# Patient Record
Sex: FEMALE | Race: WHITE | Employment: FULL TIME | URBAN - METROPOLITAN AREA
[De-identification: names, ages, dates, MRNs, and addresses within clinical notes are randomized per-mention and may not be internally consistent; named-entity substitution may affect disease eponyms.]

---

## 2017-02-03 NOTE — PERIOP NOTES
Patient has just been added to the surgery schedule for Monday, 2/6/2017 and does not have a PAT appointment. Per orders received from Dr. Myron Daniel office, labs are being done at [facility] and H&P is being done by Morris BUCIO. Left a VM for Priscila Beau at Dr. Myron Daniel office informing her that the patient still needs to come through PAT and that we have some openings this afternoon. Requested she return my call regarding this. DOS: 2/6/2017.

## 2017-02-03 NOTE — H&P
ORTHOPAEDIC SPINE SURGERY HISTORY & PHYSICAL    NAME:     Zonia Lamar   :       1985   MRN:       [id number]   DATE:      2/3/2017    HPI:   Zonia Lamar is a 33year old female who presented to our office on 16 with the complaint of debilitating right leg pain with associated weakness for the past 2 weeks. She is a very active person who does CrossFit. She noticed some pain the day after a workout. Her pain continually progressed. She is now unable to stand or sit. She has debilitating pain. She gets some relief with lying down. Hydrocodone is not holding her pain. She denies any LE numbness/tingling, urinary retention, bowel/bladder dysfunction or saddle paresthesias        PAST MEDICAL HISTORY: Patient denies    PSH: Lasik eye surgery    Family History: Cancer (mother)    Social History: Non-smoker, Drinks 2 alcoholic beverages per week     Allergies: NKDA     PHYSICAL EXAMINATION:     She is very pleasant. She is in distress. She has weakness for plantarflexion of the ankle that is 4-/5. Straight leg raise testing is positive on the right. She has diminished sensation to light touch for S1 on the right side. Motor, sensory, and reflex exams are otherwise nonfocal.  There is no clubbing, cyanosis, or edema. IMAGING STUDIES:   I reviewed Lumbar spine MRI which shows a large disc herniation on the right side at L5-S1 with inferior migration. There is severe lateral recess stenosis on the right side at L5-S1. She has disc degeneration at L3-4 and L4-5. ASSESSMENT:  Large lumbar disc herniation. PLAN:  Dr. Jana Liao discussed the pro's and con's of all the treatment options with the patient and her . They have elected to proceed with operative intervention. They understand the risks, benefits, and alternatives. The patient has provided informed consent. We will proceed with surgery after clearance.      [place]  Orthopaedic Surgery  Physician Assistant to Dr. Yady Grimes

## 2017-02-05 ENCOUNTER — ANESTHESIA EVENT (OUTPATIENT)
Dept: SURGERY | Age: 32
End: 2017-02-05
Payer: COMMERCIAL

## 2017-02-06 ENCOUNTER — SURGERY (OUTPATIENT)
Age: 32
End: 2017-02-06

## 2017-02-06 ENCOUNTER — ANESTHESIA (OUTPATIENT)
Dept: SURGERY | Age: 32
End: 2017-02-06
Payer: COMMERCIAL

## 2017-02-06 ENCOUNTER — APPOINTMENT (OUTPATIENT)
Dept: GENERAL RADIOLOGY | Age: 32
End: 2017-02-06
Attending: ORTHOPAEDIC SURGERY
Payer: COMMERCIAL

## 2017-02-06 ENCOUNTER — HOSPITAL ENCOUNTER (OUTPATIENT)
Age: 32
Setting detail: OUTPATIENT SURGERY
Discharge: HOME | End: 2017-02-06
Attending: ORTHOPAEDIC SURGERY | Admitting: ORTHOPAEDIC SURGERY
Payer: COMMERCIAL

## 2017-02-06 VITALS
SYSTOLIC BLOOD PRESSURE: 121 MMHG | BODY MASS INDEX: 19.34 KG/M2 | DIASTOLIC BLOOD PRESSURE: 85 MMHG | OXYGEN SATURATION: 99 % | TEMPERATURE: 98.1 F | WEIGHT: 109.13 LBS | HEIGHT: 63 IN | HEART RATE: 83 BPM | RESPIRATION RATE: 17 BRPM

## 2017-02-06 LAB
ABO + RH BLD: NORMAL
BLOOD GROUP ANTIBODIES SERPL: NORMAL
HCG UR QL: NEGATIVE
SPECIMEN EXP DATE BLD: NORMAL

## 2017-02-06 PROCEDURE — 81025 URINE PREGNANCY TEST: CPT

## 2017-02-06 PROCEDURE — 77030003161 HC GRFT DURA MTRX INLC -E: Performed by: ORTHOPAEDIC SURGERY

## 2017-02-06 PROCEDURE — 77030031139 HC SUT VCRL2 J&J -A: Performed by: ORTHOPAEDIC SURGERY

## 2017-02-06 PROCEDURE — 74011250636 HC RX REV CODE- 250/636: Performed by: ORTHOPAEDIC SURGERY

## 2017-02-06 PROCEDURE — 76210000016 HC OR PH I REC 1 TO 1.5 HR: Performed by: ORTHOPAEDIC SURGERY

## 2017-02-06 PROCEDURE — 77030032490 HC SLV COMPR SCD KNE COVD -B: Performed by: ORTHOPAEDIC SURGERY

## 2017-02-06 PROCEDURE — 77030026438 HC STYL ET INTUB CARD -A: Performed by: NURSE ANESTHETIST, CERTIFIED REGISTERED

## 2017-02-06 PROCEDURE — 74011250636 HC RX REV CODE- 250/636: Performed by: ANESTHESIOLOGY

## 2017-02-06 PROCEDURE — 77030003666 HC NDL SPINAL BD -A: Performed by: ORTHOPAEDIC SURGERY

## 2017-02-06 PROCEDURE — 77030018723 HC ELCTRD BLD COVD -A: Performed by: ORTHOPAEDIC SURGERY

## 2017-02-06 PROCEDURE — 86900 BLOOD TYPING SEROLOGIC ABO: CPT | Performed by: ORTHOPAEDIC SURGERY

## 2017-02-06 PROCEDURE — 76210000021 HC REC RM PH II 0.5 TO 1 HR: Performed by: ORTHOPAEDIC SURGERY

## 2017-02-06 PROCEDURE — 74011250636 HC RX REV CODE- 250/636

## 2017-02-06 PROCEDURE — 76000 FLUOROSCOPY <1 HR PHYS/QHP: CPT

## 2017-02-06 PROCEDURE — 74011000250 HC RX REV CODE- 250: Performed by: ORTHOPAEDIC SURGERY

## 2017-02-06 PROCEDURE — 77030008684 HC TU ET CUF COVD -B: Performed by: NURSE ANESTHETIST, CERTIFIED REGISTERED

## 2017-02-06 PROCEDURE — 77030019908 HC STETH ESOPH SIMS -A: Performed by: NURSE ANESTHETIST, CERTIFIED REGISTERED

## 2017-02-06 PROCEDURE — 74011000250 HC RX REV CODE- 250

## 2017-02-06 PROCEDURE — 76060000034 HC ANESTHESIA 1.5 TO 2 HR: Performed by: ORTHOPAEDIC SURGERY

## 2017-02-06 PROCEDURE — 36415 COLL VENOUS BLD VENIPUNCTURE: CPT | Performed by: ORTHOPAEDIC SURGERY

## 2017-02-06 PROCEDURE — 76010000162 HC OR TIME 1.5 TO 2 HR INTENSV-TIER 1: Performed by: ORTHOPAEDIC SURGERY

## 2017-02-06 PROCEDURE — 74011250637 HC RX REV CODE- 250/637: Performed by: ORTHOPAEDIC SURGERY

## 2017-02-06 PROCEDURE — 77030002933 HC SUT MCRYL J&J -A: Performed by: ORTHOPAEDIC SURGERY

## 2017-02-06 PROCEDURE — 77030037134 HC WRAP COMPR BACK THER SOLM -B

## 2017-02-06 PROCEDURE — 77030004391 HC BUR FLUT MEDT -C: Performed by: ORTHOPAEDIC SURGERY

## 2017-02-06 PROCEDURE — 77030020782 HC GWN BAIR PAWS FLX 3M -B

## 2017-02-06 PROCEDURE — 74011000272 HC RX REV CODE- 272: Performed by: ORTHOPAEDIC SURGERY

## 2017-02-06 DEVICE — DURAGEN® SUTURABLE DURAL REGENERATION MATRIX, 2 IN X 2 IN (5 CM X 5 CM)
Type: IMPLANTABLE DEVICE | Site: SPINE LUMBAR | Status: FUNCTIONAL
Brand: DURAGEN® SUTURABLE

## 2017-02-06 RX ORDER — SODIUM CHLORIDE 0.9 % (FLUSH) 0.9 %
5-10 SYRINGE (ML) INJECTION EVERY 8 HOURS
Status: DISCONTINUED | OUTPATIENT
Start: 2017-02-06 | End: 2017-02-06 | Stop reason: HOSPADM

## 2017-02-06 RX ORDER — SODIUM CHLORIDE 0.9 % (FLUSH) 0.9 %
5-10 SYRINGE (ML) INJECTION AS NEEDED
Status: DISCONTINUED | OUTPATIENT
Start: 2017-02-06 | End: 2017-02-06 | Stop reason: HOSPADM

## 2017-02-06 RX ORDER — OXYCODONE AND ACETAMINOPHEN 5; 325 MG/1; MG/1
2 TABLET ORAL
Status: COMPLETED | OUTPATIENT
Start: 2017-02-06 | End: 2017-02-06

## 2017-02-06 RX ORDER — OXYCODONE AND ACETAMINOPHEN 5; 325 MG/1; MG/1
1 TABLET ORAL
COMMUNITY
End: 2017-03-10

## 2017-02-06 RX ORDER — HYDROMORPHONE HYDROCHLORIDE 1 MG/ML
.25-.5 INJECTION, SOLUTION INTRAMUSCULAR; INTRAVENOUS; SUBCUTANEOUS
Status: DISCONTINUED | OUTPATIENT
Start: 2017-02-06 | End: 2017-02-06 | Stop reason: HOSPADM

## 2017-02-06 RX ORDER — PROPOFOL 10 MG/ML
INJECTION, EMULSION INTRAVENOUS AS NEEDED
Status: DISCONTINUED | OUTPATIENT
Start: 2017-02-06 | End: 2017-02-06 | Stop reason: HOSPADM

## 2017-02-06 RX ORDER — BUPIVACAINE HYDROCHLORIDE AND EPINEPHRINE 5; 5 MG/ML; UG/ML
INJECTION, SOLUTION EPIDURAL; INTRACAUDAL; PERINEURAL AS NEEDED
Status: DISCONTINUED | OUTPATIENT
Start: 2017-02-06 | End: 2017-02-06 | Stop reason: HOSPADM

## 2017-02-06 RX ORDER — CEFAZOLIN SODIUM IN 0.9 % NACL 2 G/50 ML
2 INTRAVENOUS SOLUTION, PIGGYBACK (ML) INTRAVENOUS ONCE
Status: COMPLETED | OUTPATIENT
Start: 2017-02-06 | End: 2017-02-06

## 2017-02-06 RX ORDER — MIDAZOLAM HYDROCHLORIDE 1 MG/ML
INJECTION, SOLUTION INTRAMUSCULAR; INTRAVENOUS AS NEEDED
Status: DISCONTINUED | OUTPATIENT
Start: 2017-02-06 | End: 2017-02-06 | Stop reason: HOSPADM

## 2017-02-06 RX ORDER — TRIAMCINOLONE ACETONIDE 40 MG/ML
INJECTION, SUSPENSION INTRA-ARTICULAR; INTRAMUSCULAR AS NEEDED
Status: DISCONTINUED | OUTPATIENT
Start: 2017-02-06 | End: 2017-02-06 | Stop reason: HOSPADM

## 2017-02-06 RX ORDER — DIAZEPAM 5 MG/1
5 TABLET ORAL
COMMUNITY
End: 2017-03-05

## 2017-02-06 RX ORDER — SODIUM CHLORIDE, SODIUM LACTATE, POTASSIUM CHLORIDE, CALCIUM CHLORIDE 600; 310; 30; 20 MG/100ML; MG/100ML; MG/100ML; MG/100ML
25 INJECTION, SOLUTION INTRAVENOUS CONTINUOUS
Status: DISCONTINUED | OUTPATIENT
Start: 2017-02-06 | End: 2017-02-06 | Stop reason: HOSPADM

## 2017-02-06 RX ORDER — BISMUTH SUBSALICYLATE 262 MG
1 TABLET,CHEWABLE ORAL DAILY
COMMUNITY

## 2017-02-06 RX ORDER — POVIDONE-IODINE 10 %
SOLUTION, NON-ORAL TOPICAL AS NEEDED
Status: DISCONTINUED | OUTPATIENT
Start: 2017-02-06 | End: 2017-02-06 | Stop reason: HOSPADM

## 2017-02-06 RX ORDER — ROCURONIUM BROMIDE 10 MG/ML
INJECTION, SOLUTION INTRAVENOUS AS NEEDED
Status: DISCONTINUED | OUTPATIENT
Start: 2017-02-06 | End: 2017-02-06 | Stop reason: HOSPADM

## 2017-02-06 RX ORDER — GLYCOPYRROLATE 0.2 MG/ML
INJECTION INTRAMUSCULAR; INTRAVENOUS AS NEEDED
Status: DISCONTINUED | OUTPATIENT
Start: 2017-02-06 | End: 2017-02-06 | Stop reason: HOSPADM

## 2017-02-06 RX ORDER — FENTANYL CITRATE 50 UG/ML
INJECTION, SOLUTION INTRAMUSCULAR; INTRAVENOUS AS NEEDED
Status: DISCONTINUED | OUTPATIENT
Start: 2017-02-06 | End: 2017-02-06 | Stop reason: HOSPADM

## 2017-02-06 RX ORDER — LIDOCAINE HYDROCHLORIDE 10 MG/ML
0.1 INJECTION, SOLUTION EPIDURAL; INFILTRATION; INTRACAUDAL; PERINEURAL AS NEEDED
Status: DISCONTINUED | OUTPATIENT
Start: 2017-02-06 | End: 2017-02-06 | Stop reason: HOSPADM

## 2017-02-06 RX ORDER — VANCOMYCIN HYDROCHLORIDE 1 G/20ML
INJECTION, POWDER, LYOPHILIZED, FOR SOLUTION INTRAVENOUS AS NEEDED
Status: DISCONTINUED | OUTPATIENT
Start: 2017-02-06 | End: 2017-02-06 | Stop reason: HOSPADM

## 2017-02-06 RX ORDER — DEXAMETHASONE SODIUM PHOSPHATE 4 MG/ML
INJECTION, SOLUTION INTRA-ARTICULAR; INTRALESIONAL; INTRAMUSCULAR; INTRAVENOUS; SOFT TISSUE AS NEEDED
Status: DISCONTINUED | OUTPATIENT
Start: 2017-02-06 | End: 2017-02-06 | Stop reason: HOSPADM

## 2017-02-06 RX ORDER — DIAZEPAM 10 MG/1
5 TABLET ORAL
Status: ON HOLD | COMMUNITY
End: 2017-02-06 | Stop reason: CLARIF

## 2017-02-06 RX ORDER — ONDANSETRON 2 MG/ML
INJECTION INTRAMUSCULAR; INTRAVENOUS AS NEEDED
Status: DISCONTINUED | OUTPATIENT
Start: 2017-02-06 | End: 2017-02-06 | Stop reason: HOSPADM

## 2017-02-06 RX ORDER — SODIUM CHLORIDE, SODIUM LACTATE, POTASSIUM CHLORIDE, CALCIUM CHLORIDE 600; 310; 30; 20 MG/100ML; MG/100ML; MG/100ML; MG/100ML
1000 INJECTION, SOLUTION INTRAVENOUS CONTINUOUS
Status: DISCONTINUED | OUTPATIENT
Start: 2017-02-06 | End: 2017-02-06 | Stop reason: HOSPADM

## 2017-02-06 RX ORDER — NEOSTIGMINE METHYLSULFATE 1 MG/ML
INJECTION INTRAVENOUS AS NEEDED
Status: DISCONTINUED | OUTPATIENT
Start: 2017-02-06 | End: 2017-02-06 | Stop reason: HOSPADM

## 2017-02-06 RX ORDER — LIDOCAINE HYDROCHLORIDE 20 MG/ML
INJECTION, SOLUTION EPIDURAL; INFILTRATION; INTRACAUDAL; PERINEURAL AS NEEDED
Status: DISCONTINUED | OUTPATIENT
Start: 2017-02-06 | End: 2017-02-06 | Stop reason: HOSPADM

## 2017-02-06 RX ADMIN — HYDROMORPHONE HYDROCHLORIDE 0.5 MG: 1 INJECTION, SOLUTION INTRAMUSCULAR; INTRAVENOUS; SUBCUTANEOUS at 09:55

## 2017-02-06 RX ADMIN — DEXAMETHASONE SODIUM PHOSPHATE 4 MG: 4 INJECTION, SOLUTION INTRA-ARTICULAR; INTRALESIONAL; INTRAMUSCULAR; INTRAVENOUS; SOFT TISSUE at 07:51

## 2017-02-06 RX ADMIN — HYDROMORPHONE HYDROCHLORIDE 0.5 MG: 1 INJECTION, SOLUTION INTRAMUSCULAR; INTRAVENOUS; SUBCUTANEOUS at 09:42

## 2017-02-06 RX ADMIN — Medication 18 ML: at 08:22

## 2017-02-06 RX ADMIN — HYDROMORPHONE HYDROCHLORIDE 0.5 MG: 1 INJECTION, SOLUTION INTRAMUSCULAR; INTRAVENOUS; SUBCUTANEOUS at 09:29

## 2017-02-06 RX ADMIN — ONDANSETRON 4 MG: 2 INJECTION INTRAMUSCULAR; INTRAVENOUS at 08:43

## 2017-02-06 RX ADMIN — MIDAZOLAM HYDROCHLORIDE 2 MG: 1 INJECTION, SOLUTION INTRAMUSCULAR; INTRAVENOUS at 07:34

## 2017-02-06 RX ADMIN — VANCOMYCIN HYDROCHLORIDE 1 G: 1 INJECTION, POWDER, LYOPHILIZED, FOR SOLUTION INTRAVENOUS at 08:40

## 2017-02-06 RX ADMIN — LIDOCAINE HYDROCHLORIDE 60 MG: 20 INJECTION, SOLUTION EPIDURAL; INFILTRATION; INTRACAUDAL; PERINEURAL at 07:38

## 2017-02-06 RX ADMIN — TRIAMCINOLONE ACETONIDE 40 MG: 40 INJECTION, SUSPENSION INTRA-ARTICULAR; INTRAMUSCULAR at 08:35

## 2017-02-06 RX ADMIN — BUPIVACAINE HYDROCHLORIDE AND EPINEPHRINE 20 ML: 5; 5 INJECTION, SOLUTION EPIDURAL; INTRACAUDAL; PERINEURAL at 08:30

## 2017-02-06 RX ADMIN — MIDAZOLAM HYDROCHLORIDE 1 MG: 1 INJECTION, SOLUTION INTRAMUSCULAR; INTRAVENOUS at 07:36

## 2017-02-06 RX ADMIN — SODIUM CHLORIDE, SODIUM LACTATE, POTASSIUM CHLORIDE, AND CALCIUM CHLORIDE 1000 ML: 600; 310; 30; 20 INJECTION, SOLUTION INTRAVENOUS at 07:04

## 2017-02-06 RX ADMIN — FENTANYL CITRATE 50 MCG: 50 INJECTION, SOLUTION INTRAMUSCULAR; INTRAVENOUS at 08:06

## 2017-02-06 RX ADMIN — THROMBIN, TOPICAL (BOVINE) 20000 UNITS: KIT at 08:21

## 2017-02-06 RX ADMIN — FENTANYL CITRATE 50 MCG: 50 INJECTION, SOLUTION INTRAMUSCULAR; INTRAVENOUS at 07:38

## 2017-02-06 RX ADMIN — ROCURONIUM BROMIDE 30 MG: 10 INJECTION, SOLUTION INTRAVENOUS at 07:38

## 2017-02-06 RX ADMIN — GLYCOPYRROLATE 0.2 MG: 0.2 INJECTION INTRAMUSCULAR; INTRAVENOUS at 08:45

## 2017-02-06 RX ADMIN — Medication 2 EACH: at 08:20

## 2017-02-06 RX ADMIN — OXYCODONE HYDROCHLORIDE AND ACETAMINOPHEN 2 TABLET: 5; 325 TABLET ORAL at 10:56

## 2017-02-06 RX ADMIN — SODIUM CHLORIDE, SODIUM LACTATE, POTASSIUM CHLORIDE, AND CALCIUM CHLORIDE: 600; 310; 30; 20 INJECTION, SOLUTION INTRAVENOUS at 08:16

## 2017-02-06 RX ADMIN — FENTANYL CITRATE 50 MCG: 50 INJECTION, SOLUTION INTRAMUSCULAR; INTRAVENOUS at 07:34

## 2017-02-06 RX ADMIN — PROPOFOL 150 MG: 10 INJECTION, EMULSION INTRAVENOUS at 07:38

## 2017-02-06 RX ADMIN — SODIUM CHLORIDE 1000 ML: 900 IRRIGANT IRRIGATION at 08:21

## 2017-02-06 RX ADMIN — CEFAZOLIN 2 G: 1 INJECTION, POWDER, FOR SOLUTION INTRAMUSCULAR; INTRAVENOUS; PARENTERAL at 07:49

## 2017-02-06 RX ADMIN — FENTANYL CITRATE 50 MCG: 50 INJECTION, SOLUTION INTRAMUSCULAR; INTRAVENOUS at 08:22

## 2017-02-06 RX ADMIN — NEOSTIGMINE METHYLSULFATE 1 MG: 1 INJECTION INTRAVENOUS at 08:45

## 2017-02-06 NOTE — OP NOTES
[address]  [address] [facility], [address] [facility]    OPERATIVE REPORT      NAME: Jenna Mcneill    AGE: 33 y.o. YOB: 1985    MEDICAL RECORD NUMBER: [id number]    DATE OF SURGERY: 2/6/2017    PREOPERATIVE DIAGNOSIS: Lumbar disk herniation    POSTOPERATIVE DIAGNOSIS: Lumbar disk herniation    OPERATIVE PROCEDURE: Right-sided L5-S1 hemilaminotomy, partial facetectomy, and excision of herniated disk tissue, with the use of the operating room microscope. SURGEON: Sandra Nava MD     ASSISTANT: RUPINDER Colmenares    ANESTHESIA: General    COMPLICATIONS: None     ESTIMATED BLOOD LOSS: 15    INDICATION FOR PROCEDURE: The patient is a very pleasant 33 y.o. female with debilitating right leg pain who failed conservative measures. She elected to proceed with operative intervention. She was aware of the risks, benefits, and alternatives. She provided informed consent. PROCEDURE: The patient was identified in the preoperative holding area. The lumbar spine was marked by me. She was transferred to the operating room where general anesthesia was given. She was also given perioperative antibiotics. She was placed prone on the Arthur frame. All bony prominences were well padded. The lumbar spine was prepped and draped in the usual standard fashion. We performed a surgical timeout. I made a skin incision over L5-S1. I exposed L5-S1 in standard fashion with electrocautery. I placed retractors and obtained intraoperative fluoroscopy to verify our level. I brought in the microscope. I then performed a right-sided hemilaminotomy with the high-speed bur of L5 and S1. I excised the ligamentum flavum to expose the neurologic elements. I performed a partial medial facetectomy on the right side. I gently retracted the nerve root medially to expose our disk herniation, which was excised in standard fashion.  The nerve root and thecal sac were decompressed and under no undue tension. I copiously irrigated the entire wound. We had good hemostasis. There was no CSF leaking under microscopic inspection. The soft tissues were injected with 0.5% Marcaine. We closed the wound in several layers. A sterile dressing was applied. The patient was extubated and transferred to the recovery room in good medical condition. I, Dr. Greg Martinez, performed the above procedures.      Greg Martinez MD  2/6/2017

## 2017-02-06 NOTE — IP AVS SNAPSHOT
[address] 
[phone] Patient: Jennifer Cain MRN: [id number] :1985 You are allergic to the following No active allergies Recent Documentation Height Weight BMI OB Status Smoking Status 1.6 m 49.5 kg 19.33 kg/m2 Having regular periods Never Smoker Unresulted Labs Order Current Status CULTURE, MRSA In process Emergency Contacts Name Discharge Info Relation Home Work Mobile Orlando Grubbs DISCHARGE CAREGIVER [3] Spouse [3] [phone] About your hospitalization You were admitted on:  2017 You last received care in the:  [facility] PACU You were discharged on:  2017 Unit phone number:  [phone] Why you were hospitalized Your primary diagnosis was:  Not on File Providers Seen During Your Hospitalizations Provider Role Specialty Primary office phone Lester Currie MD Attending Provider Orthopedic Surgery [phone] Your Primary Care Physician (PCP) Primary Care Physician Office Phone Office Fax NOT ON FILE ** None ** ** None ** Follow-up Information Follow up With Details Comments Contact Info Not On File Bshsi   Not On File (62) Patient has a PCP but that physician is not listed in [address] [facility]. Current Discharge Medication List  
  
CONTINUE these medications which have CHANGED Dose & Instructions Dispensing Information Comments Morning Noon Evening Bedtime VALIUM 5 mg tablet Generic drug:  diazePAM  
What changed:  Another medication with the same name was removed. Continue taking this medication, and follow the directions you see here. Your next dose is: Today, Tomorrow Other:  _________ Dose:  5 mg Take 5 mg by mouth every six (6) hours as needed for Anxiety. Refills:  0 CONTINUE these medications which have NOT CHANGED Dose & Instructions Dispensing Information Comments Morning Noon Evening Bedtime JUNEL FE 1/20 (28) PO Your next dose is: Today, Tomorrow Other:  _________ Take  by mouth. Refills:  0  
     
   
   
   
  
 multivitamin tablet Commonly known as:  ONE A DAY Your next dose is: Today, Tomorrow Other:  _________ Dose:  1 Tab Take 1 Tab by mouth daily. Refills:  0  
     
   
   
   
  
 oxyCODONE-acetaminophen 5-325 mg per tablet Commonly known as:  PERCOCET Your next dose is: Today, Tomorrow Other:  _________ Dose:  1 Tab Take 1 Tab by mouth every four (4) hours as needed for Pain. Refills:  0 Discharge Instructions Lumbar Surgery Discharge Instructions Activities 1. You are going home a well person, be as active as possible. Your only exercise should be walking. Start with short frequent walks and increase your walking distance each day. Limit the amount of time you sit to 20-30 minute intervals. Sitting for prolonged periods of time will be uncomfortable for you following your surgery. 2. Do not lift anything over five pounds. 3. Do not do any straining, twisting or bending. 4. When you are in the bed, you may lay on your back or on either side. Do not lay on your stomach. Diet ? You may resume your normal diet. If your throat is sore, you may want to eat soft foods for a few days. Be sure to drink plenty of fluids, it is important to keep yourself hydrated. ? Avoid alcoholic beverages and tobacco products. Medications 1. Take your pain medication as directed. 2. It is important to have regular bowel movements. Pain medications may cause constipation. Stool softeners, prune juice, and increasing your water and fiber intake may help in preventing constipation. 3. Laxatives should only be used if the above preventative measures have failed and you still have not had a bowel movement after three days. Driving You may not drive or return to work until instructed by your physician. However, you may ride in the car for short periods of time. Brace ? If you have a back brace, you should wear your brace at all times when you are up walking, sitting prolong periods and sleeping. Do not wear the brace while showering. ? Remember to always wear a cotton t-shirt underneath your brace. ? Do not bend or twist when your brace is off. Showering 1. You may shower the day after your surgery if your incision is not draining. 2. Leave the dressing on during your shower allowing the water to run over it. Once you get out of the shower, pat dressing dry. Oren Pickerel 3. Do not take a tub bath. Caring for your incision Leave the mesh on until it falls off on its own. Follow Up Once you are home, call your physicians office to schedule an appointment for your three-week postoperative visit. Notify your physician if you develop any of the following conditions: 1. Fever above 101 degrees for 24 hours. 2. Nausea or vomiting. 3. Severe headache. 4. Inability to urinate. 5. Loss of bowel or bladder function. 6. Changes in sensation in your extremities (numbness, tingling, loss of color). 7. Severe pain or pain not relieved by medications. 8. Redness, swelling, or drainage from your incision. 9. Persistent pain in the chest or calf of either leg. 10. Increased weakness (if this is greater than before your surgery). Webster [phone] (ext [phone] or [phone]) DISCHARGE SUMMARY from your Nurse The following personal items collected during your admission are returned to you:  
Dental Appliance: Dental Appliances: None Vision: Visual Aid: None Hearing Aid:   
Jewelry: Jewelry: Body Piercing (with ) Clothing: Clothing: Other (comment) (clothes to locker) Other Valuables: Other Valuables: None Valuables sent to safe:   
 
PATIENT INSTRUCTIONS: 
 
After general anesthesia or intravenous sedation, for 24 hours or while taking prescription Narcotics: · Limit your activities · Do not drive and operate hazardous machinery · Do not make important personal or business decisions · Do  not drink alcoholic beverages · If you have not urinated within 8 hours after discharge, please contact your surgeon on call. Report the following to your surgeon: 
· Excessive pain, swelling, redness or odor of or around the surgical area · Temperature over 100.5 · Nausea and vomiting lasting longer than 4 hours or if unable to take medications · Any signs of decreased circulation or nerve impairment to extremity: change in color, persistent  numbness, tingling, coldness or increase pain · Any questions [address] Breathing deep and coughing are very important exercises to do after surgery. Deep breathing and coughing open the little air tubes and air sacks in your lungs. You take deep breaths every day. You may not even notice - it is just something you do when you sigh or yawn. It is a natural exercise you do to keep these air passages open. After surgery, take deep breaths and cough, on purpose. Coughing and deep breathing help prevent bronchitis and pneumonia after surgery. If you had chest or belly surgery, use a pillow as a \"hug buddy\" and hold it tightly to your chest or belly when you cough. DIRECTIONS: 
6. Take 10 to 15 slow deep breaths every hour while awake. 7. Breathe in deeply, and hold it for 2 seconds. 8. Exhale slowly through puckered lips, like blowing up a balloon. 9. After every 4th or 5th deep breath, hug your pillow to your chest or belly and give a hard, deep cough. Yes, it will probably hurt.   But doing this exercise is very important part of healing after surgery. Take your pain medicine to help you do this exercise without too much pain. IF YOU HAVE BEEN DIAGNOSED WITH SLEEP APNEA, PLEASE USE YOUR SLEEIFP APNEA DEVICE OR CPAP MACHINE WHEN YOU INTEND TO NAP AFTER TAKING PAIN MEDICATION. Ankle Pumps Ankle pumps increase the circulation of oxygenated blood to your lower extremities and decrease your risk for circulation problems such as blood clots. They also stretch the muscles, tendons and ligaments in your foot and ankle, and prevent joint contracture in the ankle and foot, especially after surgeries on the legs. It is important to do ankle pump exercises regularly after surgery because immobility increases your risk for developing a blood clot. Your doctor may also have you take an Aspirin for the next few days as well. If your doctor did not ask you to take an Aspirin, consult with him before starting Aspirin therapy on your own. Slowly point your foot forward, feeling the muscles on the top of your lower leg stretch, and hold this position for 5 seconds. Next, pull your foot back toward you as far as possible, stretching the calf muscles, and hold that position for 5 seconds. Repeat with the other foot. Perform 10 repetitions every hour while awake for both ankles if possible (down and then up with the foot once is one repetition). You should feel gentle stretching of the muscles in your lower leg when doing this exercise. If you feel pain, or your range of motion is limited, don't  Push too hard. Only go the limit your joint and muscles will let you go. If you have increasing pain, progressively worsening leg warmth or swelling, STOP the exercise and call your doctor. Below is information about the medications your doctor is prescribing after your visit: 
 
Other information in your discharge envelope: PRESCRIPTIONS      PHYSICAL THERAPY PRESCRIPTION 
 APPOINTMENT CARDS [facility] Pamphlet for block or block with On-Q Catheter from Anesthesia Service Medical device information sheets/pamphlets from their  School/work excuse note. /parent work excuse note. These are general instructions for a healthy lifestyle: *  Please give a list of your current medications to your Primary Care Provider. *  Please update this list whenever your medications are discontinued, doses are 
    changed, or new medications (including over-the-counter products) are added. *  Please carry medication information at all times in case of emergency situations. About Smoking No smoking / No tobacco products / Avoid exposure to second hand smoke Surgeon General's Warning:  Quitting smoking now greatly reduces serious risk to your health. Obesity, smoking, and sedentary lifestyle greatly increases your risk for illness and disease. A healthy diet, regular physical exercise & weight monitoring are important for maintaining a healthy lifestyle. Congestive Heart Failure You may be retaining fluid if you have a history of heart failure or if you experience any of the following symptoms:  Weight gain of 3 pounds or more overnight or 5 pounds in a week, increased swelling in our hands or feet or shortness of breath while lying flat in bed. Please call your doctor as soon as you notice any of these symptoms; do not wait until your next office visit. Recognize signs and symptoms of STROKE: 
F - face looks uneven A - arms unable to move or move even S - speech slurred or non-existent T - time-call 911 as soon as signs and symptoms begin-DO NOT go Back to bed or wait to see if you get better-TIME IS BRAIN. Warning signs of HEART ATTACK Call 911 if you have these symptoms · Chest discomfort.   Most heart attacks involve discomfort in the center of the chest that lasts more than a few minutes, or that goes away and comes back. It can feel like uncomfortable pressure, squeezing, fullness, or pain. · Discomfort in other areas of the upper body. Symptoms can include pain or discomfort in one or both Arms, the back, neck, jaw, or stomach. ·  Shortness of breath with or without chest discomfort. · Other signs may include breaking out in a cold sweat, nausea, or lightheadedness Don't wait more than five minutes to call 911 - MINUTES MATTER! Fast action can save your life. Calling 911 is almost always the fastest way to get lifesaving treatment. Emergency Medical Services staff can begin treatment when they arrive - up to an hour sooner than if someone gets to the hospital by car. [facility] MEDICATION AND SIDE EFFECT GUIDE The [address] [facility] [place] EFFECT GUIDE was provided to the PATIENT AND CARE PROVIDER. Information provided includes instruction about drug purpose and common side effects for the following medications: · Discharge Orders None Introducing \[facility]\"" & [facility] SERVICES! Smiley Velásquez introduces [employer] patient portal. Now you can access parts of your medical record, email your doctor's office, and request medication refills online. 1. In your internet browser, go to [email or web address] 2. Click on the First Time User? Click Here link in the Sign In box. You will see the New Member Sign Up page. 3. Enter your [employer] Access Code exactly as it appears below. You will not need to use this code after youve completed the sign-up process. If you do not sign up before the expiration date, you must request a new code. · [employer] Access Code: [email or web address] Expires: 5/4/2017 11:12 AM 
 
4. Enter the last four digits of your Social Security Number (xxxx) and Date of Birth (mm/dd/yyyy) as indicated and click Submit. You will be taken to the next sign-up page. 5. Create a [employer] ID. This will be your [employer] login ID and cannot be changed, so think of one that is secure and easy to remember. 6. Create a [employer] password. You can change your password at any time. 7. Enter your Password Reset Question and Answer. This can be used at a later time if you forget your password. 8. Enter your e-mail address. You will receive e-mail notification when new information is available in [address] 9. Click Sign Up. You can now view and download portions of your medical record. 10. Click the Download Summary menu link to download a portable copy of your medical information. If you have questions, please visit the Frequently Asked Questions section of the [employer] website. Remember, [employer] is NOT to be used for urgent needs. For medical emergencies, dial 911. Now available from your iPhone and Android! General Information Please provide this summary of care documentation to your next provider. Patient Signature:  ____________________________________________________________ Date:  ____________________________________________________________  
  
[facility] Provider Signature:  ____________________________________________________________ Date:  ____________________________________________________________

## 2017-02-06 NOTE — IP AVS SNAPSHOT
Current Discharge Medication List  
  
Take these medications at their scheduled times Dose & Instructions Dispensing Information Comments Morning Noon Evening Bedtime  
 multivitamin tablet Commonly known as:  ONE A DAY Your next dose is: Today, Tomorrow Other:  ____________ Dose:  1 Tab Take 1 Tab by mouth daily. Refills:  0 Take these medications as needed Dose & Instructions Dispensing Information Comments Morning Noon Evening Bedtime  
 oxyCODONE-acetaminophen 5-325 mg per tablet Commonly known as:  PERCOCET Your next dose is: Today, Tomorrow Other:  ____________ Dose:  1 Tab Take 1 Tab by mouth every four (4) hours as needed for Pain. Refills:  0 VALIUM 5 mg tablet Generic drug:  diazePAM  
   
Your next dose is: Today, Tomorrow Other:  ____________ Dose:  5 mg Take 5 mg by mouth every six (6) hours as needed for Anxiety. Refills:  0 Take these medications as directed Dose & Instructions Dispensing Information Comments Morning Noon Evening Bedtime JUNEL FE 1/20 (28) PO Your next dose is: Today, Tomorrow Other:  ____________ Take  by mouth. Refills:  0

## 2017-02-06 NOTE — IP AVS SNAPSHOT
Summary of Care Report The Summary of Care report has been created to help improve care coordination. Users with access to [employer] or [address] (Web-based application) may access additional patient information including the Discharge Summary. If you are not currently a [address] user and need more information, please call the number listed below in the [place] [address] section and ask to be connected with Medical Records. Facility Information Name Address Phone [address] [facility] [place] [address] [phone] Patient Information Patient Name Sex DANNI Wilson ([id number]) Female 1985 Discharge Information Admitting Provider Service Area Unit Greg Martinez MD / [phone] [facility] / [phone] Discharge Provider Discharge Date/Time Discharge Disposition Destination (none) 2017 (Pending) AHR (none) Patient Language Language ENGLISH [13] You are allergic to the following No active allergies Current Discharge Medication List  
  
CONTINUE these medications which have CHANGED Dose & Instructions Dispensing Information Comments VALIUM 5 mg tablet Generic drug:  diazePAM  
What changed:  Another medication with the same name was removed. Continue taking this medication, and follow the directions you see here. Dose:  5 mg Take 5 mg by mouth every six (6) hours as needed for Anxiety. Refills:  0 CONTINUE these medications which have NOT CHANGED Dose & Instructions Dispensing Information Comments JUNEL FE 1/20 (28) PO Take  by mouth. Refills:  0  
   
 multivitamin tablet Commonly known as:  ONE A DAY Dose:  1 Tab Take 1 Tab by mouth daily. Refills:  0  
   
 oxyCODONE-acetaminophen 5-325 mg per tablet Commonly known as:  PERCOCET  
 Dose:  1 Tab Take 1 Tab by mouth every four (4) hours as needed for Pain. Refills:  0 Surgery Information ID Date/Time Status Primary Surgeon All Procedures Location [id number] 2/6/2017 0730 Unposted Lee Ann Gonsalez MD RIGHT L5-S1 MICRODISCECTOMY [facility] MAIN OR Follow-up Information Follow up With Details Comments Contact Info Not On File [facility]   Not On File (62) Patient has a PCP but that physician is not listed in [facility]. Discharge Instructions Lumbar Surgery Discharge Instructions Activities 1. You are going home a well person, be as active as possible. Your only exercise should be walking. Start with short frequent walks and increase your walking distance each day. Limit the amount of time you sit to 20-30 minute intervals. Sitting for prolonged periods of time will be uncomfortable for you following your surgery. 2. Do not lift anything over five pounds. 3. Do not do any straining, twisting or bending. 4. When you are in the bed, you may lay on your back or on either side. Do not lay on your stomach. Diet ? You may resume your normal diet. If your throat is sore, you may want to eat soft foods for a few days. Be sure to drink plenty of fluids, it is important to keep yourself hydrated. ? Avoid alcoholic beverages and tobacco products. Medications 1. Take your pain medication as directed. 2. It is important to have regular bowel movements. Pain medications may cause constipation. Stool softeners, prune juice, and increasing your water and fiber intake may help in preventing constipation. 3. Laxatives should only be used if the above preventative measures have failed and you still have not had a bowel movement after three days. Driving You may not drive or return to work until instructed by your physician. However, you may ride in the car for short periods of time. Brace ? If you have a back brace, you should wear your brace at all times when you are up walking, sitting prolong periods and sleeping. Do not wear the brace while showering. ? Remember to always wear a cotton t-shirt underneath your brace. ? Do not bend or twist when your brace is off. Showering 1. You may shower the day after your surgery if your incision is not draining. 2. Leave the dressing on during your shower allowing the water to run over it. Once you get out of the shower, pat dressing dry. Laxmi Gearing 3. Do not take a tub bath. Caring for your incision Leave the mesh on until it falls off on its own. Follow Up Once you are home, call your physicians office to schedule an appointment for your three-week postoperative visit. Notify your physician if you develop any of the following conditions: 1. Fever above 101 degrees for 24 hours. 2. Nausea or vomiting. 3. Severe headache. 4. Inability to urinate. 5. Loss of bowel or bladder function. 6. Changes in sensation in your extremities (numbness, tingling, loss of color). 7. Severe pain or pain not relieved by medications. 8. Redness, swelling, or drainage from your incision. 9. Persistent pain in the chest or calf of either leg. 10. Increased weakness (if this is greater than before your surgery). [place] Fukdhcyqlssz-[phone] (ext 0030 or 1886) DISCHARGE SUMMARY from your Nurse The following personal items collected during your admission are returned to you:  
Dental Appliance: Dental Appliances: None Vision: Visual Aid: None Hearing Aid:   
Jewelry: Jewelry: Body Piercing (with ) Clothing: Clothing: Other (comment) (clothes to locker) Other Valuables: Other Valuables: None Valuables sent to safe:   
 
PATIENT INSTRUCTIONS: 
 
After general anesthesia or intravenous sedation, for 24 hours or while taking prescription Narcotics: · Limit your activities · Do not drive and operate hazardous machinery · Do not make important personal or business decisions · Do  not drink alcoholic beverages · If you have not urinated within 8 hours after discharge, please contact your surgeon on call. Report the following to your surgeon: 
· Excessive pain, swelling, redness or odor of or around the surgical area · Temperature over 100.5 · Nausea and vomiting lasting longer than 4 hours or if unable to take medications · Any signs of decreased circulation or nerve impairment to extremity: change in color, persistent  numbness, tingling, coldness or increase pain · Any questions [address] Breathing deep and coughing are very important exercises to do after surgery. Deep breathing and coughing open the little air tubes and air sacks in your lungs. You take deep breaths every day. You may not even notice - it is just something you do when you sigh or yawn. It is a natural exercise you do to keep these air passages open. After surgery, take deep breaths and cough, on purpose. Coughing and deep breathing help prevent bronchitis and pneumonia after surgery. If you had chest or belly surgery, use a pillow as a \"hug buddy\" and hold it tightly to your chest or belly when you cough. DIRECTIONS: 
6. Take 10 to 15 slow deep breaths every hour while awake. 7. Breathe in deeply, and hold it for 2 seconds. 8. Exhale slowly through puckered lips, like blowing up a balloon. 9. After every 4th or 5th deep breath, hug your pillow to your chest or belly and give a hard, deep cough. Yes, it will probably hurt. But doing this exercise is very important part of healing after surgery. Take your pain medicine to help you do this exercise without too much pain. IF YOU HAVE BEEN DIAGNOSED WITH SLEEP APNEA, PLEASE USE YOUR SLEEIFP APNEA DEVICE OR CPAP MACHINE WHEN YOU INTEND TO NAP AFTER TAKING PAIN MEDICATION. Ankle Pumps Ankle pumps increase the circulation of oxygenated blood to your lower extremities and decrease your risk for circulation problems such as blood clots. They also stretch the muscles, tendons and ligaments in your foot and ankle, and prevent joint contracture in the ankle and foot, especially after surgeries on the legs. It is important to do ankle pump exercises regularly after surgery because immobility increases your risk for developing a blood clot. Your doctor may also have you take an Aspirin for the next few days as well. If your doctor did not ask you to take an Aspirin, consult with him before starting Aspirin therapy on your own. Slowly point your foot forward, feeling the muscles on the top of your lower leg stretch, and hold this position for 5 seconds. Next, pull your foot back toward you as far as possible, stretching the calf muscles, and hold that position for 5 seconds. Repeat with the other foot. Perform 10 repetitions every hour while awake for both ankles if possible (down and then up with the foot once is one repetition). You should feel gentle stretching of the muscles in your lower leg when doing this exercise. If you feel pain, or your range of motion is limited, don't  Push too hard. Only go the limit your joint and muscles will let you go. If you have increasing pain, progressively worsening leg warmth or swelling, STOP the exercise and call your doctor. Below is information about the medications your doctor is prescribing after your visit: 
 

## 2017-02-06 NOTE — H&P
Date of Surgery Update:  Courtney Freeman was seen and examined. History and physical has been reviewed. The patient has been examined.  There have been no significant clinical changes since the completion of the originally dated History and Physical.    Signed By: Sobia Dick MD     February 6, 2017 7:29 AM

## 2017-02-06 NOTE — DISCHARGE INSTRUCTIONS
Lumbar Surgery Discharge Instructions    Activities  1. You are going home a well person, be as active as possible. Your only exercise should be walking. Start with short frequent walks and increase your walking distance each day. Limit the amount of time you sit to 20-30 minute intervals. Sitting for prolonged periods of time will be uncomfortable for you following your surgery. 2. Do not lift anything over five pounds. 3. Do not do any straining, twisting or bending. 4. When you are in the bed, you may lay on your back or on either side. Do not lay on your stomach. Diet   You may resume your normal diet. If your throat is sore, you may want to eat soft foods for a few days. Be sure to drink plenty of fluids, it is important to keep yourself hydrated.  Avoid alcoholic beverages and tobacco products. Medications  1. Take your pain medication as directed. 2. It is important to have regular bowel movements. Pain medications may cause constipation. Stool softeners, prune juice, and increasing your water and fiber intake may help in preventing constipation. 3. Laxatives should only be used if the above preventative measures have failed and you still have not had a bowel movement after three days. Driving  You may not drive or return to work until instructed by your physician. However, you may ride in the car for short periods of time. Brace   If you have a back brace, you should wear your brace at all times when you are up walking, sitting prolong periods and sleeping. Do not wear the brace while showering.  Remember to always wear a cotton t-shirt underneath your brace.  Do not bend or twist when your brace is off. Showering  1. You may shower the day after your surgery if your incision is not draining. 2. Leave the dressing on during your shower allowing the water to run over it. Once you get out of the shower, pat dressing dry. .  3. Do not take a tub bath.    Caring for your incision Leave the mesh on until it falls off on its own. Follow Up  Once you are home, call your physicians office to schedule an appointment for your three-week postoperative visit. Notify your physician if you develop any of the following conditions:  1. Fever above 101 degrees for 24 hours. 2. Nausea or vomiting. 3. Severe headache. 4. Inability to urinate. 5. Loss of bowel or bladder function. 6. Changes in sensation in your extremities (numbness, tingling, loss of color). 7. Severe pain or pain not relieved by medications. 8. Redness, swelling, or drainage from your incision. 9. Persistent pain in the chest or calf of either leg. 10. Increased weakness (if this is greater than before your surgery). [place] [id number]-[phone] (ext 1242 or 5)        DISCHARGE SUMMARY from your Nurse    The following personal items collected during your admission are returned to you:   Dental Appliance: Dental Appliances: None  Vision: Visual Aid: None  Hearing Aid:    Jewelry: Jewelry: Body Piercing (with )  Clothing: Clothing: Other (comment) (clothes to locker)  Other Valuables: Other Valuables: None  Valuables sent to safe:      PATIENT INSTRUCTIONS:    After general anesthesia or intravenous sedation, for 24 hours or while taking prescription Narcotics:  · Limit your activities  · Do not drive and operate hazardous machinery  · Do not make important personal or business decisions  · Do  not drink alcoholic beverages  · If you have not urinated within 8 hours after discharge, please contact your surgeon on call.     Report the following to your surgeon:  · Excessive pain, swelling, redness or odor of or around the surgical area  · Temperature over 100.5  · Nausea and vomiting lasting longer than 4 hours or if unable to take medications  · Any signs of decreased circulation or nerve impairment to extremity: change in color, persistent  numbness, tingling, coldness or increase pain  · Any questions    COUGH AND DEEP BREATHE    Breathing deep and coughing are very important exercises to do after surgery. Deep breathing and coughing open the little air tubes and air sacks in your lungs. You take deep breaths every day. You may not even notice - it is just something you do when you sigh or yawn. It is a natural exercise you do to keep these air passages open. After surgery, take deep breaths and cough, on purpose. Coughing and deep breathing help prevent bronchitis and pneumonia after surgery. If you had chest or belly surgery, use a pillow as a \"hug santo\" and hold it tightly to your chest or belly when you cough. DIRECTIONS:  6. Take 10 to 15 slow deep breaths every hour while awake. 7. Breathe in deeply, and hold it for 2 seconds. 8. Exhale slowly through puckered lips, like blowing up a balloon. 9. After every 4th or 5th deep breath, hug your pillow to your chest or belly and give a hard, deep cough. Yes, it will probably hurt. But doing this exercise is very important part of healing after surgery. Take your pain medicine to help you do this exercise without too much pain. IF YOU HAVE BEEN DIAGNOSED WITH SLEEP APNEA, PLEASE USE YOUR SLEEIFP APNEA DEVICE OR CPAP MACHINE WHEN YOU INTEND TO NAP AFTER TAKING PAIN MEDICATION. Ankle Pumps    Ankle pumps increase the circulation of oxygenated blood to your lower extremities and decrease your risk for circulation problems such as blood clots. They also stretch the muscles, tendons and ligaments in your foot and ankle, and prevent joint contracture in the ankle and foot, especially after surgeries on the legs. It is important to do ankle pump exercises regularly after surgery because immobility increases your risk for developing a blood clot. Your doctor may also have you take an Aspirin for the next few days as well.     If your doctor did not ask you to take an Aspirin, consult with him before starting Aspirin therapy on your own. Slowly point your foot forward, feeling the muscles on the top of your lower leg stretch, and hold this position for 5 seconds. Next, pull your foot back toward you as far as possible, stretching the calf muscles, and hold that position for 5 seconds. Repeat with the other foot. Perform 10 repetitions every hour while awake for both ankles if possible (down and then up with the foot once is one repetition). You should feel gentle stretching of the muscles in your lower leg when doing this exercise. If you feel pain, or your range of motion is limited, don't  Push too hard. Only go the limit your joint and muscles will let you go. If you have increasing pain, progressively worsening leg warmth or swelling, STOP the exercise and call your doctor. Below is information about the medications your doctor is prescribing after your visit:    Other information in your discharge envelope:  []     PRESCRIPTIONS  []     PHYSICAL THERAPY PRESCRIPTION  []     APPOINTMENT CARDS  []     Regional Anesthesia Pamphlet for block or block with On-Q Catheter from Anesthesia Service  []     Medical device information sheets/pamphlets from their    []     School/work excuse note. []     /parent work excuse note. These are general instructions for a healthy lifestyle:    *  Please give a list of your current medications to your Primary Care Provider. *  Please update this list whenever your medications are discontinued, doses are      changed, or new medications (including over-the-counter products) are added. *  Please carry medication information at all times in case of emergency situations. About Smoking  No smoking / No tobacco products / Avoid exposure to second hand smoke    Surgeon General's Warning:  Quitting smoking now greatly reduces serious risk to your health.     Obesity, smoking, and sedentary lifestyle greatly increases your risk for illness and disease. A healthy diet, regular physical exercise & weight monitoring are important for maintaining a healthy lifestyle. Congestive Heart Failure  You may be retaining fluid if you have a history of heart failure or if you experience any of the following symptoms:  Weight gain of 3 pounds or more overnight or 5 pounds in a week, increased swelling in our hands or feet or shortness of breath while lying flat in bed. Please call your doctor as soon as you notice any of these symptoms; do not wait until your next office visit. Recognize signs and symptoms of STROKE:  F - face looks uneven  A - arms unable to move or move even  S - speech slurred or non-existent  T - time-call 911 as soon as signs and symptoms begin-DO NOT go         Back to bed or wait to see if you get better-TIME IS BRAIN. Warning signs of HEART ATTACK  Call 911 if you have these symptoms    · Chest discomfort. Most heart attacks involve discomfort in the center of the chest that lasts more than a few minutes, or that goes away and comes back. It can feel like uncomfortable pressure, squeezing, fullness, or pain. · Discomfort in other areas of the upper body. Symptoms can include pain or discomfort in one or both        Arms, the back, neck, jaw, or stomach. ·  Shortness of breath with or without chest discomfort. · Other signs may include breaking out in a cold sweat, nausea, or lightheadedness    Don't wait more than five minutes to call 911 - MINUTES MATTER! Fast action can save your life. Calling 911 is almost always the fastest way to get lifesaving treatment. Emergency Medical Services staff can begin treatment when they arrive - up to an hour sooner than if someone gets to the hospital by car. [facility] MEDICATION AND SIDE EFFECT GUIDE    The [employer] MEDICATION AND SIDE EFFECT GUIDE was provided to the PATIENT AND CARE PROVIDER.   Information provided includes instruction about drug purpose and common side effects for the following medications:    ·

## 2017-02-06 NOTE — ANESTHESIA PREPROCEDURE EVALUATION
Anesthetic History   No history of anesthetic complications            Review of Systems / Medical History  Patient summary reviewed and nursing notes reviewed    Pulmonary  Within defined limits                 Neuro/Psych             Comments: PAIN = 5/10   Right posterior to ankle Cardiovascular  Within defined limits                Exercise tolerance: >4 METS     GI/Hepatic/Renal  Within defined limits              Endo/Other  Within defined limits           Other Findings              Physical Exam    Airway  Mallampati: II    Neck ROM: normal range of motion   Mouth opening: Normal     Cardiovascular    Rhythm: regular  Rate: normal         Dental  No notable dental hx       Pulmonary  Breath sounds clear to auscultation               Abdominal         Other Findings            Anesthetic Plan    ASA: 1  Anesthesia type: general          Induction: Intravenous  Anesthetic plan and risks discussed with: Patient and Mother      Informed consent obtained.

## 2017-02-06 NOTE — ANESTHESIA POSTPROCEDURE EVALUATION
Post-Anesthesia Evaluation and Assessment    Patient: Song Mckeon MRN: [id number]  SSN: [id number]    YOB: 1985  Age: 33 y.o. Sex: female       Cardiovascular Function/Vital Signs  Visit Vitals    /81    Pulse 73    Temp 36.5 °C (97.7 °F)    Resp 15    Ht 5' 3\" (1.6 m)    Wt 49.5 kg (109 lb 2 oz)    SpO2 100%    BMI 19.33 kg/m2       Patient is status post general anesthesia for Procedure(s):  RIGHT L5-S1 MICRODISCECTOMY. Nausea/Vomiting: None    Postoperative hydration reviewed and adequate. Pain:  Pain Scale 1: Numeric (0 - 10) (02/06/17 0957)  Pain Intensity 1: 5 (02/06/17 0957)   Managed    Neurological Status:   Neuro (WDL): Exceptions to WDL (02/06/17 1030)  Neuro  Neurologic State: Sleeping (02/06/17 9615)  Orientation Level: Unable to verbalize (02/06/17 0134)  RLE Motor Response: Weak;Spastic (02/06/17 9883)   At baseline    Mental Status and Level of Consciousness: Arousable    Pulmonary Status:   O2 Device: Room air (02/06/17 0930)   Adequate oxygenation and airway patent    Complications related to anesthesia: None    Post-anesthesia assessment completed.  No concerns    Signed By: Devika Mendez DO     February 6, 2017

## 2017-02-06 NOTE — PERIOP NOTES
Pt. Fall protocol  Yellow armband on patient, yellow non skid socks on  Bed in low position, all side rails up, call bell in reach  Pt. And family instructed in \"call don't fall\" protocol  Use your call bell and wait for assistance, staff not family will assist you to get up and move about  Pt.  And family verbalize understanding of fall precautions and \"call don't fall\" protocol

## 2017-02-07 LAB
BACTERIA SPEC CULT: NORMAL
BACTERIA SPEC CULT: NORMAL
SERVICE CMNT-IMP: NORMAL

## 2017-02-08 ENCOUNTER — HOSPITAL ENCOUNTER (OUTPATIENT)
Dept: ULTRASOUND IMAGING | Age: 32
Discharge: HOME | End: 2017-02-08
Attending: ORTHOPAEDIC SURGERY
Payer: COMMERCIAL

## 2017-02-08 DIAGNOSIS — M79.669 CALF PAIN: ICD-10-CM

## 2017-02-08 PROCEDURE — 93971 EXTREMITY STUDY: CPT

## 2017-03-05 ENCOUNTER — APPOINTMENT (OUTPATIENT)
Dept: MRI IMAGING | Age: 32
End: 2017-03-05
Attending: EMERGENCY MEDICINE
Payer: COMMERCIAL

## 2017-03-05 ENCOUNTER — HOSPITAL ENCOUNTER (EMERGENCY)
Age: 32
Discharge: HOME | End: 2017-03-05
Attending: EMERGENCY MEDICINE | Admitting: EMERGENCY MEDICINE
Payer: COMMERCIAL

## 2017-03-05 ENCOUNTER — HOSPITAL ENCOUNTER (INPATIENT)
Age: 32
LOS: 3 days | Discharge: HOME HEALTH | DRG: 858 | End: 2017-03-10
Attending: EMERGENCY MEDICINE | Admitting: ORTHOPAEDIC SURGERY
Payer: COMMERCIAL

## 2017-03-05 ENCOUNTER — APPOINTMENT (OUTPATIENT)
Dept: GENERAL RADIOLOGY | Age: 32
End: 2017-03-05
Attending: EMERGENCY MEDICINE
Payer: COMMERCIAL

## 2017-03-05 VITALS
OXYGEN SATURATION: 98 % | RESPIRATION RATE: 16 BRPM | HEIGHT: 63 IN | BODY MASS INDEX: 19.31 KG/M2 | SYSTOLIC BLOOD PRESSURE: 99 MMHG | TEMPERATURE: 100 F | DIASTOLIC BLOOD PRESSURE: 61 MMHG | WEIGHT: 109 LBS | HEART RATE: 102 BPM

## 2017-03-05 DIAGNOSIS — M62.830 BACK MUSCLE SPASM: ICD-10-CM

## 2017-03-05 DIAGNOSIS — M54.50 ACUTE MIDLINE LOW BACK PAIN WITHOUT SCIATICA: Primary | ICD-10-CM

## 2017-03-05 DIAGNOSIS — M54.59 INTRACTABLE LOW BACK PAIN: Primary | ICD-10-CM

## 2017-03-05 LAB
ALBUMIN SERPL BCP-MCNC: 3.3 G/DL (ref 3.5–5)
ALBUMIN/GLOB SERPL: 0.9 {RATIO} (ref 1.1–2.2)
ALP SERPL-CCNC: 47 U/L (ref 45–117)
ALT SERPL-CCNC: 27 U/L (ref 12–78)
ANION GAP BLD CALC-SCNC: 9 MMOL/L (ref 5–15)
ANION GAP BLD CALC-SCNC: 9 MMOL/L (ref 5–15)
AST SERPL W P-5'-P-CCNC: 17 U/L (ref 15–37)
BASOPHILS # BLD AUTO: 0 K/UL (ref 0–0.1)
BASOPHILS # BLD AUTO: 0 K/UL (ref 0–0.1)
BASOPHILS # BLD: 0 % (ref 0–1)
BASOPHILS # BLD: 0 % (ref 0–1)
BILIRUB SERPL-MCNC: 0.7 MG/DL (ref 0.2–1)
BUN SERPL-MCNC: 11 MG/DL (ref 6–20)
BUN SERPL-MCNC: 8 MG/DL (ref 6–20)
BUN/CREAT SERPL: 10 (ref 12–20)
BUN/CREAT SERPL: 12 (ref 12–20)
CALCIUM SERPL-MCNC: 8.7 MG/DL (ref 8.5–10.1)
CALCIUM SERPL-MCNC: 8.7 MG/DL (ref 8.5–10.1)
CHLORIDE SERPL-SCNC: 100 MMOL/L (ref 97–108)
CHLORIDE SERPL-SCNC: 98 MMOL/L (ref 97–108)
CO2 SERPL-SCNC: 26 MMOL/L (ref 21–32)
CO2 SERPL-SCNC: 27 MMOL/L (ref 21–32)
CREAT SERPL-MCNC: 0.78 MG/DL (ref 0.55–1.02)
CREAT SERPL-MCNC: 0.89 MG/DL (ref 0.55–1.02)
DIFFERENTIAL METHOD BLD: ABNORMAL
EOSINOPHIL # BLD: 0 K/UL (ref 0–0.4)
EOSINOPHIL # BLD: 0 K/UL (ref 0–0.4)
EOSINOPHIL NFR BLD: 0 % (ref 0–7)
EOSINOPHIL NFR BLD: 0 % (ref 0–7)
ERYTHROCYTE [DISTWIDTH] IN BLOOD BY AUTOMATED COUNT: 12.8 % (ref 11.5–14.5)
ERYTHROCYTE [DISTWIDTH] IN BLOOD BY AUTOMATED COUNT: 12.8 % (ref 11.5–14.5)
GLOBULIN SER CALC-MCNC: 3.6 G/DL (ref 2–4)
GLUCOSE SERPL-MCNC: 140 MG/DL (ref 65–100)
GLUCOSE SERPL-MCNC: 99 MG/DL (ref 65–100)
HCT VFR BLD AUTO: 37.8 % (ref 35–47)
HCT VFR BLD AUTO: 38.3 % (ref 35–47)
HGB BLD-MCNC: 12.5 G/DL (ref 11.5–16)
HGB BLD-MCNC: 12.7 G/DL (ref 11.5–16)
LYMPHOCYTES # BLD AUTO: 3 % (ref 12–49)
LYMPHOCYTES # BLD AUTO: 4 % (ref 12–49)
LYMPHOCYTES # BLD: 0.3 K/UL (ref 0.8–3.5)
LYMPHOCYTES # BLD: 0.4 K/UL (ref 0.8–3.5)
MCH RBC QN AUTO: 31.5 PG (ref 26–34)
MCH RBC QN AUTO: 31.6 PG (ref 26–34)
MCHC RBC AUTO-ENTMCNC: 33.1 G/DL (ref 30–36.5)
MCHC RBC AUTO-ENTMCNC: 33.2 G/DL (ref 30–36.5)
MCV RBC AUTO: 95.2 FL (ref 80–99)
MCV RBC AUTO: 95.3 FL (ref 80–99)
MONOCYTES # BLD: 0.2 K/UL (ref 0–1)
MONOCYTES # BLD: 0.4 K/UL (ref 0–1)
MONOCYTES NFR BLD AUTO: 2 % (ref 5–13)
MONOCYTES NFR BLD AUTO: 4 % (ref 5–13)
NEUTS SEG # BLD: 9.4 K/UL (ref 1.8–8)
NEUTS SEG # BLD: 9.6 K/UL (ref 1.8–8)
NEUTS SEG NFR BLD AUTO: 92 % (ref 32–75)
NEUTS SEG NFR BLD AUTO: 95 % (ref 32–75)
PLATELET # BLD AUTO: 233 K/UL (ref 150–400)
PLATELET # BLD AUTO: 234 K/UL (ref 150–400)
POTASSIUM SERPL-SCNC: 3.9 MMOL/L (ref 3.5–5.1)
POTASSIUM SERPL-SCNC: 4.1 MMOL/L (ref 3.5–5.1)
PROT SERPL-MCNC: 6.9 G/DL (ref 6.4–8.2)
RBC # BLD AUTO: 3.97 M/UL (ref 3.8–5.2)
RBC # BLD AUTO: 4.02 M/UL (ref 3.8–5.2)
RBC MORPH BLD: ABNORMAL
RBC MORPH BLD: ABNORMAL
SODIUM SERPL-SCNC: 134 MMOL/L (ref 136–145)
SODIUM SERPL-SCNC: 135 MMOL/L (ref 136–145)
WBC # BLD AUTO: 10.1 K/UL (ref 3.6–11)
WBC # BLD AUTO: 10.2 K/UL (ref 3.6–11)

## 2017-03-05 PROCEDURE — 99284 EMERGENCY DEPT VISIT MOD MDM: CPT

## 2017-03-05 PROCEDURE — 96374 THER/PROPH/DIAG INJ IV PUSH: CPT

## 2017-03-05 PROCEDURE — 99218 HC RM OBSERVATION: CPT

## 2017-03-05 PROCEDURE — 85025 COMPLETE CBC W/AUTO DIFF WBC: CPT | Performed by: EMERGENCY MEDICINE

## 2017-03-05 PROCEDURE — 74011250636 HC RX REV CODE- 250/636: Performed by: PHYSICIAN ASSISTANT

## 2017-03-05 PROCEDURE — 96375 TX/PRO/DX INJ NEW DRUG ADDON: CPT

## 2017-03-05 PROCEDURE — 80048 BASIC METABOLIC PNL TOTAL CA: CPT | Performed by: EMERGENCY MEDICINE

## 2017-03-05 PROCEDURE — 74011250636 HC RX REV CODE- 250/636: Performed by: EMERGENCY MEDICINE

## 2017-03-05 PROCEDURE — 74011250637 HC RX REV CODE- 250/637: Performed by: PHYSICIAN ASSISTANT

## 2017-03-05 PROCEDURE — 72158 MRI LUMBAR SPINE W/O & W/DYE: CPT

## 2017-03-05 PROCEDURE — 77030020782 HC GWN BAIR PAWS FLX 3M -B

## 2017-03-05 PROCEDURE — 80053 COMPREHEN METABOLIC PANEL: CPT | Performed by: EMERGENCY MEDICINE

## 2017-03-05 PROCEDURE — 51798 US URINE CAPACITY MEASURE: CPT

## 2017-03-05 PROCEDURE — 72100 X-RAY EXAM L-S SPINE 2/3 VWS: CPT

## 2017-03-05 PROCEDURE — 96376 TX/PRO/DX INJ SAME DRUG ADON: CPT

## 2017-03-05 PROCEDURE — A9585 GADOBUTROL INJECTION: HCPCS | Performed by: EMERGENCY MEDICINE

## 2017-03-05 PROCEDURE — 36415 COLL VENOUS BLD VENIPUNCTURE: CPT | Performed by: EMERGENCY MEDICINE

## 2017-03-05 RX ORDER — HYDROMORPHONE HYDROCHLORIDE 2 MG/1
4 TABLET ORAL
Status: DISCONTINUED | OUTPATIENT
Start: 2017-03-05 | End: 2017-03-07

## 2017-03-05 RX ORDER — LORAZEPAM 2 MG/1
2 TABLET ORAL
Qty: 20 TAB | Refills: 0 | Status: SHIPPED | OUTPATIENT
Start: 2017-03-05 | End: 2017-03-05

## 2017-03-05 RX ORDER — HYDROMORPHONE HYDROCHLORIDE 2 MG/1
2 TABLET ORAL
Qty: 20 TAB | Refills: 0 | Status: SHIPPED | OUTPATIENT
Start: 2017-03-05 | End: 2017-03-10

## 2017-03-05 RX ORDER — DEXAMETHASONE SODIUM PHOSPHATE 4 MG/ML
6 INJECTION, SOLUTION INTRA-ARTICULAR; INTRALESIONAL; INTRAMUSCULAR; INTRAVENOUS; SOFT TISSUE EVERY 6 HOURS
Status: COMPLETED | OUTPATIENT
Start: 2017-03-05 | End: 2017-03-06

## 2017-03-05 RX ORDER — LORAZEPAM 2 MG/ML
1 INJECTION INTRAMUSCULAR
Status: COMPLETED | OUTPATIENT
Start: 2017-03-05 | End: 2017-03-05

## 2017-03-05 RX ORDER — ONDANSETRON 2 MG/ML
6 INJECTION INTRAMUSCULAR; INTRAVENOUS
Status: DISCONTINUED | OUTPATIENT
Start: 2017-03-05 | End: 2017-03-07

## 2017-03-05 RX ORDER — HYDROMORPHONE HYDROCHLORIDE 1 MG/ML
1 INJECTION, SOLUTION INTRAMUSCULAR; INTRAVENOUS; SUBCUTANEOUS ONCE
Status: COMPLETED | OUTPATIENT
Start: 2017-03-05 | End: 2017-03-05

## 2017-03-05 RX ORDER — SODIUM CHLORIDE 0.9 % (FLUSH) 0.9 %
5-10 SYRINGE (ML) INJECTION EVERY 8 HOURS
Status: DISCONTINUED | OUTPATIENT
Start: 2017-03-05 | End: 2017-03-07

## 2017-03-05 RX ORDER — SODIUM CHLORIDE 0.9 % (FLUSH) 0.9 %
5-10 SYRINGE (ML) INJECTION AS NEEDED
Status: DISCONTINUED | OUTPATIENT
Start: 2017-03-05 | End: 2017-03-07

## 2017-03-05 RX ORDER — HYDROMORPHONE HYDROCHLORIDE 2 MG/1
2 TABLET ORAL
Status: DISCONTINUED | OUTPATIENT
Start: 2017-03-05 | End: 2017-03-07

## 2017-03-05 RX ORDER — KETOROLAC TROMETHAMINE 30 MG/ML
15 INJECTION, SOLUTION INTRAMUSCULAR; INTRAVENOUS
Status: COMPLETED | OUTPATIENT
Start: 2017-03-05 | End: 2017-03-05

## 2017-03-05 RX ORDER — DIAZEPAM 5 MG/1
5 TABLET ORAL
Status: DISCONTINUED | OUTPATIENT
Start: 2017-03-05 | End: 2017-03-07

## 2017-03-05 RX ORDER — DIAZEPAM 10 MG/2ML
2.5 INJECTION INTRAMUSCULAR
Status: COMPLETED | OUTPATIENT
Start: 2017-03-05 | End: 2017-03-05

## 2017-03-05 RX ORDER — DIAZEPAM 5 MG/1
5 TABLET ORAL
Qty: 20 TAB | Refills: 0 | Status: SHIPPED | OUTPATIENT
Start: 2017-03-05 | End: 2017-03-10

## 2017-03-05 RX ORDER — HYDROMORPHONE HYDROCHLORIDE 1 MG/ML
1 INJECTION, SOLUTION INTRAMUSCULAR; INTRAVENOUS; SUBCUTANEOUS
Status: DISCONTINUED | OUTPATIENT
Start: 2017-03-05 | End: 2017-03-07

## 2017-03-05 RX ORDER — HYDROCODONE BITARTRATE AND ACETAMINOPHEN 7.5; 325 MG/1; MG/1
1 TABLET ORAL
COMMUNITY
End: 2017-03-10

## 2017-03-05 RX ORDER — HYDROMORPHONE HYDROCHLORIDE 2 MG/1
4 TABLET ORAL
Status: COMPLETED | OUTPATIENT
Start: 2017-03-05 | End: 2017-03-05

## 2017-03-05 RX ORDER — FENTANYL CITRATE 50 UG/ML
50 INJECTION, SOLUTION INTRAMUSCULAR; INTRAVENOUS
Status: COMPLETED | OUTPATIENT
Start: 2017-03-05 | End: 2017-03-05

## 2017-03-05 RX ORDER — DEXAMETHASONE SODIUM PHOSPHATE 4 MG/ML
10 INJECTION, SOLUTION INTRA-ARTICULAR; INTRALESIONAL; INTRAMUSCULAR; INTRAVENOUS; SOFT TISSUE ONCE
Status: COMPLETED | OUTPATIENT
Start: 2017-03-05 | End: 2017-03-05

## 2017-03-05 RX ADMIN — DIAZEPAM 5 MG: 5 TABLET ORAL at 22:08

## 2017-03-05 RX ADMIN — LORAZEPAM 1 MG: 2 INJECTION INTRAMUSCULAR; INTRAVENOUS at 09:13

## 2017-03-05 RX ADMIN — Medication 10 ML: at 22:56

## 2017-03-05 RX ADMIN — HYDROMORPHONE HYDROCHLORIDE 1 MG: 1 INJECTION, SOLUTION INTRAMUSCULAR; INTRAVENOUS; SUBCUTANEOUS at 20:39

## 2017-03-05 RX ADMIN — FENTANYL CITRATE 50 MCG: 50 INJECTION, SOLUTION INTRAMUSCULAR; INTRAVENOUS at 10:59

## 2017-03-05 RX ADMIN — KETOROLAC TROMETHAMINE 15 MG: 30 INJECTION, SOLUTION INTRAMUSCULAR at 09:57

## 2017-03-05 RX ADMIN — HYDROMORPHONE HYDROCHLORIDE 1 MG: 1 INJECTION, SOLUTION INTRAMUSCULAR; INTRAVENOUS; SUBCUTANEOUS at 22:08

## 2017-03-05 RX ADMIN — HYDROMORPHONE HYDROCHLORIDE 4 MG: 2 TABLET ORAL at 11:13

## 2017-03-05 RX ADMIN — DEXAMETHASONE SODIUM PHOSPHATE 10 MG: 4 INJECTION, SOLUTION INTRAMUSCULAR; INTRAVENOUS at 11:12

## 2017-03-05 RX ADMIN — DIAZEPAM 2.5 MG: 5 INJECTION, SOLUTION INTRAMUSCULAR; INTRAVENOUS at 11:07

## 2017-03-05 RX ADMIN — ONDANSETRON 6 MG: 2 INJECTION INTRAMUSCULAR; INTRAVENOUS at 22:04

## 2017-03-05 RX ADMIN — GADOBUTROL 5 ML: 604.72 INJECTION INTRAVENOUS at 12:16

## 2017-03-05 RX ADMIN — DEXAMETHASONE SODIUM PHOSPHATE 6 MG: 4 INJECTION, SOLUTION INTRAMUSCULAR; INTRAVENOUS at 22:06

## 2017-03-05 RX ADMIN — HYDROMORPHONE HYDROCHLORIDE 1 MG: 1 INJECTION, SOLUTION INTRAMUSCULAR; INTRAVENOUS; SUBCUTANEOUS at 22:55

## 2017-03-05 NOTE — LETTER
[address] 
[facility] EMERGENCY DEPT 
[facility] [id number] [phone] Work/School Note Date: 3/5/2017 To Whom It May concern: 
 
Rex Epps was seen and treated today in the emergency room by the following provider(s): 
Attending Provider: Ricardo Waggoner MD. Rex Epps may return to work on 3/8/17.  
 
Sincerely, 
 
 
 
 
Ricardo Waggoner MD

## 2017-03-05 NOTE — ED PROVIDER NOTES
HPI Comments: 33 y.o. female with past medical history significant for discectomy, elevated cholesterol, and Lasik eye surgery who presents from home via EMS with chief complaint of back pain. Pt reports that she had a L5-S1 discectomy performed on 2/6 by  for the disc pushing on her sciatic nerve causing leg pain. Pt reports lower medial back pain that started one day ago and worsened this morning. Pt describes the pain as spasms. Pt repots taking hydrocodone and Valium at 2200 last night, 0300, and 0700 this morning which have not provided relief. Pt received 200 mcg of IV Fentanyl which has provided relief. Pt reports the pain is mechanically exacerbated and initially pleuritically exacerbated. Pt reports that she had a follow up appointment 2 days ago with  and was told that he incision looked well and that she was recovering well. Pt had no complaints during her follow up. Xrays were also taken at her follow up. Pt reports taking steroids prior to her surgery. Pt denies a prior episode of back pain. Pt denies a mechanism of injury and overuse. Pt denies fever, abdominal pain, radiation of pain to her legs, and vomiting. There are no other acute medical concerns at this time. Social hx: nonsmoker, EtOH use  PCP: Pat Oviedo. Pb Santamaria MD  Orthopedic Surgeon: Harlan Woods MD    Note written by Demi Michelle, as dictated by Sukhdev Sargent MD 9:15 AM      The history is provided by the patient. No  was used. Past Medical History:   Diagnosis Date    Ill-defined condition     elevated cholesterol       Past Surgical History:   Procedure Laterality Date    HX OTHER SURGICAL      Lasik eye surgery         No family history on file. Social History     Social History    Marital status:      Spouse name: N/A    Number of children: N/A    Years of education: N/A     Occupational History    Not on file.      Social History Main Topics    Smoking status: Never Smoker    Smokeless tobacco: Not on file    Alcohol use 2.4 oz/week     2 Glasses of wine, 2 Cans of beer per week    Drug use: No    Sexual activity: Not on file     Other Topics Concern    Not on file     Social History Narrative    No narrative on file         ALLERGIES: Review of patient's allergies indicates no known allergies. Review of Systems   Constitutional: Negative for fever. Gastrointestinal: Negative for abdominal pain and vomiting. Musculoskeletal: Positive for back pain. All other systems reviewed and are negative. Vitals:    03/05/17 0859   BP: 116/72   Pulse: (!) 102   Resp: 16   Temp: 100 °F (37.8 °C)   SpO2: 100%   Weight: 49.4 kg (109 lb)   Height: 5' 3\" (1.6 m)            Physical Exam   Constitutional: She appears well-developed and well-nourished. No distress. HENT:   Head: Normocephalic. Eyes: Pupils are equal, round, and reactive to light. Neck: Normal range of motion. Cardiovascular: Normal rate and regular rhythm. No murmur heard. Pulmonary/Chest: Effort normal and breath sounds normal. No respiratory distress. Abdominal: Soft. There is no tenderness. Musculoskeletal: Normal range of motion. She exhibits no edema. Well healed incision on lower back;  nontender but worse with movement; Neurological: She is alert. She has normal strength. No cranial nerve deficit. Skin: Skin is warm and dry. Psychiatric: She has a normal mood and affect. Her behavior is normal.   Nursing note and vitals reviewed. Note written by Demi Orellana, as dictated by Lazarus Irving, MD 9:17 AM    [facility]  ED Course       Procedures    10:10 AM  Pain is still for the most part resolved. Discussed xray results with the pt and will discharge her home with stronger pain medications and a different muscle relaxer. However, when pt tried to stand, pain returned. CONSULT NOTE:  10:27 AM Lazarus Irving, MD spoke with RUPINDER Oliver , Consult for Orthopedics. Discussed available diagnostic tests and clinical findings. He is in agreement with care plans as outlined. 12:43 PM  Pain is much better. Will attempt dc. Pt is to see her ortho dr. Mary Jane Ferguson.

## 2017-03-05 NOTE — ED NOTES
Patient attempted to roll to her right side and move her right leg and had a painful spasm, is not able to get up.   Dr Joshua Burroughs advised

## 2017-03-05 NOTE — DISCHARGE INSTRUCTIONS
Back Pain: Care Instructions  Your Care Instructions    Back pain has many possible causes. It is often related to problems with muscles and ligaments of the back. It may also be related to problems with the nerves, discs, or bones of the back. Moving, lifting, standing, sitting, or sleeping in an awkward way can strain the back. Sometimes you don't notice the injury until later. Arthritis is another common cause of back pain. Although it may hurt a lot, back pain usually improves on its own within several weeks. Most people recover in 12 weeks or less. Using good home treatment and being careful not to stress your back can help you feel better sooner. Follow-up care is a key part of your treatment and safety. Be sure to make and go to all appointments, and call your doctor if you are having problems. Its also a good idea to know your test results and keep a list of the medicines you take. How can you care for yourself at home? · Sit or lie in positions that are most comfortable and reduce your pain. Try one of these positions when you lie down:  ¨ Lie on your back with your knees bent and supported by large pillows. ¨ Lie on the floor with your legs on the seat of a sofa or chair. Malia Running on your side with your knees and hips bent and a pillow between your legs. ¨ Lie on your stomach if it does not make pain worse. · Do not sit up in bed, and avoid soft couches and twisted positions. Bed rest can help relieve pain at first, but it delays healing. Avoid bed rest after the first day of back pain. · Change positions every 30 minutes. If you must sit for long periods of time, take breaks from sitting. Get up and walk around, or lie in a comfortable position. · Try using a heating pad on a low or medium setting for 15 to 20 minutes every 2 or 3 hours. Try a warm shower in place of one session with the heating pad. · You can also try an ice pack for 10 to 15 minutes every 2 to 3 hours.  Put a thin cloth between the ice pack and your skin. · Take pain medicines exactly as directed. ¨ If the doctor gave you a prescription medicine for pain, take it as prescribed. ¨ If you are not taking a prescription pain medicine, ask your doctor if you can take an over-the-counter medicine. · Take short walks several times a day. You can start with 5 to 10 minutes, 3 or 4 times a day, and work up to longer walks. Walk on level surfaces and avoid hills and stairs until your back is better. · Return to work and other activities as soon as you can. Continued rest without activity is usually not good for your back. · To prevent future back pain, do exercises to stretch and strengthen your back and stomach. Learn how to use good posture, safe lifting techniques, and proper body mechanics. When should you call for help? Call your doctor now or seek immediate medical care if:  · You have new or worsening numbness in your legs. · You have new or worsening weakness in your legs. (This could make it hard to stand up.)  · You lose control of your bladder or bowels. Watch closely for changes in your health, and be sure to contact your doctor if:  · Your pain gets worse. · You are not getting better after 2 weeks. Where can you learn more? Go to http://teresa-duong.info/. Enter U799 in the search box to learn more about \"Back Pain: Care Instructions. \"  Current as of: May 23, 2016  Content Version: 11.1  © 8179-7558 Healthwise, Incorporated. Care instructions adapted under license by [employer] (which disclaims liability or warranty for this information). If you have questions about a medical condition or this instruction, always ask your healthcare professional. [address] any warranty or liability for your use of this information.

## 2017-03-05 NOTE — IP AVS SNAPSHOT
Flores Woodward 
 
 
 [address] [facility] 
[phone] Patient: Polly Velasco MRN: [id number] :1985 You are allergic to the following No active allergies Recent Documentation Height Weight Breastfeeding? BMI OB Status Smoking Status 1.6 m 49.4 kg No 19.31 kg/m2 Having regular periods Never Smoker Unresulted Labs Order Current Status CULTURE, FUNGUS In process Emergency Contacts Name Discharge Info Relation Home Work Mobile Orlando Grubbs DISCHARGE CAREGIVER [3] Spouse [3] [phone] About your hospitalization You were admitted on:  2017 You last received care in the:  SF 4M POST SURG ORT 2 You were discharged on:  March 10, 2017 Unit phone number:  [phone] Why you were hospitalized Your primary diagnosis was:  Not on File Your diagnoses also included:  Lumbar Disc Herniation Providers Seen During Your Hospitalizations Provider Role Specialty Primary office phone Mega Crowley DO Attending Provider Emergency Medicine [phone] Maynor Flor MD Attending Provider Orthopedic Surgery [phone] Your Primary Care Physician (PCP) Primary Care Physician Office Phone Office Fax Madleyn Tsang [phone] Follow-up Information Follow up With Details Comments Contact Info Patrick Libman, PA  on 2017 at 2:10  [facility] [address] [facility] 
[phone] Werner Archer DO Schedule an appointment as soon as possible for a visit  [address] [facility] 
[phone] Current Discharge Medication List  
  
START taking these medications Dose & Instructions Dispensing Information Comments Morning Noon Evening Bedtime  
 cyclobenzaprine 10 mg tablet Commonly known as:  FLEXERIL  
   
 Your next dose is: Today, Tomorrow Other:  _________ Dose:  10 mg Take 1 Tab by mouth three (3) times daily as needed for Muscle Spasm(s). Quantity:  60 Tab Refills:  2  
     
   
   
   
  
 docusate sodium 100 mg capsule Commonly known as:  Monica Bowieh Your next dose is: Today, Tomorrow Other:  _________ Dose:  100 mg Take 1 Cap by mouth two (2) times a day. Quantity:  60 Cap Refills:  2  
     
   
   
   
  
 promethazine 25 mg tablet Commonly known as:  PHENERGAN Your next dose is: Today, Tomorrow Other:  _________ Dose:  25 mg Take 1 Tab by mouth every six (6) hours as needed for Nausea. Quantity:  60 Tab Refills:  2 CONTINUE these medications which have CHANGED Dose & Instructions Dispensing Information Comments Morning Noon Evening Bedtime  
 oxyCODONE-acetaminophen 5-325 mg per tablet Commonly known as:  PERCOCET What changed:   
- how much to take 
- how to take this - when to take this 
- reasons to take this 
- additional instructions Your next dose is: Today, Tomorrow Other:  _________ Take 1-2 tablets by mouth every 4 to 6 hours as needed for pain Quantity:  90 Tab Refills:  0 CONTINUE these medications which have NOT CHANGED Dose & Instructions Dispensing Information Comments Morning Noon Evening Bedtime JUNEL FE 1/20 (28) PO Your next dose is: Today, Tomorrow Other:  _________ Dose:  1 Tab Take 1 Tab by mouth daily. Refills:  0  
     
   
   
   
  
 multivitamin tablet Commonly known as:  ONE A DAY Your next dose is: Today, Tomorrow Other:  _________ Dose:  1 Tab Take 1 Tab by mouth daily. Refills:  0 STOP taking these medications   
 diazePAM 5 mg tablet Commonly known as:  VALIUM  
   
  
 HYDROcodone-acetaminophen 7.5-325 mg per tablet Commonly known as:  Lance Dolphin HYDROmorphone 2 mg tablet Commonly known as:  DILAUDID Where to Get Your Medications Information on where to get these meds will be given to you by the nurse or doctor. ! Ask your nurse or doctor about these medications  
  cyclobenzaprine 10 mg tablet  
 docusate sodium 100 mg capsule  
 oxyCODONE-acetaminophen 5-325 mg per tablet  
 promethazine 25 mg tablet Discharge Instructions Lumbar Spinal Surgery Discharge Instructions Dr. Lee Ann Gonsalez [address] [place] [address] [phone] Activity:   
? Your only exercise should be walking. Start with short walks and increase your walking distance each day. Start with walking twice a day for 5 minutes and increase your distance each day 2-3 minutes until you reach 25 minutes twice a day. Limit the amount of time you sit to 20-30 minute intervals. Sitting for prolonged periods of time will be uncomfortable for you following your surgery. ? No bending, lifting (of 5lbs or more), twisting, or straining. ? Do not drive until your doctor states you may do so. However, you may ride in a car for short distances. ? If you are required to wear a brace, you should wear it at all times when you are out of bed. You may remove it when sleeping unless your physician advises you against it. ? When you are in the bed, you may lay on your back or on either side. Do not lie on your stomach. ? You may resume sexual relations 4-6 weeks after surgery. ? Continue to use your incentive spirometer for deep breathing exercises. Driving: ? You may not drive or return to work until instructed by your physician. However, you may ride in the car for short periods of time. Brace: ? If you have a back brace, you should wear your brace at all times when you are out of bed. Do not wear the brace while in bed or showering. ? Remember to always wear a cotton t-shirt underneath your brace. ? Do not bend or twist when your brace is off. Diet: 
? You may resume your regular home diet as tolerated. If your throat is sore, you should eat soft foods for the first couple of days. ? Be sure to drink plenty of fluids; it is important to keep yourself hydrated. ? Avoid alcoholic beverages and ABSOLUTELY NO tobacco products. Tobacco products will interfere with your healing. If you continue to use tobacco, you may end up needing another surgery in the future. Dressing: You have on a Prineo dressing. This is a waterproof bacteriostatic dressing that requires no post-operative care. Please do not peel the dressing off, or apply any oil based products, as they may expedite the deterioration of the mesh. The dressing will slowly chip off on its own. 
? Do not rub or apply lotion or ointments to incision site. Shower: ? You may shower 5 days after your surgery. ? You may remove your brace during showers. ? NO not use tub baths, swimming pools or Jacuzzis. Medications: 
? Check with your physician before taking any anti-inflammatory medications. (Advil, Aleve, Ibuprofen, Aspirin) ? Take your pain medication as directed ? Do NOT take additional Tylenol if your prescribed pain medication has acetaminophen in it (Endocet/Percocet, Lortab, Norco) ? It is important to have regular bowel movements. Pain medications can be constipating. Stool softeners, warm prune juice, increasing your water intake, and increasing fiber in your diet can help in preventing constipation. ? Do NOT take laxatives if at all possible except in severe situations. It can result in a vicious cycle of constipation and diarrhea. Stockings: ? You have been given T.E.D. stockings to wear. Continue to wear these for 7 days after your discharge. Put them on in the morning and take them off at night.   They are used to increase your circulation and prevent blood clots from forming in your legs. Follow-Up ? Please call ASAP to schedule your 1st post-operative appointment. This should be approximately 3 weeks from your surgery date. Notify your physician in you develop any of the following conditions: 
? Fever above 101 degrees for 24 hours. ? Nausea or vomiting. ? Severe headache. 
? Inability to urinate ? Loss of bowel or bladder function (sudden onset of incontinence) ? Changes in sensation in your extremities (numbness, tingling, loss of color). ? Severe pain or pain not relieved by medications. ? Redness, swelling, or drainage from your incision. ? Persistent pain in the chest.  
? Pain in the calf of either leg. 
? Increased weakness (if this is greater than before your surgery). If you have any questions about your dressing contact your Orthopaedic Surgeons office. ** IMPORTANT: YOU HAVE A PRINEO DRESSING OVER YOUR INCISION. THIS IS AN ADHESIVE MESH THAT WAS STERILELY GLUED TO YOUR SKIN. DO NOT REMOVE THIS. NO ONE ELSE SHOULD REMOVE IT EITHER. IT WILL COME OFF ON ITS OWN OVER TIME 
 
* WEAR YOUR BRACE AS ADVISED * NO DRIVING UNTIL YOU ARE CLEARED TO DO SO BY YOUR SURGEON 
 
* LIMIT LIFTING, BENDING AND TWISTING. NO LIFTING MORE THAN 5 LBS * MAKE SURE YOU ARE GETTING GOOD NUTRITION 
 
* FULLY READ YOUR DISCHARGE INSTRUCTIONS Discharge Orders None [facility] Announcement We are excited to announce that we are making your provider's discharge notes available to you in [employer]. You will see these notes when they are completed and signed by the physician that discharged you from your recent hospital stay. If you have any questions or concerns about any information you see in [employer], please call the Health Information Department where you were seen or reach out to your Primary Care Provider for more information about your plan of care. Introducing [facility] SERVICES! Avril York introduces [employer] patient portal. Now you can access parts of your medical record, email your doctor's office, and request medication refills online. 1. In your internet browser, go to [email or web address] 2. Click on the First Time User? Click Here link in the Sign In box. You will see the New Member Sign Up page. 3. Enter your [employer] Access Code exactly as it appears below. You will not need to use this code after youve completed the sign-up process. If you do not sign up before the expiration date, you must request a new code. · [employer] Access Code: [email or web address] Expires: 5/4/2017 11:12 AM 
 
4. Enter the last four digits of your Social Security Number (xxxx) and Date of Birth (mm/dd/yyyy) as indicated and click Submit. You will be taken to the next sign-up page. 5. Create a [employer] ID. This will be your [employer] login ID and cannot be changed, so think of one that is secure and easy to remember. 6. Create a [employer] password. You can change your password at any time. 7. Enter your Password Reset Question and Answer. This can be used at a later time if you forget your password. 8. Enter your e-mail address. You will receive e-mail notification when new information is available in [address] 9. Click Sign Up. You can now view and download portions of your medical record. 10. Click the Download Summary menu link to download a portable copy of your medical information. If you have questions, please visit the Frequently Asked Questions section of the [employer] website. Remember, [employer] is NOT to be used for urgent needs. For medical emergencies, dial 911. Now available from your iPhone and Android! General Information Please provide this summary of care documentation to your next provider. Patient Signature:  ____________________________________________________________ Date:  ____________________________________________________________  
  
Tito Artist Provider Signature:  ____________________________________________________________ Date:  ____________________________________________________________

## 2017-03-05 NOTE — CONSULTS
ORTHO  CONSULT    Subjective:     Date of Consultation:  March 5, 2017    Referring Physician:  Dr. Joaquina Burgos is a 33 y.o. female we are consulted to see for intractable back pain with muscle spasms for 24hrs. Pt. States was standing at a microbrewery yesterday, soreness in lower back last evening with muscle spasms. Pt. Took one oxycodone last night with 5mg Valium. This helped but worsening spasms this morning. Pt. Unable to ambulate, had ambulance take her to the ER. She received Fentanyl en route and toradol here in ER. Pt. Had microdiscectomy with Dr. Serene Reynoso on 2/6/17. Follow up 2 days ago without incident with his PA, Celena Rodriguez Denies fever at home. There are no active problems to display for this patient. No family history on file. Social History   Substance Use Topics    Smoking status: Never Smoker    Smokeless tobacco: Not on file    Alcohol use 2.4 oz/week     2 Glasses of wine, 2 Cans of beer per week     Past Medical History:   Diagnosis Date    Ill-defined condition     elevated cholesterol      Past Surgical History:   Procedure Laterality Date    HX OTHER SURGICAL      Lasik eye surgery      Prior to Admission medications    Medication Sig Start Date End Date Taking? Authorizing Provider   HYDROmorphone (DILAUDID) 2 mg tablet Take 1 Tab by mouth every four (4) hours as needed for Pain. Max Daily Amount: 12 mg. 3/5/17  Yes Blaine Rouse MD   LORazepam (ATIVAN) 2 mg tablet Take 1 Tab by mouth every eight (8) hours as needed for up to 20 doses. Max Daily Amount: 6 mg. 3/5/17  Yes Blaine Rouse MD   multivitamin (ONE A DAY) tablet Take 1 Tab by mouth daily. Historical Provider   NORETHINDRONE-E.ESTRADIOL-IRON (JUNEL FE 1/20, 28, PO) Take  by mouth. Historical Provider   diazePAM (VALIUM) 5 mg tablet Take 5 mg by mouth every six (6) hours as needed for Anxiety.     Historical Provider   oxyCODONE-acetaminophen (PERCOCET) 5-325 mg per tablet Take 1 Tab by mouth every four (4) hours as needed for Pain. Historical Provider     No current facility-administered medications for this encounter. Current Outpatient Prescriptions   Medication Sig    HYDROmorphone (DILAUDID) 2 mg tablet Take 1 Tab by mouth every four (4) hours as needed for Pain. Max Daily Amount: 12 mg.  LORazepam (ATIVAN) 2 mg tablet Take 1 Tab by mouth every eight (8) hours as needed for up to 20 doses. Max Daily Amount: 6 mg.  multivitamin (ONE A DAY) tablet Take 1 Tab by mouth daily.  NORETHINDRONE-E.ESTRADIOL-IRON (JUNEL FE , , PO) Take  by mouth.  diazePAM (VALIUM) 5 mg tablet Take 5 mg by mouth every six (6) hours as needed for Anxiety.  oxyCODONE-acetaminophen (PERCOCET) 5-325 mg per tablet Take 1 Tab by mouth every four (4) hours as needed for Pain. No Known Allergies     Review of Systems:  A comprehensive review of systems was negative except for that written in the HPI. Objective:     Patient Vitals for the past 8 hrs:   BP Temp Pulse Resp SpO2 Height Weight   17 1000 116/79 - - - 98 % - -   17 0915 - - - - 99 % - -   17 0900 116/72 - - - - - -   17 0859 116/72 100 °F (37.8 °C) (!) 102 16 100 % 5' 3\" (1.6 m) 49.4 kg (109 lb)     Temp (24hrs), Av °F (37.8 °C), Min:100 °F (37.8 °C), Max:100 °F (37.8 °C)        EXAM: I examined pt's L spine. No Spinous process pain. No paraspinous process pain on exam. Strength 5/5 BLE's, DTR 2+ BLE's, +Dorsi/plantar flexion BLE's. NVI distally BLE's. Cap. Refill <2secs all toes. Motor/Sensory intact BLE's. When torsioning in bed, palpable muscle spasms surrounding the lumbar region bilaterally. Data Review No results found for this or any previous visit (from the past 24 hour(s)).   MRI w and wo pending L spine  Labs pending        Assessment/Plan:     Musculo/Skeletal LBP      Decadron IV, Dilaudid 4mg PO, Valium 2.5mg IV, Ice to Lower back, post residual bladder scan        UPDATE:  MRI L spine:  EXAM: MRI LUMB SPINE W WO CONT        HISTORY: Acute onset of low back pain, the patient had a discectomy performed  L5-S1 February 6 with sciatica.        INDICATION: bp.     COMPARISON: None     TECHNIQUE: MR imaging of the lumbar spine was performed using the following  sequences: sagittal T1, T2, STIR; axial T1, T2 prior to and following contrast  administration.      CONTRAST: 5 mL of Gadavist.     FINDINGS:     There is normal alignment of the lumbar spine. Vertebral body heights are  maintained. Marrow signal is normal.     The conus medullaris terminates at superior aspect of L1. Signal and caliber of  the distal spinal cord are within normal limits. There is no pathologic  intrathecal enhancement.     The paraspinal soft tissues are within normal limits.     Lower thoracic spine: No herniation or stenosis.     L1-L2: No herniation or stenosis.     L2-L3: No herniation or stenosis.     L3-L4: Disc desiccation and disc bulge. There is an annular ligament tear along  the inferior margin of the disc. The canal and foramina are widely patent.     L4-L5: Disc bulge. Minimal annular ligament tear centrally. Canal and foramina  are widely patent     L5-S1: Hemilaminotomy defect on the right. Typical postprocedural findings on  the right. There is a mild broad-based disc protrusion which extends the right  paracentral region. At the right lateral recess there is a caudally oriented  extrusion that measures 14.7 mm x 5.5 mm in AP dimension. There is right lateral  recess stenosis which is mild. There is mild effacement of nerve roots extending  to the right S1 foramen. The canal is patent.      IMPRESSION  IMPRESSION:  Hemilaminotomy defect on the right at L5-S1. No central canal stenosis is  present. Mild broad-based disc protrusion extends to the right paracentral and  right lateral recess region of L5-S1. Small disc extrusion at the right lateral  recess of L5-S1. The right neural foramen is patent.  There is moderate  effacement of nerve roots extending to the right S1 foramen. No evidence of discitis, no wound infection. MRI from 2 months ago done at imaging center, so unable to compare findings of annular tears could be old. No stenosis, central canal and foramen are patent. Pt. Post void residual is 0ml per ER RN, Jeffrey Oliver. Pt. Up to bathroom, tolerating some ambulation. Recommendation, Home with Dilaudid PO and Valium PO, Ice to lower back. Limit ambulation. I have called Dr. Eleuterio Chamberlain PA, [facility] and they will call pt. Tomorrow to schedule follow up and will compare MRI with previous one prior to surgery. Dr. John Quiñones agrees with plan. Thank you for allowing us to take part in this patients care. Angelito Beck PA-C  Orthopaedic Surgery [address]  [address]  [phone] Castro PA-C

## 2017-03-05 NOTE — ED TRIAGE NOTES
Patient had a back surgery 4 weeks ago, and was doing great, and today has back pain, it began yesterday, this am it was horrible, took her medications she had from surgery, at 7am it was unbearable.

## 2017-03-06 ENCOUNTER — ANESTHESIA EVENT (OUTPATIENT)
Dept: SURGERY | Age: 32
DRG: 858 | End: 2017-03-06
Payer: COMMERCIAL

## 2017-03-06 LAB
APPEARANCE UR: CLEAR
BACTERIA URNS QL MICRO: NEGATIVE /HPF
BILIRUB UR QL: NEGATIVE
COLOR UR: ABNORMAL
CRP SERPL-MCNC: 19.01 MG/DL
EPITH CASTS URNS QL MICRO: ABNORMAL /LPF
ERYTHROCYTE [SEDIMENTATION RATE] IN BLOOD: 41 MM/HR (ref 0–20)
GLUCOSE UR STRIP.AUTO-MCNC: NEGATIVE MG/DL
HGB UR QL STRIP: ABNORMAL
HYALINE CASTS URNS QL MICRO: ABNORMAL /LPF (ref 0–5)
KETONES UR QL STRIP.AUTO: NEGATIVE MG/DL
LEUKOCYTE ESTERASE UR QL STRIP.AUTO: NEGATIVE
NITRITE UR QL STRIP.AUTO: NEGATIVE
PH UR STRIP: 6 [PH] (ref 5–8)
PROT UR STRIP-MCNC: NEGATIVE MG/DL
RBC #/AREA URNS HPF: ABNORMAL /HPF (ref 0–5)
SP GR UR REFRACTOMETRY: 1.01 (ref 1–1.03)
UROBILINOGEN UR QL STRIP.AUTO: 0.2 EU/DL (ref 0.2–1)
WBC URNS QL MICRO: ABNORMAL /HPF (ref 0–4)

## 2017-03-06 PROCEDURE — 85652 RBC SED RATE AUTOMATED: CPT | Performed by: PHYSICIAN ASSISTANT

## 2017-03-06 PROCEDURE — 74011250637 HC RX REV CODE- 250/637: Performed by: PHYSICIAN ASSISTANT

## 2017-03-06 PROCEDURE — 97116 GAIT TRAINING THERAPY: CPT

## 2017-03-06 PROCEDURE — 51798 US URINE CAPACITY MEASURE: CPT

## 2017-03-06 PROCEDURE — 74011250636 HC RX REV CODE- 250/636: Performed by: PHYSICIAN ASSISTANT

## 2017-03-06 PROCEDURE — 77030011256 HC DRSG MEPILEX <16IN NO BORD MOLN -A

## 2017-03-06 PROCEDURE — 87075 CULTR BACTERIA EXCEPT BLOOD: CPT | Performed by: PHYSICIAN ASSISTANT

## 2017-03-06 PROCEDURE — 87205 SMEAR GRAM STAIN: CPT | Performed by: PHYSICIAN ASSISTANT

## 2017-03-06 PROCEDURE — 36415 COLL VENOUS BLD VENIPUNCTURE: CPT | Performed by: PHYSICIAN ASSISTANT

## 2017-03-06 PROCEDURE — 81001 URINALYSIS AUTO W/SCOPE: CPT | Performed by: EMERGENCY MEDICINE

## 2017-03-06 PROCEDURE — 87077 CULTURE AEROBIC IDENTIFY: CPT | Performed by: PHYSICIAN ASSISTANT

## 2017-03-06 PROCEDURE — 86140 C-REACTIVE PROTEIN: CPT | Performed by: PHYSICIAN ASSISTANT

## 2017-03-06 PROCEDURE — 97165 OT EVAL LOW COMPLEX 30 MIN: CPT | Performed by: OCCUPATIONAL THERAPIST

## 2017-03-06 PROCEDURE — 99218 HC RM OBSERVATION: CPT

## 2017-03-06 PROCEDURE — 97161 PT EVAL LOW COMPLEX 20 MIN: CPT

## 2017-03-06 PROCEDURE — 87186 SC STD MICRODIL/AGAR DIL: CPT | Performed by: PHYSICIAN ASSISTANT

## 2017-03-06 RX ORDER — SODIUM CHLORIDE 9 MG/ML
100 INJECTION, SOLUTION INTRAVENOUS CONTINUOUS
Status: DISCONTINUED | OUTPATIENT
Start: 2017-03-06 | End: 2017-03-07

## 2017-03-06 RX ADMIN — DEXAMETHASONE SODIUM PHOSPHATE 6 MG: 4 INJECTION, SOLUTION INTRAMUSCULAR; INTRAVENOUS at 04:54

## 2017-03-06 RX ADMIN — DEXAMETHASONE SODIUM PHOSPHATE 6 MG: 4 INJECTION, SOLUTION INTRAMUSCULAR; INTRAVENOUS at 15:11

## 2017-03-06 RX ADMIN — DIAZEPAM 5 MG: 5 TABLET ORAL at 12:51

## 2017-03-06 RX ADMIN — Medication 10 ML: at 15:11

## 2017-03-06 RX ADMIN — DIAZEPAM 5 MG: 5 TABLET ORAL at 19:08

## 2017-03-06 RX ADMIN — SODIUM CHLORIDE 100 ML/HR: 900 INJECTION, SOLUTION INTRAVENOUS at 22:35

## 2017-03-06 RX ADMIN — DEXAMETHASONE SODIUM PHOSPHATE 6 MG: 4 INJECTION, SOLUTION INTRAMUSCULAR; INTRAVENOUS at 08:43

## 2017-03-06 RX ADMIN — HYDROMORPHONE HYDROCHLORIDE 1 MG: 1 INJECTION, SOLUTION INTRAMUSCULAR; INTRAVENOUS; SUBCUTANEOUS at 11:15

## 2017-03-06 RX ADMIN — DIAZEPAM 5 MG: 5 TABLET ORAL at 04:54

## 2017-03-06 RX ADMIN — HYDROMORPHONE HYDROCHLORIDE 1 MG: 1 INJECTION, SOLUTION INTRAMUSCULAR; INTRAVENOUS; SUBCUTANEOUS at 02:29

## 2017-03-06 RX ADMIN — HYDROMORPHONE HYDROCHLORIDE 4 MG: 2 TABLET ORAL at 12:51

## 2017-03-06 RX ADMIN — HYDROMORPHONE HYDROCHLORIDE 1 MG: 1 INJECTION, SOLUTION INTRAMUSCULAR; INTRAVENOUS; SUBCUTANEOUS at 05:27

## 2017-03-06 RX ADMIN — Medication 10 ML: at 22:35

## 2017-03-06 RX ADMIN — HYDROMORPHONE HYDROCHLORIDE 1 MG: 1 INJECTION, SOLUTION INTRAMUSCULAR; INTRAVENOUS; SUBCUTANEOUS at 08:43

## 2017-03-06 RX ADMIN — HYDROMORPHONE HYDROCHLORIDE 1 MG: 1 INJECTION, SOLUTION INTRAMUSCULAR; INTRAVENOUS; SUBCUTANEOUS at 19:08

## 2017-03-06 RX ADMIN — HYDROMORPHONE HYDROCHLORIDE 4 MG: 2 TABLET ORAL at 22:29

## 2017-03-06 RX ADMIN — HYDROMORPHONE HYDROCHLORIDE 1 MG: 1 INJECTION, SOLUTION INTRAMUSCULAR; INTRAVENOUS; SUBCUTANEOUS at 15:11

## 2017-03-06 RX ADMIN — HYDROMORPHONE HYDROCHLORIDE 4 MG: 2 TABLET ORAL at 02:21

## 2017-03-06 RX ADMIN — HYDROMORPHONE HYDROCHLORIDE 2 MG: 2 TABLET ORAL at 17:38

## 2017-03-06 NOTE — ED PROVIDER NOTES
HPI Comments: 33 y.o. female with past medical history significant for back surgery and lasik eye surgery who presents from home via EMS with chief complaint of lower back pain. Pt is 1 month s/p right L5-S1 microdiscectomy with ortho Dr. Janelle Chavis. She reports she was doing well post op until yesterday morning when she developed muscle soreness to the bilateral lower back (L>R). Her pain gradually worsened as the day progressed and she took Valium and Hydrocodone she had left over from her surgery. She states she took both medications at 0200, 0400, and 0700 today, but her pain worsened to the point where she couldn't move or stand, prompting her to come to the ED. She had a negative x-ray in the ED and was discharged home with Dilaudid and Ativan and with instructions to see ortho tomorrow. However, pt states her bilateral lower back pain and spasms have been excruciating this evening despite taking the Diluadid and Valium as prescribed (q 4 hours), prompting her to return to the ED. She describes current pain as 6/10 pain, but admits pain improved after taking Valium and Dilaudid about 2 hours ago and fentanyl via EMS. She also endorses a slight fever (Tmax 100) earlier today, but none currently. She does however, note some \"uncontrollable\" chills when her pain is at it's worst. She denies bilateral leg pain. There are no other acute medical concerns at this time. Old chart review: MRI results from earlier today were reviewed and shows: hemilaminotomy defect on the right at L5-S1. No central canal stenosis is present. Mild broad-based disc protrusion extends to the right paracentral and right lateral recess region of L5-S1. Small disc extrusion at the right lateral  recess of L5-S1. The right neural foramen is patent. There is moderate  effacement of nerve roots extending to the right S1 foramen. .     Social hx: Never smoker; occasional EtOH use; no illicit drug use. PCP: Jose Alejandro Wright.  Daisy Bell MD  Ortho: Kecia Mina MD    Note written by Marisa Meyer. Leidy Moulton, as dictated by Elvie Hernandez, DO 8:32 PM      The history is provided by the patient. Past Medical History:   Diagnosis Date    Ill-defined condition     elevated cholesterol       Past Surgical History:   Procedure Laterality Date    HX OTHER SURGICAL      Lasik eye surgery         History reviewed. No pertinent family history. Social History     Social History    Marital status:      Spouse name: N/A    Number of children: N/A    Years of education: N/A     Occupational History    Not on file. Social History Main Topics    Smoking status: Never Smoker    Smokeless tobacco: Never Used    Alcohol use 2.4 oz/week     2 Glasses of wine, 2 Cans of beer per week    Drug use: No    Sexual activity: Not on file     Other Topics Concern    Not on file     Social History Narrative     ALLERGIES: Review of patient's allergies indicates no known allergies. Review of Systems   Constitutional: Positive for chills. Negative for appetite change, fever and unexpected weight change. HENT: Negative for ear pain, hearing loss, rhinorrhea and trouble swallowing. Eyes: Negative for pain and visual disturbance. Respiratory: Negative for cough, chest tightness and shortness of breath. Cardiovascular: Negative for chest pain and palpitations. Gastrointestinal: Negative for abdominal distention, abdominal pain, blood in stool and vomiting. Genitourinary: Negative for dysuria, hematuria and urgency. Musculoskeletal: Positive for back pain (bilateral lower back). Negative for myalgias. Skin: Negative for rash. Neurological: Negative for dizziness, syncope, weakness and numbness. Psychiatric/Behavioral: Negative for confusion and suicidal ideas. All other systems reviewed and are negative.       Vitals:    03/05/17 2017   BP: 129/86   Pulse: (!) 107   Resp: 18   Temp: 98.9 °F (37.2 °C)   SpO2: 99%   Weight: 49.4 kg (109 lb) Height: 5' 3\" (1.6 m)            Physical Exam   Constitutional: She is oriented to person, place, and time. She appears well-developed and well-nourished. No distress. HENT:   Head: Normocephalic and atraumatic. Right Ear: External ear normal.   Left Ear: External ear normal.   Nose: Nose normal.   Mouth/Throat: Oropharynx is clear and moist. No oropharyngeal exudate. Eyes: Conjunctivae and EOM are normal. Pupils are equal, round, and reactive to light. Right eye exhibits no discharge. Left eye exhibits no discharge. No scleral icterus. Neck: Normal range of motion. Neck supple. No JVD present. No tracheal deviation present. Cardiovascular: Regular rhythm, normal heart sounds and intact distal pulses. Tachycardia present. Exam reveals no gallop and no friction rub. No murmur heard. Pulmonary/Chest: Effort normal and breath sounds normal. No stridor. No respiratory distress. She has no decreased breath sounds. She has no wheezes. She has no rhonchi. She has no rales. She exhibits no tenderness. Abdominal: Soft. Bowel sounds are normal. She exhibits no distension. There is no tenderness. There is no rebound and no guarding. Musculoskeletal: Normal range of motion. She exhibits tenderness. She exhibits no edema. Lumbar back: She exhibits tenderness (bilateral parapsinal muscles) and spasm (bilateral paraspinal muscles). Back:    Neurological: She is alert and oriented to person, place, and time. She has normal strength and normal reflexes. No cranial nerve deficit or sensory deficit. She exhibits normal muscle tone. Coordination normal. GCS eye subscore is 4. GCS verbal subscore is 5. GCS motor subscore is 6. Reflex Scores:       Patellar reflexes are 2+ on the right side and 2+ on the left side. Achilles reflexes are 2+ on the right side and 2+ on the left side. Strength is 5/5 in the lower extremities. Skin: Skin is warm and dry. No rash noted. She is not diaphoretic. No erythema. No pallor. Psychiatric: She has a normal mood and affect. Her behavior is normal. Judgment and thought content normal.   Nursing note and vitals reviewed. Note written by Derrek Aguilar. Rika Langston, as dictated by Cristy Dwyer DO 8:32 PM    MDM  Number of Diagnoses or Management Options  Back muscle spasm:   Intractable low back pain:      Amount and/or Complexity of Data Reviewed  Clinical lab tests: ordered and reviewed  Tests in the radiology section of CPT®: reviewed  Discuss the patient with other providers: yes (Ortho MLP)    Risk of Complications, Morbidity, and/or Mortality  Presenting problems: moderate  Diagnostic procedures: low  Management options: moderate    Patient Progress  Patient progress: stable    ED Course       Procedures    CONSULT NOTE:  9:09 PM Cristy Dwyer DO spoke with RUPINDER Yao, Consult for Orthopedics. Discussed available diagnostic tests and clinical findings. Juno Alanis will contact the ortho attending and call back with the plan. CONSULT NOTE:  9:37 PM Cristy Dwyer DO spoke with RUPINDER Yao, Consult for Orthopedics. Discussed available diagnostic tests and clinical findings. He is in agreement with care plans as outlined. Juno Alanis will see and admit pt for further evaluation of treatment. Chief Complaint   Patient presents with    Back Pain       9:50 PM  The patients presenting problems have been discussed, and they are in agreement with the care plan formulated and outlined with them. I have encouraged them to ask questions as they arise throughout their visit.     MEDICATIONS GIVEN:  Medications   sodium chloride (NS) flush 5-10 mL (not administered)   sodium chloride (NS) flush 5-10 mL (not administered)   diazePAM (VALIUM) tablet 5 mg (not administered)   HYDROmorphone (DILAUDID) tablet 2 mg (not administered)   HYDROmorphone (DILAUDID) tablet 4 mg (not administered)   HYDROmorphone (PF) (DILAUDID) injection 1 mg (not administered)   sodium chloride (NS) flush 5-10 mL (not administered)   sodium chloride (NS) flush 5-10 mL (not administered)   dexamethasone (DECADRON) 4 mg/mL injection 6 mg (not administered)   ondansetron (ZOFRAN) injection 6 mg (not administered)   HYDROmorphone (PF) (DILAUDID) injection 1 mg (not administered)   HYDROmorphone (PF) (DILAUDID) injection 1 mg (1 mg IntraVENous Given 3/5/17 2039)       LABS REVIEWED:  Recent Results (from the past 24 hour(s))   CBC WITH AUTOMATED DIFF    Collection Time: 03/05/17 10:56 AM   Result Value Ref Range    WBC 10.2 3.6 - 11.0 K/uL    RBC 3.97 3.80 - 5.20 M/uL    HGB 12.5 11.5 - 16.0 g/dL    HCT 37.8 35.0 - 47.0 %    MCV 95.2 80.0 - 99.0 FL    MCH 31.5 26.0 - 34.0 PG    MCHC 33.1 30.0 - 36.5 g/dL    RDW 12.8 11.5 - 14.5 %    PLATELET 851 599 - 368 K/uL    NEUTROPHILS 92 (H) 32 - 75 %    LYMPHOCYTES 4 (L) 12 - 49 %    MONOCYTES 4 (L) 5 - 13 %    EOSINOPHILS 0 0 - 7 %    BASOPHILS 0 0 - 1 %    ABS. NEUTROPHILS 9.4 (H) 1.8 - 8.0 K/UL    ABS. LYMPHOCYTES 0.4 (L) 0.8 - 3.5 K/UL    ABS. MONOCYTES 0.4 0.0 - 1.0 K/UL    ABS. EOSINOPHILS 0.0 0.0 - 0.4 K/UL    ABS. BASOPHILS 0.0 0.0 - 0.1 K/UL    RBC COMMENTS NORMOCYTIC, NORMOCHROMIC     METABOLIC PANEL, COMPREHENSIVE    Collection Time: 03/05/17 10:56 AM   Result Value Ref Range    Sodium 135 (L) 136 - 145 mmol/L    Potassium 3.9 3.5 - 5.1 mmol/L    Chloride 100 97 - 108 mmol/L    CO2 26 21 - 32 mmol/L    Anion gap 9 5 - 15 mmol/L    Glucose 99 65 - 100 mg/dL    BUN 11 6 - 20 MG/DL    Creatinine 0.89 0.55 - 1.02 MG/DL    BUN/Creatinine ratio 12 12 - 20      GFR est AA >60 >60 ml/min/1.73m2    GFR est non-AA >60 >60 ml/min/1.73m2    Calcium 8.7 8.5 - 10.1 MG/DL    Bilirubin, total 0.7 0.2 - 1.0 MG/DL    ALT (SGPT) 27 12 - 78 U/L    AST (SGOT) 17 15 - 37 U/L    Alk.  phosphatase 47 45 - 117 U/L    Protein, total 6.9 6.4 - 8.2 g/dL    Albumin 3.3 (L) 3.5 - 5.0 g/dL    Globulin 3.6 2.0 - 4.0 g/dL    A-G Ratio 0.9 (L) 1.1 - 2.2     CBC WITH AUTOMATED DIFF    Collection Time: 03/05/17  8:38 PM   Result Value Ref Range    WBC 10.1 3.6 - 11.0 K/uL    RBC 4.02 3.80 - 5.20 M/uL    HGB 12.7 11.5 - 16.0 g/dL    HCT 38.3 35.0 - 47.0 %    MCV 95.3 80.0 - 99.0 FL    MCH 31.6 26.0 - 34.0 PG    MCHC 33.2 30.0 - 36.5 g/dL    RDW 12.8 11.5 - 14.5 %    PLATELET 881 948 - 487 K/uL    NEUTROPHILS 95 (H) 32 - 75 %    LYMPHOCYTES 3 (L) 12 - 49 %    MONOCYTES 2 (L) 5 - 13 %    EOSINOPHILS 0 0 - 7 %    BASOPHILS 0 0 - 1 %    ABS. NEUTROPHILS 9.6 (H) 1.8 - 8.0 K/UL    ABS. LYMPHOCYTES 0.3 (L) 0.8 - 3.5 K/UL    ABS. MONOCYTES 0.2 0.0 - 1.0 K/UL    ABS. EOSINOPHILS 0.0 0.0 - 0.4 K/UL    ABS. BASOPHILS 0.0 0.0 - 0.1 K/UL    DF SMEAR SCANNED      RBC COMMENTS NORMOCYTIC, NORMOCHROMIC     METABOLIC PANEL, BASIC    Collection Time: 03/05/17  8:38 PM   Result Value Ref Range    Sodium 134 (L) 136 - 145 mmol/L    Potassium 4.1 3.5 - 5.1 mmol/L    Chloride 98 97 - 108 mmol/L    CO2 27 21 - 32 mmol/L    Anion gap 9 5 - 15 mmol/L    Glucose 140 (H) 65 - 100 mg/dL    BUN 8 6 - 20 MG/DL    Creatinine 0.78 0.55 - 1.02 MG/DL    BUN/Creatinine ratio 10 (L) 12 - 20      GFR est AA >60 >60 ml/min/1.73m2    GFR est non-AA >60 >60 ml/min/1.73m2    Calcium 8.7 8.5 - 10.1 MG/DL       VITAL SIGNS:  Patient Vitals for the past 12 hrs:   Temp Pulse Resp BP SpO2   03/05/17 2017 98.9 °F (37.2 °C) (!) 107 18 129/86 99 %       RADIOLOGY RESULTS:  The following have been ordered and reviewed:  No orders to display     CONSULTATIONS:   Ortho MLP    PROGRESS NOTES:  Discussed results and plan with patient. Patient will be admitted/observed for further evaluation and treatment. DIAGNOSIS:    1. Intractable low back pain    2. Back muscle spasm        PLAN:  Admit/obs    ED COURSE: The patients hospital course has been uncomplicated.

## 2017-03-06 NOTE — PROGRESS NOTES
Bedside and Verbal shift change report given to Lg Ross RN (oncoming nurse) by Brook Boas, RN (offgoing nurse). Report included the following information SBAR, Kardex, ED Summary, Procedure Summary, Intake/Output and MAR.    3187- STAT labs ordered. Done       Bedside and Verbal shift change report given to Pinky Aguilera RN (oncoming nurse) by Kari Lucas RN (offgoing nurse). Report included the following information SBAR, Kardex, ED Summary, Procedure Summary, Intake/Output and MAR.

## 2017-03-06 NOTE — PROGRESS NOTES
[facility]: MED RECONCILIATION    Comments/Recommendations:   Patient is aware to not take oxycodone/apap, hydrocodone/apap, hydromorphone at the same time. The patient selects one pain medication based on the level of her pain and waits the duration of the dosing interval prior to taking another dose or selecting another narcotic pain medication    Medications added:     · Hydrocodone/apap    Medications removed:    · Lorazepam    Medications adjusted:    · Junel added frequency    Requested patient bring in home medication Junel in original packaging if ordered by physician and to notify nurse when the medications arrives. Reviewed the hospitals policy for the use of patients own medication while admitted including the following:   · The medication will be identified by a pharmacist and placed in a zip lock bag with a barcoded label specifically for the patient. · The medication will be kept in a locked medication cabinet called Neiron in a drawer specific for the patient  · An administration note will be added to the order instructing the nurse where she or he may locate the medication  · A note will be entered on the discharge paperwork instructing the nurse to return this medication to the patient at discharge  · Family confirms they can provide tomorrow     Information obtained from: family at bedside    Significant PMH/Disease States: There is no problem list on file for this patient. Past Medical History:   Diagnosis Date    Ill-defined condition     elevated cholesterol     Chief Complaint for this Admission:   Chief Complaint   Patient presents with    Back Pain     Allergies: Review of patient's allergies indicates no known allergies.     Prior to Admission Medications:     Medication Documentation Review Audit       Reviewed by Allen Desai PHARMD (Pharmacist) on 03/05/17 at 2202         Medication Sig Documenting Provider Last Dose Status Taking?      diazePAM (VALIUM) 5 mg tablet Take 1 Tab by mouth every six (6) hours as needed (spasm) for up to 20 doses. Max Daily Amount: 20 mg. Sourav Balderrama MD 3/5/2017 7pm Active Yes    HYDROcodone-acetaminophen (NORCO) 7.5-325 mg per tablet Take 1 Tab by mouth every six (6) hours as needed. Historical Provider  Active Yes    HYDROmorphone (DILAUDID) 2 mg tablet Take 1 Tab by mouth every four (4) hours as needed for Pain. Max Daily Amount: 12 mg. Sourav Balderrama MD 3/5/2017 7pm Active Yes    multivitamin (ONE A DAY) tablet Take 1 Tab by mouth daily. Historical Provider  Active Yes    NORETHINDRONE-E.ESTRADIOL-IRON (JUNEL FE 1/20, 28, PO) Take 1 Tab by mouth daily. Historical Provider 3/5/2017 Unknown time Active Yes    oxyCODONE-acetaminophen (PERCOCET) 5-325 mg per tablet Take 1 Tab by mouth every four (4) hours as needed for Pain.  Historical Provider  Active Yes                  Thank you,    Corrina Barcenas, PharmD, BCPS

## 2017-03-06 NOTE — PROGRESS NOTES
ORTHOPAEDIC SPINE SURGERY PROGRESS NOTE    NAME:     Lacey Morton   :       1985   MRN:       [id number]   DATE:      3/6/2017    HPI:  Lacey Morton is a 33 y.o. female with a hx of having a Right sided L5-S1 microdiscectomy performed on 17. She was admitted to the Orthopedic service last night for intractable back pain with muscle spasms. Her symptoms started Saturday morning and gradually progressed. She has not had any new injury    Patient recently returned from a vacation in [facility]. She was seen in our office on 3/3/17. She was feeling significantly improved w/o complaint during her last office visit. 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 She did a little shopping on Saturday. She also notes that she did some standing at [facility] on . On , her back spasms progressed to the point of her needing transport to [facility] ED by ambulance. A new lumbar spine MRI was obtained. No emergenet findings were seen. She was treated symptomatically in the ED and then discharge with Dilaudid and Ativan. She returned to the [facility] ED again by ambulance. Upon re-evaluation, she was admitted to [facility] for intractable back pain. MRI Results:  EXAM: MRI LUMB SPINE W WO CONT     HISTORY: Acute onset of low back pain, the patient had a discectomy performed  L5-S1  with sciatica.     INDICATION: bp.     COMPARISON: None     TECHNIQUE: MR imaging of the lumbar spine was performed using the following  sequences: sagittal T1, T2, STIR; axial T1, T2 prior to and following contrast  administration.      CONTRAST: 5 mL of Gadavist.     FINDINGS:     There is normal alignment of the lumbar spine. Vertebral body heights are  maintained. Marrow signal is normal.     The conus medullaris terminates at superior aspect of L1. Signal and caliber of  the distal spinal cord are within normal limits.  There is no pathologic  intrathecal enhancement.     The paraspinal soft tissues are within normal limits.     Lower thoracic spine: No herniation or stenosis.     L1-L2: No herniation or stenosis.     L2-L3: No herniation or stenosis.     L3-L4: Disc desiccation and disc bulge. There is an annular ligament tear along  the inferior margin of the disc. The canal and foramina are widely patent.     L4-L5: Disc bulge. Minimal annular ligament tear centrally. Canal and foramina  are widely patent     L5-S1: Hemilaminotomy defect on the right. Typical postprocedural findings on  the right. There is a mild broad-based disc protrusion which extends the right  paracentral region. At the right lateral recess there is a caudally oriented  extrusion that measures 14.7 mm x 5.5 mm in AP dimension. There is right lateral  recess stenosis which is mild. There is mild effacement of nerve roots extending  to the right S1 foramen. The canal is patent.      IMPRESSION:  Hemilaminotomy defect on the right at L5-S1. No central canal stenosis is  present. Mild broad-based disc protrusion extends to the right paracentral and  right lateral recess region of L5-S1. Small disc extrusion at the right lateral  recess of L5-S1. The right neural foramen is patent. There is moderate  effacement of nerve roots extending to the right S1 foramen.     SUBJECTIVE:  C/O diffuse back pain and back spasms  No leg pain, weakness or numbness  She has had some chills and nausea when her pain is severe  Denies vomiting, headache, chest pain, shortness of breath, fever, bowel/bladder dysfunction or saddle paresthesias    Recent Labs      03/05/17   2038   HGB  12.7   HCT  38.3   NA  134*   K  4.1   CL  98   CO2  27   BUN  8   CREA  0.78   GLU  140*     Patient Vitals for the past 12 hrs:   BP Temp Pulse Resp SpO2 Height Weight   03/06/17 0455 120/69 98 °F (36.7 °C) (!) 102 16 99 % - -   03/05/17 2315 115/69 - - - - - -   03/05/17 2244 141/89 98.2 °F (36.8 °C) (!) 108 16 96 % - -   03/05/17 2215 116/84 - (!) 112 16 96 % - -   03/05/17 2200 125/83 - - - 98 % - -   03/05/17 2145 120/88 - - - 98 % - -   03/05/17 2130 101/87 - - - 98 % - -   03/05/17 2115 121/82 - - - 100 % - -   03/05/17 2100 118/76 - - - 99 % - -   03/05/17 2030 (!) 139/99 - - - 95 % - -   03/05/17 2017 129/86 98.9 °F (37.2 °C) (!) 107 18 99 % 5' 3\" (1.6 m) 49.4 kg (109 lb)       EXAM:  Nursing note and vitals reviewed. Constitutional: Well-developed and well-nourished. Appearance and behavior are age appropriate. Non-toxic. Not ill-appearing. Appears uncomfortable due to pain     HEENT: Head: NC/AT      Eyes: PERRL. Conjunctivae nml. EOMs intact bilat     Nose: Normal    Mouth/Throat: MMs moist and nml in appearance. O/P clear and moist      Neck: Supple, symmetrical w/ Nml ROM. No masses or JVD. Cardiovascular: RRR. No m/r/g     Pulmonary: Effort nml. Lungs CTA BL. No w/r/r      Abdomen: Soft. Non-distended. Nml BS in all quadrants. NTTP    Musculoskeletal:   Incision is healing well. No erythema, swelling, increased warmth or drainage   Positive strength/ROM bilat lower ext. Neuro intact to sensation  Calves, soft & nontender  2+ peripheral pulses in all extremities. No peripheral edema    Neurologic: Alert, awake and appropriate. Non-focal exam     Skin: Skin is warm and dry      PLAN:  ESR and CRP results pending  Continue PO pain medications  PT/OT, OOB  Advance regular diet    RUPINDER Delgado  Orthopaedic Surgery  Physician Assistant to Dr. Casper Miranda    11:15 AM  Discussed ESR and CRP results w/ Dr. Lilliam Daniel. Will aspirate fluid just beneath incision site using sterile technique. Will send for cultures. Risks vs. Benefits of aspiration were discussed with patient including pain, bleeding, infection, scarring, damage to underlying tissues. Pt expresses understanding and gave verbal consent to proceed with aspiration procedure. Verbal consent witnessed by [employer].      Procedure Note - Aspiration:   11:45 AM  Performed by: Bharat Hauser PA  Complexity: simple   Skin prepped with Betadine and Alcohol. Sterile field established. Sterile 22 gauge needle was used to aspirate approximately 3 cc's of cloudy serous fluid from incision site of lower back. Sterile dressing applied. Cultures sent  The procedure took 1-15 minutes, and pt tolerated well.   Written by RUPINDER Metzger

## 2017-03-06 NOTE — H&P
H&P      Pt. Has returned to the ER tonight. Intractable pain, no neuro deficits, no radiculopathy or myopathy. Musculoskeletal pain with intractable spasming of the lumbar not controlled at home. PT to eval in AM.    I had Dr. Freedom Kramer review MRI, no findings  Requiring emergent surgery. Will admit to obs for pain control tonight. Appreciate Dr. Lizeth Coleman seeing pt. in AM. See H&P below. ALYSSA ZelayaC  Orthopaedic Surgery [address]  Pgr. [phone]        ORTHO CONSULT     Subjective:      Date of Consultation: March 5, 2017     Referring Physician: Dr. Lindsay Israel is a 33 y.o. female we are consulted to see for intractable back pain with muscle spasms for 24hrs. Pt. States was standing at a [employer] yesterday, soreness in lower back last evening with muscle spasms. Pt. Took one oxycodone last night with 5mg Valium. This helped but worsening spasms this morning. Pt. Unable to ambulate, had ambulance take her to the ER. She received Fentanyl en route and toradol here in ER.      Pt. Had microdiscectomy with Dr. Lizeth Coleman on 2/6/17. Follow up 2 days ago without incident with his PA, Sherry Merino.     Denies fever at home.     There are no active problems to display for this patient.     No family history on file. Social History    Substance Use Topics     Smoking status: Never Smoker     Smokeless tobacco: Not on file     Alcohol use 2.4 oz/week       2 Glasses of wine, 2 Cans of beer per week           Past Medical History:   Diagnosis Date    Ill-defined condition       elevated cholesterol            Past Surgical History:   Procedure Laterality Date    HX OTHER SURGICAL         Lasik eye surgery              Prior to Admission medications    Medication Sig Start Date End Date Taking? Authorizing Provider   HYDROmorphone (DILAUDID) 2 mg tablet Take 1 Tab by mouth every four (4) hours as needed for Pain.  Max Daily Amount: 12 mg. 3/5/17   Yes Lazarus Irving, MD   LORazepam (ATIVAN) 2 mg tablet Take 1 Tab by mouth every eight (8) hours as needed for up to 20 doses. Max Daily Amount: 6 mg. 3/5/17   Yes Lazarus Irving, MD   multivitamin (ONE A DAY) tablet Take 1 Tab by mouth daily.       Historical Provider   NORETHINDRONE-E.ESTRADIOL-IRON (Julius Seam FE , PO) Take by mouth.       Historical Provider   diazePAM (VALIUM) 5 mg tablet Take 5 mg by mouth every six (6) hours as needed for Anxiety.       Historical Provider   oxyCODONE-acetaminophen (PERCOCET) 5-325 mg per tablet Take 1 Tab by mouth every four (4) hours as needed for Pain.       Historical Provider      No current facility-administered medications for this encounter.            Current Outpatient Prescriptions   Medication Sig    HYDROmorphone (DILAUDID) 2 mg tablet Take 1 Tab by mouth every four (4) hours as needed for Pain. Max Daily Amount: 12 mg.  LORazepam (ATIVAN) 2 mg tablet Take 1 Tab by mouth every eight (8) hours as needed for up to 20 doses. Max Daily Amount: 6 mg.  multivitamin (ONE A DAY) tablet Take 1 Tab by mouth daily.  NORETHINDRONE-E.ESTRADIOL-IRON (JUNEL , PO) Take by mouth.  diazePAM (VALIUM) 5 mg tablet Take 5 mg by mouth every six (6) hours as needed for Anxiety.  oxyCODONE-acetaminophen (PERCOCET) 5-325 mg per tablet Take 1 Tab by mouth every four (4) hours as needed for Pain.      No Known Allergies      Review of Systems: A comprehensive review of systems was negative except for that written in the HPI.     Objective:      Patient Vitals for the past 8 hrs:    BP Temp Pulse Resp SpO2 Height Weight   17 1000 116/79 - - - 98 % - -   17 0915 - - - - 99 % - -   17 0900 116/72 - - - - - -   17 0859 116/72 100 °F (37.8 °C) (!) 102 16 100 % 5' 3\" (1.6 m) 49.4 kg (109 lb)      Temp (24hrs), Av °F (37.8 °C), Min:100 °F (37.8 °C), Max:100 °F (37.8 °C)           EXAM: I examined pt's L spine.  No Spinous process pain. No paraspinous process pain on exam. Strength 5/5 BLE's, DTR 2+ BLE's, +Dorsi/plantar flexion BLE's. NVI distally BLE's. Cap. Refill <2secs all toes. Motor/Sensory intact BLE's. When torsioning in bed, palpable muscle spasms surrounding the lumbar region bilaterally.        Data Review    Recent Results    No results found for this or any previous visit (from the past 24 hour(s)). MRI w and wo pending L spine  Labs pending           Assessment/Plan:      Musculo/Skeletal LBP        Decadron IV, Dilaudid 4mg PO, Valium 2.5mg IV, Ice to Lower back, post residual bladder scan           UPDATE:  MRI L spine:  EXAM: MRI LUMB SPINE W WO CONT          HISTORY: Acute onset of low back pain, the patient had a discectomy performed  L5-S1 February 6 with sciatica.         INDICATION: bp.      COMPARISON: None      TECHNIQUE: MR imaging of the lumbar spine was performed using the following  sequences: sagittal T1, T2, STIR; axial T1, T2 prior to and following contrast  administration.       CONTRAST: 5 mL of Gadavist.      FINDINGS:      There is normal alignment of the lumbar spine. Vertebral body heights are  maintained. Marrow signal is normal.      The conus medullaris terminates at superior aspect of L1. Signal and caliber of  the distal spinal cord are within normal limits. There is no pathologic  intrathecal enhancement.      The paraspinal soft tissues are within normal limits.      Lower thoracic spine: No herniation or stenosis.      L1-L2: No herniation or stenosis.      L2-L3: No herniation or stenosis.      L3-L4: Disc desiccation and disc bulge. There is an annular ligament tear along  the inferior margin of the disc. The canal and foramina are widely patent.      L4-L5: Disc bulge. Minimal annular ligament tear centrally. Canal and foramina  are widely patent      L5-S1: Hemilaminotomy defect on the right. Typical postprocedural findings on  the right.  There is a mild broad-based disc protrusion which extends the right  paracentral region. At the right lateral recess there is a caudally oriented  extrusion that measures 14.7 mm x 5.5 mm in AP dimension. There is right lateral  recess stenosis which is mild. There is mild effacement of nerve roots extending  to the right S1 foramen. The canal is patent.      IMPRESSION  IMPRESSION:  Hemilaminotomy defect on the right at L5-S1. No central canal stenosis is  present. Mild broad-based disc protrusion extends to the right paracentral and  right lateral recess region of L5-S1. Small disc extrusion at the right lateral  recess of L5-S1. The right neural foramen is patent. There is moderate  effacement of nerve roots extending to the right S1 foramen.              No evidence of discitis, no wound infection. MRI from 2 months ago done at imaging center, so unable to compare findings of annular tears could be old.     No stenosis, central canal and foramen are patent. Pt. Post void residual is 0ml per ER Memo PALACIOS. Pt. Up to bathroom, tolerating some ambulation.     Recommendation, Home with Dilaudid PO and Valium PO, Ice to lower back. Limit ambulation. I have called [facility] and they will call pt. Tomorrow to schedule follow up and will compare MRI with previous one prior to surgery. Dr. America Jordan agrees with plan.     Thank you for allowing us to take part in this patients care.        Brett Otero PA-C  Orthopaedic Surgery [address]  Pgr.  [phone]           Brett Otero PA-C

## 2017-03-06 NOTE — PROGRESS NOTES
Problem: Mobility Impaired (Adult and Pediatric)  Goal: *Acute Goals and Plan of Care (Insert Text)  Physical Therapy Goals  Initiated 3/6/2017  1. Patient will move from supine to sit and sit to supine in bed with modified independence within 7 day(s). 2. Patient will transfer from bed to chair and chair to bed with modified independence using the least restrictive device within 7 day(s). 3. Patient will perform sit to stand with modified independence within 7 day(s). 4. Patient will ambulate with independence for 200 feet with the least restrictive device within 7 day(s). 5. Patient will ascend/descend 3 stairs with 1 handrail(s) with modified independence within 7 day(s). PHYSICAL THERAPY EVALUATION  Patient: Abram Plasencia (39 y.o. female)  Date: 3/6/2017  Primary Diagnosis: Intractable Lower Back Pain  Disc Herniation  Procedure(s) (LRB):  RIGHT SIDE L5-S1 SPINE LUMBAR LAMINECTOMY MICRODISCECTOMY/REVISION (Right)     Precautions:   Fall, Back      ASSESSMENT :  Based on the objective data described below, the patient presents with high guard with all mobility d/t anticipated pain and deficits with following her back precautions during functional activities. Reviewed log roll technique and bed mobility without using the hospital bed to facilitate. Encouraged a natural gait patter with improved step length and speed but continued to note reduced arm swing. This patient is 1 month out from a microdiscectomy and was \"doing great\" until muscle spasms set in 3/5. Anticipate great progress once pain and spasms are controlled. Will follow x1-2 additional visits to ensure that she is following precautions during activity. No discharge needs anticipated. Patient will benefit from skilled intervention to address the above impairments.   Patients rehabilitation potential is considered to be Good  Factors which may influence rehabilitation potential include:   [ ]         None noted  [ ]         Mental ability/status  [ ]         Medical condition  [ ]         Home/family situation and support systems  [ ]         Safety awareness  [X]         Pain tolerance/management  [ ]         Other:        PLAN :  Recommendations and Planned Interventions:  [X]           Bed Mobility Training             [ ]    Neuromuscular Re-Education  [X]           Transfer Training                   [ ]    Orthotic/Prosthetic Training  [X]           Gait Training                         [ ]    Modalities  [X]           Therapeutic Exercises           [ ]    Edema Management/Control  [X]           Therapeutic Activities            [ ]    Patient and Family Training/Education  [ ]           Other (comment):     Frequency/Duration: Patient will be followed by physical therapy  3 times a week to address goals. Discharge Recommendations: None  Further Equipment Recommendations for Discharge: None       SUBJECTIVE:   Patient stated I feel okay right now.       OBJECTIVE DATA SUMMARY:   HISTORY:    Past Medical History:   Diagnosis Date    Ill-defined condition       elevated cholesterol     Past Surgical History:   Procedure Laterality Date    HX OTHER SURGICAL         Lasik eye surgery     Prior Level of Function/Home Situation: independent and active  Personal factors and/or comorbidities impacting plan of care:      Home Situation  Home Environment: Private residence  # Steps to Enter: 0  Wheelchair Ramp: Yes  One/Two Story Residence: One story  Living Alone: No  Support Systems: Parent, Spouse/Significant Other/Partner  Patient Expects to be Discharged to[de-identified] Private residence  Current DME Used/Available at Home: None  Tub or Shower Type: Shower     EXAMINATION/PRESENTATION/DECISION MAKING:   Critical Behavior:  Neurologic State: Alert, Appropriate for age  Orientation Level: Oriented X4  Cognition: Appropriate decision making, Appropriate for age attention/concentration, Appropriate safety awareness, Follows commands  Safety/Judgement: Awareness of environment, Fall prevention, Good awareness of safety precautions, Home safety, Insight into deficits  Skin:  Intact as observed  Strength:    Strength: Generally decreased, functional                    Tone & Sensation:   Tone: Normal              Sensation: Intact               Range Of Motion:  AROM: Generally decreased, functional (slow and cautious ROM due to back pain)           PROM: Within functional limits           Coordination:  Coordination: Within functional limits     Functional Mobility:  Bed Mobility:  Rolling: Supervision  Supine to Sit: Contact guard assistance; Additional time  Sit to Supine: Additional time;Supervision   Reviewed log roll technique and sidelying to sit     Transfers:  Sit to Stand: Supervision                          Balance:   Sitting: Intact  Standing: Intact  Ambulation/Gait Training:  Distance (ft): 100 Feet (ft)     Ambulation - Level of Assistance: Stand-by asssistance     Gait Description (WDL): Exceptions to WDL  Gait Abnormalities: Decreased step clearance        Base of Support: Narrowed     Speed/Dawn: Slow              High guard with poor arm swing               Cues to avoid twisting during conversation                      Physical Therapy Evaluation Charge Determination   History Examination Presentation Decision-Making   LOW Complexity : Zero comorbidities / personal factors that will impact the outcome / POC LOW Complexity : 1-2 Standardized tests and measures addressing body structure, function, activity limitation and / or participation in recreation  LOW Complexity : Stable, uncomplicated  LOW Complexity : FOTO score of       Based on the above components, the patient evaluation is determined to be of the following complexity level: LOW      Pain:  Pain Scale 1: Numeric (0 - 10)  Pain Intensity 1: 5  Pain Location 1: Back     Pain Description 1: Aching;Gnawing; Throbbing  Pain Intervention(s) 1: Medication (see MAR)  Activity Tolerance: After treatment:   [ ]         Patient left in no apparent distress sitting up in chair  [X]         Patient left in no apparent distress in bed  [X]         Call bell left within reach  [ ]         Nursing notified  [X]         Caregiver present  [ ]         Bed alarm activated      COMMUNICATION/EDUCATION:   The patients plan of care was discussed with: Registered Nurse.  [X]         Fall prevention education was provided and the patient/caregiver indicated understanding. [X]         Patient/family have participated as able in goal setting and plan of care. [X]         Patient/family agree to work toward stated goals and plan of care. [ ]         Patient understands intent and goals of therapy, but is neutral about his/her participation. [ ]         Patient is unable to participate in goal setting and plan of care.      Thank you for this referral.  Sakina Lane, PT, DPT   Time Calculation: 25 mins

## 2017-03-06 NOTE — ED TRIAGE NOTES
Patient arrives by EMS with c/o lower back pain, onset yesterday, denies trauma. Patient states she has a hx of herniated disc and was operated on 2/6 by Dr Sangeetha Currie.

## 2017-03-06 NOTE — ED NOTES
Patient provider with PO fluids, provider aware. Family at bedside. Call bell within reach, will continue to monitor.

## 2017-03-06 NOTE — PROGRESS NOTES
Occupational Therapy EVALUATION/discharge  Patient: Devon Cabot (24 y.o. female)  Date: 3/6/2017  Primary Diagnosis: Intractable Lower Back Pain  Disc Herniation  Procedure(s) (LRB):  RIGHT SIDE L5-S1 SPINE LUMBAR LAMINECTOMY MICRODISCECTOMY/REVISION (Right)     Precautions:  Fall, Back    ASSESSMENT:   Based on the objective data described below, the patient presents with back pain s/p discectomy 2/6/2017. She currently requires supervision to mod I for all ADLs and functional mobility. Pt is moving slow and cautious due to pain, but with good safety awareness and no LOB. Educated pt and her mother and father on back precautions, back safety and home safety. All 3 verbalized good understanding. Further skilled acute occupational therapy is not indicated at this time. Discharge Recommendations: None for OT  Further Equipment Recommendations for Discharge: none for OT      SUBJECTIVE:   Patient stated I am feeling some better, but I am due for my pain meds.     OBJECTIVE DATA SUMMARY:   HISTORY:   Past Medical History:   Diagnosis Date    Ill-defined condition     elevated cholesterol     Past Surgical History:   Procedure Laterality Date    HX OTHER SURGICAL      Lasik eye surgery       Prior Level of Function/Home Situation: After sx 2/6/2017 independent with ADLs, working as an  and had even travelled  [address]: Private residence  # Steps to Enter: 0  Wheelchair Ramp: Yes  One/Two Story Residence: One story  Living Alone: No  Support Systems: Parent, Spouse/Significant Other/Partner  Patient Expects to be Discharged to[de-identified] Private residence  Current DME Used/Available at Home: None  Tub or Shower Type: Shower  [x]  Right hand dominant   []  Left hand dominant    EXAMINATION OF PERFORMANCE DEFICITS:  Cognitive/Behavioral Status:  Neurologic State: Alert; Appropriate for age  Orientation Level: Oriented X4  Cognition: Appropriate decision making; Appropriate for age attention/concentration; Appropriate safety awareness; Follows commands  Perception: Appears intact  Perseveration: No perseveration noted  Safety/Judgement: Awareness of environment; Fall prevention;Good awareness of safety precautions; Home safety; Insight into deficits    Vision/Perceptual:    Acuity: Within Defined Limits       Range of Motion:  AROM: Generally decreased, functional (slow and cautious ROM due to back pain)  PROM: Within functional limits                    Strength:  Strength: Generally decreased, functional              Coordination:  Coordination: Within functional limits  Fine Motor Skills-Upper: Left Intact; Right Intact    Gross Motor Skills-Upper: Left Intact; Right Intact  Tone & Sensation:  Tone: Normal  Sensation: Intact                      Balance:  Sitting: Intact  Standing: Intact    Functional Mobility and Transfers for ADLs:  Bed Mobility:  Rolling: Supervision  Supine to Sit: Supervision  Sit to Supine: Supervision    Transfers:  Sit to Stand: Supervision  Toilet Transfer : Supervision    ADL Assessment:  Feeding: Independent    Oral Facial Hygiene/Grooming: Supervision (standing at sink)    Bathing: Supervision; Additional time (seated and standing using crossed leg technique )    Upper Body Dressing: Independent    Lower Body Dressing: Supervision (seated and standing using crossed leg technique )    Toileting: Supervision     Cognitive Retraining  Safety/Judgement: Awareness of environment; Fall prevention;Good awareness of safety precautions; Home safety; Insight into deficits    Functional Measure:  Barthel Index:    Bathin  Bladder: 10  Bowels: 10  Groomin  Dressin  Feeding: 10  Mobility: 10  Stairs: 5  Toilet Use: 5  Transfer (Bed to Chair and Back): 10  Total: 70       Barthel and G-code impairment scale:  Percentage of impairment CH  0% CI  1-19% CJ  20-39% CK  40-59% CL  60-79% CM  80-99% CN  100%   Barthel Score 0-100 100 99-80 79-60 59-40 20-39 1-19   0   Barthel Score 0-20 20 17-19 13-16 9-12 5-8 1-4 0      The Barthel ADL Index: Guidelines  1. The index should be used as a record of what a patient does, not as a record of what a patient could do. 2. The main aim is to establish degree of independence from any help, physical or verbal, however minor and for whatever reason. 3. The need for supervision renders the patient not independent. 4. A patient's performance should be established using the best available evidence. Asking the patient, friends/relatives and nurses are the usual sources, but direct observation and common sense are also important. However direct testing is not needed. 5. Usually the patient's performance over the preceding 24-48 hours is important, but occasionally longer periods will be relevant. 6. Middle categories imply that the patient supplies over 50 per cent of the effort. 7. Use of aids to be independent is allowed. Josie Ryan., Barthel, D.W. (7877). Functional evaluation: the Barthel Index. [facility] (14)2. Candice Antonio loren MAT Javier, Phylicia Guillaume., Jm Leonard., [place], [facility] (1999). Measuring the change indisability after inpatient rehabilitation; comparison of the responsiveness of the Barthel Index and Functional [place] Measure. Journal of Neurology, Neurosurgery, and Psychiatry, 66(4), 719-568. Matt Drake N.J.LUZ, ELIZABETH Figueroa, & Zenaida James MBonifacioA. (2004.) Assessment of post-stroke quality of life in cost-effectiveness studies: The usefulness of the Barthel Index and the EuroQoL-5D.  Quality of Life Research, 15, 714-73       Occupational Therapy Evaluation Charge Determination   History Examination Decision-Making   LOW Complexity : Brief history review  LOW Complexity : 1-3 performance deficits relating to physical, cognitive , or psychosocial skils that result in activity limitations and / or participation restrictions  LOW Complexity : No comorbidities that affect functional and no verbal or physical assistance needed to complete eval tasks       Based on the above components, the patient evaluation is determined to be of the following complexity level: LOW   Pain:  Pain Scale 1: Numeric (0 - 10)  Pain Intensity 1: 3  Pain Location 1: Back     Pain Description 1: Aching;Gnawing; Throbbing  Pain Intervention(s) 1: Medication (see MAR)  Activity Tolerance:   Fair  Please refer to the flowsheet for vital signs taken during this treatment. After treatment:   []  Patient left in no apparent distress sitting up in chair  [x]  Patient left in no apparent distress in bed  [x]  Call bell left within reach  [x]  Nursing notified  [x]  Caregiver present - pt's mother and father  []  Bed alarm activated    COMMUNICATION/EDUCATION:   Communication/Collaboration:  [x]      Home safety education was provided and the patient/caregiver indicated understanding. [x]      Patient/family have participated as able and agree with findings and recommendations. []      Patient is unable to participate in plan of care at this time.   Findings and recommendations were discussed with: Physical Therapist and Registered Nurse    Tatyana Hernadez OT  Time Calculation: 15 mins

## 2017-03-06 NOTE — PROGRESS NOTES
CM note:    Met with pt, her , and family. OBS letter provided. No dc needs anticipated at this time. Thanks.   Rosalia Doll LCSW    Care Management Interventions  Current Support Network: Own Home, Lives with Spouse  Confirm Follow Up Transport: Family  Discharge Location  Discharge Placement: Home

## 2017-03-07 ENCOUNTER — ANESTHESIA (OUTPATIENT)
Dept: SURGERY | Age: 32
DRG: 858 | End: 2017-03-07
Payer: COMMERCIAL

## 2017-03-07 ENCOUNTER — APPOINTMENT (OUTPATIENT)
Dept: GENERAL RADIOLOGY | Age: 32
DRG: 858 | End: 2017-03-07
Attending: ORTHOPAEDIC SURGERY
Payer: COMMERCIAL

## 2017-03-07 PROBLEM — M51.26 LUMBAR DISC HERNIATION: Status: ACTIVE | Noted: 2017-03-07

## 2017-03-07 LAB
ANION GAP BLD CALC-SCNC: 6 MMOL/L (ref 5–15)
BASOPHILS # BLD AUTO: 0 K/UL (ref 0–0.1)
BASOPHILS # BLD: 0 % (ref 0–1)
BUN SERPL-MCNC: 7 MG/DL (ref 6–20)
BUN/CREAT SERPL: 8 (ref 12–20)
CALCIUM SERPL-MCNC: 8.5 MG/DL (ref 8.5–10.1)
CHLORIDE SERPL-SCNC: 103 MMOL/L (ref 97–108)
CO2 SERPL-SCNC: 29 MMOL/L (ref 21–32)
CREAT SERPL-MCNC: 0.85 MG/DL (ref 0.55–1.02)
EOSINOPHIL # BLD: 0 K/UL (ref 0–0.4)
EOSINOPHIL NFR BLD: 0 % (ref 0–7)
ERYTHROCYTE [DISTWIDTH] IN BLOOD BY AUTOMATED COUNT: 12.9 % (ref 11.5–14.5)
GLUCOSE SERPL-MCNC: 93 MG/DL (ref 65–100)
HCG SERPL QL: NEGATIVE
HCT VFR BLD AUTO: 38 % (ref 35–47)
HGB BLD-MCNC: 12.3 G/DL (ref 11.5–16)
LYMPHOCYTES # BLD AUTO: 17 % (ref 12–49)
LYMPHOCYTES # BLD: 1.1 K/UL (ref 0.8–3.5)
MCH RBC QN AUTO: 31.4 PG (ref 26–34)
MCHC RBC AUTO-ENTMCNC: 32.4 G/DL (ref 30–36.5)
MCV RBC AUTO: 96.9 FL (ref 80–99)
MONOCYTES # BLD: 0.4 K/UL (ref 0–1)
MONOCYTES NFR BLD AUTO: 6 % (ref 5–13)
NEUTS SEG # BLD: 5.3 K/UL (ref 1.8–8)
NEUTS SEG NFR BLD AUTO: 77 % (ref 32–75)
PLATELET # BLD AUTO: 174 K/UL (ref 150–400)
POTASSIUM SERPL-SCNC: 3.8 MMOL/L (ref 3.5–5.1)
RBC # BLD AUTO: 3.92 M/UL (ref 3.8–5.2)
SODIUM SERPL-SCNC: 138 MMOL/L (ref 136–145)
WBC # BLD AUTO: 6.9 K/UL (ref 3.6–11)

## 2017-03-07 PROCEDURE — 74011250636 HC RX REV CODE- 250/636: Performed by: PHYSICIAN ASSISTANT

## 2017-03-07 PROCEDURE — 77030012406 HC DRN WND PENRS BARD -A: Performed by: ORTHOPAEDIC SURGERY

## 2017-03-07 PROCEDURE — 74011250636 HC RX REV CODE- 250/636: Performed by: ORTHOPAEDIC SURGERY

## 2017-03-07 PROCEDURE — 77030002933 HC SUT MCRYL J&J -A: Performed by: ORTHOPAEDIC SURGERY

## 2017-03-07 PROCEDURE — 65270000029 HC RM PRIVATE

## 2017-03-07 PROCEDURE — 76060000034 HC ANESTHESIA 1.5 TO 2 HR: Performed by: ORTHOPAEDIC SURGERY

## 2017-03-07 PROCEDURE — 87102 FUNGUS ISOLATION CULTURE: CPT | Performed by: ORTHOPAEDIC SURGERY

## 2017-03-07 PROCEDURE — 99218 HC RM OBSERVATION: CPT

## 2017-03-07 PROCEDURE — 77030008684 HC TU ET CUF COVD -B: Performed by: ANESTHESIOLOGY

## 2017-03-07 PROCEDURE — 87186 SC STD MICRODIL/AGAR DIL: CPT | Performed by: ORTHOPAEDIC SURGERY

## 2017-03-07 PROCEDURE — L0627 LO SAG RI AN/POS PNL PRE CST: HCPCS

## 2017-03-07 PROCEDURE — 87077 CULTURE AEROBIC IDENTIFY: CPT | Performed by: ORTHOPAEDIC SURGERY

## 2017-03-07 PROCEDURE — 74011000250 HC RX REV CODE- 250

## 2017-03-07 PROCEDURE — 76000 FLUOROSCOPY <1 HR PHYS/QHP: CPT

## 2017-03-07 PROCEDURE — 74011250636 HC RX REV CODE- 250/636

## 2017-03-07 PROCEDURE — 77030019908 HC STETH ESOPH SIMS -A: Performed by: ANESTHESIOLOGY

## 2017-03-07 PROCEDURE — 85025 COMPLETE CBC W/AUTO DIFF WBC: CPT | Performed by: PHYSICIAN ASSISTANT

## 2017-03-07 PROCEDURE — 77030031139 HC SUT VCRL2 J&J -A: Performed by: ORTHOPAEDIC SURGERY

## 2017-03-07 PROCEDURE — 74011000272 HC RX REV CODE- 272: Performed by: ORTHOPAEDIC SURGERY

## 2017-03-07 PROCEDURE — 87205 SMEAR GRAM STAIN: CPT | Performed by: ORTHOPAEDIC SURGERY

## 2017-03-07 PROCEDURE — 87075 CULTR BACTERIA EXCEPT BLOOD: CPT | Performed by: ORTHOPAEDIC SURGERY

## 2017-03-07 PROCEDURE — 77030018719 HC DRSG PTCH ANTIMIC J&J -A: Performed by: ORTHOPAEDIC SURGERY

## 2017-03-07 PROCEDURE — 77030032490 HC SLV COMPR SCD KNE COVD -B: Performed by: ORTHOPAEDIC SURGERY

## 2017-03-07 PROCEDURE — 77030008467 HC STPLR SKN COVD -B: Performed by: ORTHOPAEDIC SURGERY

## 2017-03-07 PROCEDURE — 77030013079 HC BLNKT BAIR HGGR 3M -A: Performed by: ANESTHESIOLOGY

## 2017-03-07 PROCEDURE — 77030037134 HC WRAP COMPR BACK THER SOLM -B

## 2017-03-07 PROCEDURE — 36415 COLL VENOUS BLD VENIPUNCTURE: CPT | Performed by: PHYSICIAN ASSISTANT

## 2017-03-07 PROCEDURE — 77030018723 HC ELCTRD BLD COVD -A: Performed by: ORTHOPAEDIC SURGERY

## 2017-03-07 PROCEDURE — 74011250637 HC RX REV CODE- 250/637: Performed by: PHYSICIAN ASSISTANT

## 2017-03-07 PROCEDURE — 77030012890

## 2017-03-07 PROCEDURE — 84703 CHORIONIC GONADOTROPIN ASSAY: CPT | Performed by: NURSE PRACTITIONER

## 2017-03-07 PROCEDURE — 76210000016 HC OR PH I REC 1 TO 1.5 HR: Performed by: ORTHOPAEDIC SURGERY

## 2017-03-07 PROCEDURE — 74011000258 HC RX REV CODE- 258: Performed by: PHYSICIAN ASSISTANT

## 2017-03-07 PROCEDURE — 77030003666 HC NDL SPINAL BD -A: Performed by: ORTHOPAEDIC SURGERY

## 2017-03-07 PROCEDURE — 77030020061 HC IV BLD WRMR ADMIN SET 3M -B: Performed by: ANESTHESIOLOGY

## 2017-03-07 PROCEDURE — 80048 BASIC METABOLIC PNL TOTAL CA: CPT | Performed by: PHYSICIAN ASSISTANT

## 2017-03-07 PROCEDURE — 74011000250 HC RX REV CODE- 250: Performed by: ORTHOPAEDIC SURGERY

## 2017-03-07 PROCEDURE — 0SB40ZZ EXCISION OF LUMBOSACRAL DISC, OPEN APPROACH: ICD-10-PCS | Performed by: ORTHOPAEDIC SURGERY

## 2017-03-07 PROCEDURE — 76010000162 HC OR TIME 1.5 TO 2 HR INTENSV-TIER 1: Performed by: ORTHOPAEDIC SURGERY

## 2017-03-07 PROCEDURE — 77030004391 HC BUR FLUT MEDT -C: Performed by: ORTHOPAEDIC SURGERY

## 2017-03-07 PROCEDURE — 74011250637 HC RX REV CODE- 250/637: Performed by: ORTHOPAEDIC SURGERY

## 2017-03-07 RX ORDER — OXYCODONE AND ACETAMINOPHEN 10; 325 MG/1; MG/1
1 TABLET ORAL
Status: DISCONTINUED | OUTPATIENT
Start: 2017-03-07 | End: 2017-03-10 | Stop reason: HOSPADM

## 2017-03-07 RX ORDER — BUPIVACAINE HYDROCHLORIDE AND EPINEPHRINE 5; 5 MG/ML; UG/ML
INJECTION, SOLUTION EPIDURAL; INTRACAUDAL; PERINEURAL AS NEEDED
Status: DISCONTINUED | OUTPATIENT
Start: 2017-03-07 | End: 2017-03-07 | Stop reason: HOSPADM

## 2017-03-07 RX ORDER — DIAZEPAM 5 MG/1
5 TABLET ORAL
Status: DISCONTINUED | OUTPATIENT
Start: 2017-03-07 | End: 2017-03-10 | Stop reason: HOSPADM

## 2017-03-07 RX ORDER — HYDROMORPHONE HCL IN 0.9% NACL 15 MG/30ML
PATIENT CONTROLLED ANALGESIA VIAL INTRAVENOUS
Status: DISPENSED | OUTPATIENT
Start: 2017-03-07 | End: 2017-03-08

## 2017-03-07 RX ORDER — SODIUM CHLORIDE 0.9 % (FLUSH) 0.9 %
5-10 SYRINGE (ML) INJECTION AS NEEDED
Status: DISCONTINUED | OUTPATIENT
Start: 2017-03-07 | End: 2017-03-10 | Stop reason: HOSPADM

## 2017-03-07 RX ORDER — PROPOFOL 10 MG/ML
INJECTION, EMULSION INTRAVENOUS AS NEEDED
Status: DISCONTINUED | OUTPATIENT
Start: 2017-03-07 | End: 2017-03-07 | Stop reason: HOSPADM

## 2017-03-07 RX ORDER — HYDROMORPHONE HYDROCHLORIDE 2 MG/ML
INJECTION, SOLUTION INTRAMUSCULAR; INTRAVENOUS; SUBCUTANEOUS AS NEEDED
Status: DISCONTINUED | OUTPATIENT
Start: 2017-03-07 | End: 2017-03-07 | Stop reason: HOSPADM

## 2017-03-07 RX ORDER — NEOSTIGMINE METHYLSULFATE 1 MG/ML
INJECTION INTRAVENOUS AS NEEDED
Status: DISCONTINUED | OUTPATIENT
Start: 2017-03-07 | End: 2017-03-07 | Stop reason: HOSPADM

## 2017-03-07 RX ORDER — ONDANSETRON 2 MG/ML
INJECTION INTRAMUSCULAR; INTRAVENOUS AS NEEDED
Status: DISCONTINUED | OUTPATIENT
Start: 2017-03-07 | End: 2017-03-07 | Stop reason: HOSPADM

## 2017-03-07 RX ORDER — CEFEPIME HYDROCHLORIDE 1 G/1
1 INJECTION, POWDER, FOR SOLUTION INTRAMUSCULAR; INTRAVENOUS
Status: COMPLETED | OUTPATIENT
Start: 2017-03-07 | End: 2017-03-07

## 2017-03-07 RX ORDER — VANCOMYCIN HYDROCHLORIDE 1 G/20ML
INJECTION, POWDER, LYOPHILIZED, FOR SOLUTION INTRAVENOUS AS NEEDED
Status: DISCONTINUED | OUTPATIENT
Start: 2017-03-07 | End: 2017-03-07 | Stop reason: HOSPADM

## 2017-03-07 RX ORDER — DIPHENHYDRAMINE HCL 25 MG
25 CAPSULE ORAL
Status: DISCONTINUED | OUTPATIENT
Start: 2017-03-07 | End: 2017-03-10 | Stop reason: HOSPADM

## 2017-03-07 RX ORDER — LIDOCAINE HYDROCHLORIDE 20 MG/ML
INJECTION, SOLUTION EPIDURAL; INFILTRATION; INTRACAUDAL; PERINEURAL AS NEEDED
Status: DISCONTINUED | OUTPATIENT
Start: 2017-03-07 | End: 2017-03-07 | Stop reason: HOSPADM

## 2017-03-07 RX ORDER — NALOXONE HYDROCHLORIDE 0.4 MG/ML
0.4 INJECTION, SOLUTION INTRAMUSCULAR; INTRAVENOUS; SUBCUTANEOUS AS NEEDED
Status: DISCONTINUED | OUTPATIENT
Start: 2017-03-07 | End: 2017-03-10 | Stop reason: HOSPADM

## 2017-03-07 RX ORDER — ROCURONIUM BROMIDE 10 MG/ML
INJECTION, SOLUTION INTRAVENOUS AS NEEDED
Status: DISCONTINUED | OUTPATIENT
Start: 2017-03-07 | End: 2017-03-07 | Stop reason: HOSPADM

## 2017-03-07 RX ORDER — ACETAMINOPHEN 325 MG/1
650 TABLET ORAL
Status: DISCONTINUED | OUTPATIENT
Start: 2017-03-07 | End: 2017-03-10 | Stop reason: HOSPADM

## 2017-03-07 RX ORDER — ZOLPIDEM TARTRATE 5 MG/1
5 TABLET ORAL
Status: DISCONTINUED | OUTPATIENT
Start: 2017-03-07 | End: 2017-03-10 | Stop reason: HOSPADM

## 2017-03-07 RX ORDER — ONDANSETRON 2 MG/ML
4 INJECTION INTRAMUSCULAR; INTRAVENOUS
Status: DISCONTINUED | OUTPATIENT
Start: 2017-03-07 | End: 2017-03-10 | Stop reason: HOSPADM

## 2017-03-07 RX ORDER — FAMOTIDINE 20 MG/1
20 TABLET, FILM COATED ORAL EVERY 12 HOURS
Status: DISCONTINUED | OUTPATIENT
Start: 2017-03-07 | End: 2017-03-10 | Stop reason: HOSPADM

## 2017-03-07 RX ORDER — FAMOTIDINE 20 MG/1
20 TABLET, FILM COATED ORAL EVERY 12 HOURS
Status: DISCONTINUED | OUTPATIENT
Start: 2017-03-07 | End: 2017-03-07

## 2017-03-07 RX ORDER — FENTANYL CITRATE 50 UG/ML
INJECTION, SOLUTION INTRAMUSCULAR; INTRAVENOUS AS NEEDED
Status: DISCONTINUED | OUTPATIENT
Start: 2017-03-07 | End: 2017-03-07 | Stop reason: HOSPADM

## 2017-03-07 RX ORDER — GLYCOPYRROLATE 0.2 MG/ML
INJECTION INTRAMUSCULAR; INTRAVENOUS AS NEEDED
Status: DISCONTINUED | OUTPATIENT
Start: 2017-03-07 | End: 2017-03-07 | Stop reason: HOSPADM

## 2017-03-07 RX ORDER — HYDROMORPHONE HYDROCHLORIDE 1 MG/ML
1 INJECTION, SOLUTION INTRAMUSCULAR; INTRAVENOUS; SUBCUTANEOUS
Status: DISCONTINUED | OUTPATIENT
Start: 2017-03-07 | End: 2017-03-10 | Stop reason: HOSPADM

## 2017-03-07 RX ORDER — SUCCINYLCHOLINE CHLORIDE 20 MG/ML
INJECTION INTRAMUSCULAR; INTRAVENOUS AS NEEDED
Status: DISCONTINUED | OUTPATIENT
Start: 2017-03-07 | End: 2017-03-07 | Stop reason: HOSPADM

## 2017-03-07 RX ORDER — DEXAMETHASONE SODIUM PHOSPHATE 4 MG/ML
INJECTION, SOLUTION INTRA-ARTICULAR; INTRALESIONAL; INTRAMUSCULAR; INTRAVENOUS; SOFT TISSUE AS NEEDED
Status: DISCONTINUED | OUTPATIENT
Start: 2017-03-07 | End: 2017-03-07 | Stop reason: HOSPADM

## 2017-03-07 RX ORDER — SODIUM CHLORIDE 9 MG/ML
125 INJECTION, SOLUTION INTRAVENOUS CONTINUOUS
Status: DISCONTINUED | OUTPATIENT
Start: 2017-03-07 | End: 2017-03-10

## 2017-03-07 RX ORDER — DOCUSATE SODIUM 100 MG/1
100 CAPSULE, LIQUID FILLED ORAL 2 TIMES DAILY
Status: DISCONTINUED | OUTPATIENT
Start: 2017-03-07 | End: 2017-03-10 | Stop reason: HOSPADM

## 2017-03-07 RX ORDER — MIDAZOLAM HYDROCHLORIDE 1 MG/ML
INJECTION, SOLUTION INTRAMUSCULAR; INTRAVENOUS AS NEEDED
Status: DISCONTINUED | OUTPATIENT
Start: 2017-03-07 | End: 2017-03-07 | Stop reason: HOSPADM

## 2017-03-07 RX ORDER — SODIUM CHLORIDE 0.9 % (FLUSH) 0.9 %
5-10 SYRINGE (ML) INJECTION EVERY 8 HOURS
Status: DISCONTINUED | OUTPATIENT
Start: 2017-03-07 | End: 2017-03-10 | Stop reason: HOSPADM

## 2017-03-07 RX ORDER — SODIUM CHLORIDE, SODIUM LACTATE, POTASSIUM CHLORIDE, CALCIUM CHLORIDE 600; 310; 30; 20 MG/100ML; MG/100ML; MG/100ML; MG/100ML
INJECTION, SOLUTION INTRAVENOUS
Status: DISCONTINUED | OUTPATIENT
Start: 2017-03-07 | End: 2017-03-07 | Stop reason: HOSPADM

## 2017-03-07 RX ADMIN — GLYCOPYRROLATE 0.3 MG: 0.2 INJECTION INTRAMUSCULAR; INTRAVENOUS at 11:40

## 2017-03-07 RX ADMIN — HYDROMORPHONE HYDROCHLORIDE 0.5 MG: 1 INJECTION, SOLUTION INTRAMUSCULAR; INTRAVENOUS; SUBCUTANEOUS at 09:39

## 2017-03-07 RX ADMIN — DEXAMETHASONE SODIUM PHOSPHATE 8 MG: 4 INJECTION, SOLUTION INTRA-ARTICULAR; INTRALESIONAL; INTRAMUSCULAR; INTRAVENOUS; SOFT TISSUE at 11:22

## 2017-03-07 RX ADMIN — DIAZEPAM 5 MG: 5 TABLET ORAL at 02:20

## 2017-03-07 RX ADMIN — HYDROMORPHONE HYDROCHLORIDE 1 MG: 1 INJECTION, SOLUTION INTRAMUSCULAR; INTRAVENOUS; SUBCUTANEOUS at 09:04

## 2017-03-07 RX ADMIN — Medication 10 ML: at 08:37

## 2017-03-07 RX ADMIN — SODIUM CHLORIDE, SODIUM LACTATE, POTASSIUM CHLORIDE, CALCIUM CHLORIDE: 600; 310; 30; 20 INJECTION, SOLUTION INTRAVENOUS at 11:47

## 2017-03-07 RX ADMIN — MIDAZOLAM HYDROCHLORIDE 2 MG: 1 INJECTION, SOLUTION INTRAMUSCULAR; INTRAVENOUS at 10:30

## 2017-03-07 RX ADMIN — HYDROMORPHONE HYDROCHLORIDE 1 MG: 1 INJECTION, SOLUTION INTRAMUSCULAR; INTRAVENOUS; SUBCUTANEOUS at 01:43

## 2017-03-07 RX ADMIN — HYDROMORPHONE HYDROCHLORIDE 2 MG: 2 INJECTION, SOLUTION INTRAMUSCULAR; INTRAVENOUS; SUBCUTANEOUS at 11:40

## 2017-03-07 RX ADMIN — SODIUM CHLORIDE 125 ML/HR: 900 INJECTION, SOLUTION INTRAVENOUS at 20:03

## 2017-03-07 RX ADMIN — ROCURONIUM BROMIDE 25 MG: 10 INJECTION, SOLUTION INTRAVENOUS at 10:52

## 2017-03-07 RX ADMIN — VANCOMYCIN HYDROCHLORIDE 750 MG: 10 INJECTION, POWDER, LYOPHILIZED, FOR SOLUTION INTRAVENOUS at 23:00

## 2017-03-07 RX ADMIN — MIDAZOLAM HYDROCHLORIDE 3 MG: 1 INJECTION, SOLUTION INTRAMUSCULAR; INTRAVENOUS at 12:03

## 2017-03-07 RX ADMIN — ONDANSETRON 4 MG: 2 INJECTION INTRAMUSCULAR; INTRAVENOUS at 11:39

## 2017-03-07 RX ADMIN — HYDROMORPHONE HYDROCHLORIDE 1 MG: 1 INJECTION, SOLUTION INTRAMUSCULAR; INTRAVENOUS; SUBCUTANEOUS at 06:30

## 2017-03-07 RX ADMIN — ROCURONIUM BROMIDE 10 MG: 10 INJECTION, SOLUTION INTRAVENOUS at 10:56

## 2017-03-07 RX ADMIN — FENTANYL CITRATE 50 MCG: 50 INJECTION, SOLUTION INTRAMUSCULAR; INTRAVENOUS at 12:05

## 2017-03-07 RX ADMIN — PROPOFOL 150 MG: 10 INJECTION, EMULSION INTRAVENOUS at 10:37

## 2017-03-07 RX ADMIN — Medication: at 15:00

## 2017-03-07 RX ADMIN — CEFEPIME 1 G: 1 INJECTION, POWDER, FOR SOLUTION INTRAMUSCULAR; INTRAVENOUS at 11:14

## 2017-03-07 RX ADMIN — ROCURONIUM BROMIDE 5 MG: 10 INJECTION, SOLUTION INTRAVENOUS at 10:37

## 2017-03-07 RX ADMIN — HYDROMORPHONE HYDROCHLORIDE 4 MG: 2 TABLET ORAL at 03:40

## 2017-03-07 RX ADMIN — SODIUM CHLORIDE, SODIUM LACTATE, POTASSIUM CHLORIDE, CALCIUM CHLORIDE: 600; 310; 30; 20 INJECTION, SOLUTION INTRAVENOUS at 10:23

## 2017-03-07 RX ADMIN — SODIUM CHLORIDE 100 ML/HR: 900 INJECTION, SOLUTION INTRAVENOUS at 12:50

## 2017-03-07 RX ADMIN — NEOSTIGMINE METHYLSULFATE 3 MG: 1 INJECTION INTRAVENOUS at 11:40

## 2017-03-07 RX ADMIN — DIAZEPAM 5 MG: 5 TABLET ORAL at 09:29

## 2017-03-07 RX ADMIN — DOCUSATE SODIUM 100 MG: 100 CAPSULE ORAL at 18:38

## 2017-03-07 RX ADMIN — CEFEPIME 2 G: 2 INJECTION, POWDER, FOR SOLUTION INTRAVENOUS at 19:59

## 2017-03-07 RX ADMIN — FENTANYL CITRATE 200 MCG: 50 INJECTION, SOLUTION INTRAMUSCULAR; INTRAVENOUS at 10:37

## 2017-03-07 RX ADMIN — Medication 10 ML: at 07:27

## 2017-03-07 RX ADMIN — FENTANYL CITRATE 50 MCG: 50 INJECTION, SOLUTION INTRAMUSCULAR; INTRAVENOUS at 12:11

## 2017-03-07 RX ADMIN — SUCCINYLCHOLINE CHLORIDE 100 MG: 20 INJECTION INTRAMUSCULAR; INTRAVENOUS at 10:37

## 2017-03-07 RX ADMIN — HYDROMORPHONE HYDROCHLORIDE 4 MG: 2 TABLET ORAL at 07:27

## 2017-03-07 RX ADMIN — FENTANYL CITRATE 50 MCG: 50 INJECTION, SOLUTION INTRAMUSCULAR; INTRAVENOUS at 10:30

## 2017-03-07 RX ADMIN — DIAZEPAM 5 MG: 5 TABLET ORAL at 20:00

## 2017-03-07 RX ADMIN — LIDOCAINE HYDROCHLORIDE 40 MG: 20 INJECTION, SOLUTION EPIDURAL; INFILTRATION; INTRACAUDAL; PERINEURAL at 10:37

## 2017-03-07 RX ADMIN — FAMOTIDINE 20 MG: 20 TABLET, FILM COATED ORAL at 18:38

## 2017-03-07 NOTE — PROGRESS NOTES
0900 Per Dr Sharyle Thompson \"add incision and debridement\" to consent form. 0930: \"Ok to give oral valium and another half mg of dilaudid. \" per Dr Yamini Mojica (anesthesia).

## 2017-03-07 NOTE — ANESTHESIA PREPROCEDURE EVALUATION
Anesthetic History   No history of anesthetic complications            Review of Systems / Medical History  Patient summary reviewed and nursing notes reviewed    Pulmonary  Within defined limits                 Neuro/Psych             Comments: PAIN = 5/10   Right posterior to ankle Cardiovascular  Within defined limits                Exercise tolerance: >4 METS     GI/Hepatic/Renal  Within defined limits              Endo/Other  Within defined limits           Other Findings              Physical Exam    Airway  Mallampati: II  TM Distance: 4 - 6 cm  Neck ROM: normal range of motion   Mouth opening: Normal     Cardiovascular    Rhythm: regular  Rate: normal         Dental    Dentition: Lower dentition intact and Upper dentition intact     Pulmonary  Breath sounds clear to auscultation               Abdominal         Other Findings            Anesthetic Plan    ASA: 1  Anesthesia type: general          Induction: Intravenous  Anesthetic plan and risks discussed with: Patient

## 2017-03-07 NOTE — BRIEF OP NOTE
BRIEF OPERATIVE NOTE    Date of Procedure: 3/7/2017   Preoperative Diagnosis: Disc Herniation  Postoperative Diagnosis: Disc Herniation    Procedure(s):  RIGHT SIDE L5-S1 SPINE LUMBAR LAMINECTOMY MICRODISCECTOMY/REVISION  Surgeon(s) and Role:     * Pete Adame MD - Primary       Physician Assistant: RUPNIDER Lewis    Surgical Staff:  Circ-1: Ten Cao RN  Circ-Intern: Clarisse Brar RN  Physician Assistant: RUPINDER Lewis  Scrub Tech-1: Bora Hollis; Nasim Beth  Surg Asst-1: Roc Orville Wiggins  Event Time In   Incision Start 1113   Incision Close      Anesthesia: General   Estimated Blood Loss: 10  Specimens:   ID Type Source Tests Collected by Time Destination   1 : Deep Spine Wound Wound Incision CULTURE, ANAEROBIC, CULTURE, WOUND W [employer] STAIN, GRAM STAIN, CULTURE, FUNGUS Pete Adame MD 3/7/2017 1126 Microbiology      Findings: cloudy serous fluid   Complications: none  Implants:   Implant Name Type Inv.  Item Serial No.  Lot No. LRB No. Used Action   GRAFT DURA REGEN TEMPLT 2X2IN -- DURAGEN - SNA   GRAFT DURA REGEN TEMPLT 2X2IN -- [employer]URAGEN NA [employer] [place] [id number] Right 1 Implanted

## 2017-03-07 NOTE — ANESTHESIA POSTPROCEDURE EVALUATION
Post-Anesthesia Evaluation and Assessment    Patient: Jolynn Coreas MRN: [id number]  SSN: [id number]    YOB: 1985  Age: 33 y.o. Sex: female       Cardiovascular Function/Vital Signs  Visit Vitals    /72    Pulse 90    Temp (!) 38.5 °C (101.3 °F)    Resp 11    Ht 5' 3\" (1.6 m)    Wt 49.4 kg (109 lb)    SpO2 100%    BMI 19.31 kg/m2       Patient is status post general anesthesia for Procedure(s):  RIGHT SIDE L5-S1 SPINE LUMBAR LAMINECTOMY MICRODISCECTOMY/REVISION. Nausea/Vomiting: None    Postoperative hydration reviewed and adequate. Pain:  Pain Scale 1: FLACC (03/07/17 1234)  Pain Intensity 1: 2 (03/07/17 1234)   Managed    Neurological Status:   Neuro (WDL): Exceptions to WDL (03/07/17 1234)  Neuro  Neurologic State: Confused; Eyes open spontaneously; Pharmacologically induced (comment) (03/07/17 1234)  Orientation Level: Oriented to person (03/07/17 1234)  Cognition: Impaired decision making;Decreased command following (03/07/17 1234)  LUE Motor Response: Purposeful (03/07/17 1234)  LLE Motor Response: Purposeful (03/07/17 1234)  RUE Motor Response: Purposeful (03/07/17 1234)  RLE Motor Response: Purposeful (03/07/17 1234)   At baseline    Mental Status and Level of Consciousness: Arousable    Pulmonary Status:   O2 Device: Nasal cannula (03/07/17 1235)   Adequate oxygenation and airway patent    Complications related to anesthesia: None    Post-anesthesia assessment completed.  No concerns    Signed By: Stefano Benson MD     March 7, 2017

## 2017-03-07 NOTE — PROGRESS NOTES
ORTHOPAEDIC SPINE SURGERY PROGRESS NOTE    NAME:     Jennifer Cain   :       1985   MRN:       [id number]   DATE:      3/7/2017    SUBJECTIVE:    C/O back pain and severe muscle spasms  No radiating leg pain, LE weakness or numbness  Denies nausea/vomiting, chest pain, headache or shortness of breath    Preliminary cultures from aspiration showed no organisms on gram stain, 1+ WBCs seen    Recent Labs      17   2038   HGB  12.7   HCT  38.3   NA  134*   K  4.1   CL  98   CO2  27   BUN  8   CREA  0.78   GLU  140*     No data found. EXAM:  Incision is unremarkable. No swelling, erythema or increased warmth. No drainage   Positive strength/ROM bilat lower ext.   Neuro intact to sensation  Calves, soft & nontender  BL LEs NVID      PLAN:  Labs pending  Will go the the OR today for a  RIGHT SIDE L5-S1 SPINE LUMBAR LAMINECTOMY MICRODISCECTOMY/REVISION  PT will fit pt for a LSO brace  Pt to remain NPO at this time      Jhonny Adhikari, [address]  Orthopaedic Surgery  Physician Assistant to Dr. Lester Currie

## 2017-03-07 NOTE — PROGRESS NOTES
Met with pt's , Airam Poole, and sister for d/c planning. Pt drowsy at bedside. Confirmed pt's demographic information. She lives in a one story house with her  and sister. Pt reportedly does not have any DME at home. She has Rx coverage. Pharmacy is [facility] on [address]. PCP is Dr. Arlina Burkitt. HH order received and discussed with pt's . Choice offered and pt's  selected [facility] Referral sent in [facility]. Martin Deleon LCSW    Care Management Interventions  PCP Verified by CM:  Yes (Dr. Renae Melendrez)  Transition of Care Consult (CM Consult): [address]: Yes  Discharge Durable Medical Equipment: No  Physical Therapy Consult: Yes  Occupational Therapy Consult: Yes  Speech Therapy Consult: No  Current Support Network: Lives with Spouse  Confirm Follow Up Transport: Family  Plan discussed with Pt/Family/Caregiver: Yes  Freedom of Choice Offered: Yes  Discharge Location  Discharge Placement: Home with home health

## 2017-03-07 NOTE — PROGRESS NOTES
Letter of Status Determination:   Recommend hospitalization status upgraded from Observation to Inpatient  Status    Pt Name:  Jelly Menchaca   MR#  [id number]   [facility]#   [id number]   Room and Hospital  PH/PL  @ [facility]   Admit date  3/5/2017  8:15 PM   Current Attending Physician  Ilsa Bustamante MD   Principal diagnosis  <principal problem not specified>   \"Intractable Lower Back Pain;Disc Herniation \"   Clinicals  33 y.o. y.o  female hospitalized with above diagnosis   The pt had recent spine surgery and returns to hospital for severe pain. MRI shows   \"Hemilaminotomy defect on the right at L5-S1. No central canal stenosis is present. Mild broad-based disc protrusion extends to the right paracentral and right lateral recess region of L5-S1. Small disc extrusion at the right lateral recess of L5-S1. The right neural foramen is patent.  There is moderate effacement of nerve roots extending to the right S1 foramen\"    The pt has required significant amount of IV narcotics round the clock for severe pain;     HYDROmorphone (PF) (DILAUDID) injection 1 mg   [[id number]]   Tue Mar 7, 2017 0937 Dispense Agustín, Ads Dispense [facility] OR-PREPOST ADS      Tue Mar 7, 2017 0858 Dispense Agustín, Ads Dispense [facility] OR-PREPOST ADS     Tue Mar 7, 2017 0628 Dispense Agustín, Ads Dispense [facility] [id number] ADS     Tue Mar 7, 2017 0139 Dispense Agustín, Ads Dispense [facility] [id number] ADS     Mon Mar 6, 2017 1904 Dispense Agustín, Ads Dispense [facility] [id number] ADS     Mon Mar 6, 2017 1508 Dispense Agustín, Ads Dispense [facility] [id number] ADS     Mon Mar 6, 2017 1113 Dispense Agustín, Ads Dispense [facility] [id number] ADS     Mon Mar 6, 2017 0826 Dispense Agustín, Ads Dispense [facility] [id number] ADS     Mon Mar 6, 2017 0526 Dispense Agustín, Ads Dispense [facility] [id number] ADS     Mon Mar 6, 2017 0229 Dispense Agustín, Ads Dispense [facility] [id number] ADS     [place] Mar 5, 2017 2253 Dispense Agustín, Ads Dispense [facility] [id number] ADS     [place] Mar 5, 2017 2247 Order Transfer Agustín, Ads Dispense [facility] [id number] ADS     [place] Mar 5, 2017 2134 Verify Quirino Lema PA-C [facility] ED AUTOVERIFY          [facility]) criteria   Does  apply Back Pain ORG: M-[facility])    Inpatient admission required[A] rather than observation care    Severe pain requiring acute inpatient management as indicated by 1 or more of the following(1)(2)(3):  -Continuous or frequent (eg, every 2 to 4 hours) parenteral analgesics required[A]  -Necessity (ie, alternative approaches not effective) for analgesic regimen that can only be performed or initiated in inpatient setting     STATUS DETERMINATION  Based on documented presenting clinical data, comorbid conditions, high risk of adverse events and deterioration, it is our recommendation that the patient's status should be upgraded from OBSERVATION to INPATIENT status. The final decision of the patient's hospitalization status depends on the attending physician's judgment.          Additional comments     Payor: Jose Pate / Plan: [address] [place] [address] / Product Type: PPO /         Keith Grimes MD MPH FACP     Physician Advisor    [address]   President Medical Staff, [address] [place] [address]    Cell  [phone]

## 2017-03-07 NOTE — PROGRESS NOTES
[facility] Dosing Services: Antimicrobial Stewardship Progress Note  Consult for antibiotic dosing of Vancomycin/Cefepime by Sarah Hassan NP  Pharmacist reviewed antibiotic appropriateness for 33year old female for indication of Surgical site infection (lumbar surgery)  Day of Therapy: 1    Plan:  Vancomycin therapy:  Loading dose: Vancomycin 1000 mg given in preop  Maintenance dose: Vancomycin 750 mg IV q12hr  Dose calculated to approximate a therapeutic trough of 15-20 mcg/mL. Last trough level / Plan for level: after 3rd dose  Pharmacy to follow daily and will make changes to dose and/or frequency based on clinical status. Non-Kinetic Antimicrobial Dosing:   Current Regimen:  Cefepime 1 gm IV q8hr  Recommendation: Changed to Cefepime 2 gm IV q8hr    Other Antimicrobial  (not dosed by pharmacist)   none   Cultures     3/6/2017: Back wound: heavy GNR pending  Anaerobes: pending  Intraop cultures ordered   Serum Creatinine     Lab Results   Component Value Date/Time    Creatinine 0.85 03/07/2017 07:39 AM       Creatinine Clearance Estimated Creatinine Clearance: 74.8 mL/min (based on Cr of 0.85).      Temp   (!) 101.3 °F (38.5 °C)      WBC   Lab Results   Component Value Date/Time    WBC 6.9 03/07/2017 07:39 AM       H/H   Lab Results   Component Value Date/Time    HGB 12.3 03/07/2017 07:39 AM        Platelets   Lab Results   Component Value Date/Time    PLATELET 908 58/18/2997 07:39 AM        Pharmacist: Signed Beatriz Ball Contact information: [phone]

## 2017-03-07 NOTE — PROGRESS NOTES
Bedside shift change report given to Keila Chambers RN (oncoming nurse) by Daniel Liriano RN (offgoing nurse). Report included the following information SBAR, Kardex, Procedure Summary, Intake/Output, MAR and Recent Results.

## 2017-03-07 NOTE — PROGRESS NOTES
TRANSFER - OUT REPORT:    Verbal report given to Bal Ashford RN on Mabeline Patient  being transferred to 4th Floor for routine progression of care       Report consisted of patients Situation, Background, Assessment and   Recommendations(SBAR). Information from the following report(s) SBAR, Kardex, OR Summary, Intake/Output and MAR was reviewed with the receiving nurse. Lines:   Peripheral IV 03/06/17 Right Forearm (Active)   Site Assessment Clean, dry, & intact 3/7/2017  1:43 PM   Phlebitis Assessment 0 3/7/2017  1:43 PM   Infiltration Assessment 0 3/7/2017  1:43 PM   Dressing Status Clean, dry, & intact; Occlusive 3/7/2017  1:43 PM   Dressing Type Tape;Transparent 3/7/2017  1:43 PM   Hub Color/Line Status Pink; Infusing 3/7/2017  1:43 PM   Alcohol Cap Used Yes 3/6/2017 11:50 PM        Opportunity for questions and clarification was provided.       Patient transported with:   O2 @ 2 liters  Registered Nurse

## 2017-03-07 NOTE — H&P
Date of Surgery Update:  Anika Wisdom was seen and examined. History and physical has been reviewed. The patient has been examined.  There have been no significant clinical changes since the completion of the originally dated History and Physical.    Signed By: Lee Ann Gonsalez MD     March 7, 2017 8:56 AM

## 2017-03-07 NOTE — ROUTINE PROCESS
Bedside and Verbal shift change report given to Dougie Gentile (oncoming nurse) by Gustabo Mauro RN (offgoing nurse). Report included the following information SBAR, Kardex, Procedure Summary, Intake/Output, MAR, Accordion, Recent Results and Med Rec Status.

## 2017-03-07 NOTE — PROGRESS NOTES
12:35 PM  Spoke w/ Dr. Karen Chavarria, Infectious Disease. Discussed preliminary culture results. He agrees w/ initial treatment plan of Vancomycin and Cefepime. OR deep spine cultures currently pending. INR pending. Will have PICC line placed tomorrow as d/w Dr. Karen Chavarria. Dr. Karen Chavarria will see the patient tomorrow.      Justa Wilson, [place]  Orthopaedic Surgery  Physician Assistant to Dr. Hans Nelson

## 2017-03-07 NOTE — H&P
Date of Surgery Update:  Nel Parekh was seen and examined. History and physical has been reviewed. Significant clinical changes have occurred as noted:  Esr and crp elevated. New mri relatively benign except for new disc protrusion. We aspirated the fluid - gm stain unremarkable, cxs pending. Having severe axial lbp.  Consequently will proceed with revision diskectomy, operative cxs    Signed By: Andrew Schaefer MD     March 7, 2017 8:56 AM

## 2017-03-07 NOTE — PROGRESS NOTES
PHYSICAL THERAPY    840 Chart reviewed. Pt currently off floor for a  RIGHT SIDE L5-S1 SPINE LUMBAR LAMINECTOMY MICRODISCECTOMY/REVISION. PT will follow up later this afternooon. Jo Ann Santana

## 2017-03-08 ENCOUNTER — HOME HEALTH ADMISSION (OUTPATIENT)
Dept: HOME HEALTH SERVICES | Facility: HOME HEALTH | Age: 32
End: 2017-03-08
Payer: COMMERCIAL

## 2017-03-08 LAB
ANION GAP BLD CALC-SCNC: 8 MMOL/L (ref 5–15)
BACTERIA SPEC CULT: ABNORMAL
BACTERIA SPEC CULT: NORMAL
BUN SERPL-MCNC: 6 MG/DL (ref 6–20)
BUN/CREAT SERPL: 8 (ref 12–20)
CALCIUM SERPL-MCNC: 8.2 MG/DL (ref 8.5–10.1)
CHLORIDE SERPL-SCNC: 101 MMOL/L (ref 97–108)
CO2 SERPL-SCNC: 28 MMOL/L (ref 21–32)
CREAT SERPL-MCNC: 0.79 MG/DL (ref 0.55–1.02)
ERYTHROCYTE [DISTWIDTH] IN BLOOD BY AUTOMATED COUNT: 13.1 % (ref 11.5–14.5)
GLUCOSE SERPL-MCNC: 89 MG/DL (ref 65–100)
GRAM STN SPEC: ABNORMAL
GRAM STN SPEC: ABNORMAL
HCT VFR BLD AUTO: 37.8 % (ref 35–47)
HGB BLD-MCNC: 11.9 G/DL (ref 11.5–16)
MCH RBC QN AUTO: 30.7 PG (ref 26–34)
MCHC RBC AUTO-ENTMCNC: 31.5 G/DL (ref 30–36.5)
MCV RBC AUTO: 97.4 FL (ref 80–99)
PLATELET # BLD AUTO: 127 K/UL (ref 150–400)
POTASSIUM SERPL-SCNC: 3.5 MMOL/L (ref 3.5–5.1)
RBC # BLD AUTO: 3.88 M/UL (ref 3.8–5.2)
SERVICE CMNT-IMP: ABNORMAL
SERVICE CMNT-IMP: NORMAL
SODIUM SERPL-SCNC: 137 MMOL/L (ref 136–145)
WBC # BLD AUTO: 6.2 K/UL (ref 3.6–11)

## 2017-03-08 PROCEDURE — 97116 GAIT TRAINING THERAPY: CPT

## 2017-03-08 PROCEDURE — 80048 BASIC METABOLIC PNL TOTAL CA: CPT | Performed by: ORTHOPAEDIC SURGERY

## 2017-03-08 PROCEDURE — 74011250636 HC RX REV CODE- 250/636: Performed by: ORTHOPAEDIC SURGERY

## 2017-03-08 PROCEDURE — 65270000029 HC RM PRIVATE

## 2017-03-08 PROCEDURE — 74011000258 HC RX REV CODE- 258: Performed by: PHYSICIAN ASSISTANT

## 2017-03-08 PROCEDURE — 36415 COLL VENOUS BLD VENIPUNCTURE: CPT | Performed by: ORTHOPAEDIC SURGERY

## 2017-03-08 PROCEDURE — 77010033678 HC OXYGEN DAILY

## 2017-03-08 PROCEDURE — 97535 SELF CARE MNGMENT TRAINING: CPT | Performed by: OCCUPATIONAL THERAPIST

## 2017-03-08 PROCEDURE — 74011000258 HC RX REV CODE- 258: Performed by: INTERNAL MEDICINE

## 2017-03-08 PROCEDURE — 74011250636 HC RX REV CODE- 250/636: Performed by: INTERNAL MEDICINE

## 2017-03-08 PROCEDURE — 74011250637 HC RX REV CODE- 250/637: Performed by: ORTHOPAEDIC SURGERY

## 2017-03-08 PROCEDURE — 97530 THERAPEUTIC ACTIVITIES: CPT

## 2017-03-08 PROCEDURE — 85027 COMPLETE CBC AUTOMATED: CPT | Performed by: ORTHOPAEDIC SURGERY

## 2017-03-08 PROCEDURE — 74011250636 HC RX REV CODE- 250/636: Performed by: PHYSICIAN ASSISTANT

## 2017-03-08 PROCEDURE — 97168 OT RE-EVAL EST PLAN CARE: CPT | Performed by: OCCUPATIONAL THERAPIST

## 2017-03-08 PROCEDURE — 97164 PT RE-EVAL EST PLAN CARE: CPT

## 2017-03-08 RX ORDER — CYCLOBENZAPRINE HCL 10 MG
10 TABLET ORAL
Status: DISCONTINUED | OUTPATIENT
Start: 2017-03-08 | End: 2017-03-10 | Stop reason: HOSPADM

## 2017-03-08 RX ORDER — HYDROMORPHONE HCL IN 0.9% NACL 15 MG/30ML
PATIENT CONTROLLED ANALGESIA VIAL INTRAVENOUS
Status: ACTIVE | OUTPATIENT
Start: 2017-03-08 | End: 2017-03-09

## 2017-03-08 RX ADMIN — SODIUM CHLORIDE 125 ML/HR: 900 INJECTION, SOLUTION INTRAVENOUS at 23:44

## 2017-03-08 RX ADMIN — Medication 10 ML: at 06:46

## 2017-03-08 RX ADMIN — CYCLOBENZAPRINE HYDROCHLORIDE 10 MG: 10 TABLET, FILM COATED ORAL at 08:30

## 2017-03-08 RX ADMIN — DOCUSATE SODIUM 100 MG: 100 CAPSULE ORAL at 08:30

## 2017-03-08 RX ADMIN — Medication: at 11:46

## 2017-03-08 RX ADMIN — DIAZEPAM 5 MG: 5 TABLET ORAL at 14:05

## 2017-03-08 RX ADMIN — Medication: at 16:00

## 2017-03-08 RX ADMIN — MEROPENEM 500 MG: 500 INJECTION, POWDER, FOR SOLUTION INTRAVENOUS at 18:23

## 2017-03-08 RX ADMIN — CEFEPIME 2 G: 2 INJECTION, POWDER, FOR SOLUTION INTRAVENOUS at 03:06

## 2017-03-08 RX ADMIN — CYCLOBENZAPRINE HYDROCHLORIDE 10 MG: 10 TABLET, FILM COATED ORAL at 16:14

## 2017-03-08 RX ADMIN — SODIUM CHLORIDE 125 ML/HR: 900 INJECTION, SOLUTION INTRAVENOUS at 06:37

## 2017-03-08 RX ADMIN — Medication: at 15:26

## 2017-03-08 RX ADMIN — Medication 10 ML: at 13:16

## 2017-03-08 RX ADMIN — FAMOTIDINE 20 MG: 20 TABLET, FILM COATED ORAL at 08:30

## 2017-03-08 RX ADMIN — CYCLOBENZAPRINE HYDROCHLORIDE 10 MG: 10 TABLET, FILM COATED ORAL at 23:46

## 2017-03-08 RX ADMIN — CEFEPIME 2 G: 2 INJECTION, POWDER, FOR SOLUTION INTRAVENOUS at 13:16

## 2017-03-08 RX ADMIN — DIAZEPAM 5 MG: 5 TABLET ORAL at 03:06

## 2017-03-08 RX ADMIN — VANCOMYCIN HYDROCHLORIDE 750 MG: 10 INJECTION, POWDER, LYOPHILIZED, FOR SOLUTION INTRAVENOUS at 11:49

## 2017-03-08 RX ADMIN — Medication 10 ML: at 00:28

## 2017-03-08 NOTE — PROGRESS NOTES
Occupational Therapy Re-EVALUATION/discharge  Patient: Lashonda Guzman (51 y.o. female)  Date: 3/8/2017  Primary Diagnosis: Intractable Lower Back Pain  Disc Herniation  Lumbar disc herniation  Procedure(s) (LRB):  RIGHT SIDE L5-S1 SPINE LUMBAR LAMINECTOMY MICRODISCECTOMY/REVISION (Right) 1 Day Post-Op   Precautions:  Fall, Back (LSO brace when OOB)    ASSESSMENT AND RECOMMENDATIONS:  Pt POD 1 s/p R side L5-S1 lumbar laminectomy microdiscectomy/revision. Based on the objective data described below, the patient presents with improved pain since initial OT eval on 3/6/2017. She remains limited by decreased mobility and is at an overall min A level with LE ADLs as she cannot quite cross her legs to reach her feet, SBA for toileting and SBA for functional mobility in room and bathroom. Pt was able to verbalize all back precautions from memory and with good safety awareness. She has good family support at home who can assist as needed. Skilled acute occupational therapy is no longer indicated at this time. Discharge Recommendations: None for OT  Further Equipment Recommendations for Discharge: TBD      SUBJECTIVE:   Patient stated I feel better now.     OBJECTIVE DATA SUMMARY:   Hospital course since last seen and reason for reevaluation: POD 1 s/p R side L5-S1 lumbar laminectomy microdiscectomy/revision  Cognitive/Behavioral Status:  Neurologic State: Drowsy  Orientation Level: Oriented X4  Cognition: Decreased attention/concentration; Follows commands; Appropriate safety awareness  Perception: Appears intact  Perseveration: No perseveration noted  Safety/Judgement: Awareness of environment; Fall prevention;Good awareness of safety precautions; Home safety; Insight into deficits    Vision/Perceptual:    Acuity: Within Defined Limits         Range of Motion:  AROM: Generally decreased, functional (slow and cautious ROM )  PROM: Within functional limits                    Strength:  Strength:  Within functional limits Coordination:  Coordination: Within functional limits  Fine Motor Skills-Upper: Left Intact; Right Intact    Gross Motor Skills-Upper: Left Intact; Right Intact  Tone & Sensation:  Tone: Normal  Sensation: Intact                      Balance:  Sitting: Intact  Standing: Intact    Functional Mobility and Transfers for ADLs:  Bed Mobility:  Rolling: Stand-by asssistance; Additional time (log roll technique)  Supine to Sit: Stand-by asssistance; Additional time;Assist x1  Scooting: Stand-by asssistance; Additional time    Transfers:  Sit to Stand: Stand-by asssistance; Additional time  Toilet Transfer : Stand-by asssistance; Additional time    ADL Assessment:  Feeding: Independent    Oral Facial Hygiene/Grooming: Stand-by assistance (standing at sink)    Bathing: Minimum assistance; Additional time;Assist x1 (A to cross legs to reach feet following back precautions)    Upper Body Dressing: Stand-by assistance; Additional time (including LSO brace)    Lower Body Dressing: Minimum assistance; Additional time (A to cross legs to reach feet following back precautions)    Toileting: Stand by assistance (for safety with standing)              ADL Intervention:  Bathing: Patient instructed and indicated understanding when bathing to not submerge wound in water, stand to shower or sponge bathe, cover wound with plastic and tape to ensure no water reaches bandage/wound without cues. Dressing brace: Patient instructed and demonstrated to don/doff velcro on brace using dominant side, keeping non-dominant side intact. Patient instructed and demonstrated in meantime of being able to stand with back against wall to don/doff brace, to don/doff seated using lap and bed/chair surface to support brace while manipulating. Dressing lower body: Patient instructed to don brace first and on the benefits to remain seated to don all clothing to increase independence with precautions and pain management.   Toileting: Patient instructed on the benefits of using flushable wet wipes and toilet tongs if decreased reach or pain for dustin care. Also, the benefits of a reacher to aid in clothing management. Home safety: Patient instructed and indicated understanding on home modifications and safety (raise height of ADL objects, appropriate height of chair surfaces, recliner safety, change of floor surfaces, clear pathways) to increase independence and fall prevention. Standing: Patient instructed and indicated understanding to walk up to sink/counter top/surfaces, step into walker, square off while using objects, slide objects along surfaces, to increase adherence to back precautions and fall prevention. Patient instructed to increase amount of time standing in order to increase independence and tolerance with ADLs. During prolonged standing, can open cabinet door or place foot on stool to decrease spinal pressure/increase pain. Patient instructed and indicated understanding the benefits of maintaining activity tolerance, functional mobility, and independence with self care tasks during acute stay  to ensure safe return home and to baseline. Encouraged patient to increase frequency and duration OOB, not sitting longer than 30 mins without marching/walking with staff, be out of bed for all meals, perform daily ADLs (as approved by RN/MD regarding bathing etc), and performing functional mobility to/from bathroom. Patient instruction and indicated understanding on body mechanics, ergonomics and gravitational force on the spine during different body positions to plan activities in prep for return home to complete instrumental ADLs and back to work safely. Cognitive Retraining  Safety/Judgement: Awareness of environment; Fall prevention;Good awareness of safety precautions; Home safety; Insight into deficits    Pain:  Pain Scale 1: Numeric (0 - 10)  Pain Intensity 1: 8  Pain Location 1: Back  Pain Orientation 1: Lower  Pain Description 1: Aching  Pain Intervention(s) 1: Medication (see MAR)    Activity Tolerance:   Fair  Please refer to the flowsheet for vital signs taken during this treatment. After treatment:   []  Patient left in no apparent distress sitting up in chair  [x]  Patient left in no apparent distress seated edge of bed with PT  [x]  Call bell left within reach  [x]  Nursing notified  [x]  Caregiver present - pt's mother and   []  Bed alarm activated    COMMUNICATION/EDUCATION:   Communication/Collaboration:  [x]      Home safety education was provided and the patient/caregiver indicated understanding. [x]      Patient/family have participated as able and agree with findings and recommendations. []      Patient is unable to participate in plan of care at this time.   Findings and recommendations were discussed with: Physical Therapist, Registered Nurse and Rehabilitation Attendant    Mirian Solano OT  Time Calculation: 50 mins

## 2017-03-08 NOTE — ROUTINE PROCESS
Bedside and Verbal shift change report given to Rollo Bumpers (oncoming nurse) by Trudee Schilder, RN (offgoing nurse). Report included the following information SBAR, Kardex, OR Summary, Procedure Summary, Intake/Output, MAR, Accordion, Recent Results and Med Rec Status.

## 2017-03-08 NOTE — PROGRESS NOTES
Problem: Mobility Impaired (Adult and Pediatric)  Goal: *Acute Goals and Plan of Care (Insert Text)  Physical Therapy Goals  Initiated 3/6/2017  1. Patient will move from supine to sit and sit to supine in bed with modified independence within 7 day(s). 2. Patient will transfer from bed to chair and chair to bed with modified independence using the least restrictive device within 7 day(s). 3. Patient will perform sit to stand with modified independence within 7 day(s). 4. Patient will ambulate with independence for 200 feet with the least restrictive device within 7 day(s). 5. Patient will ascend/descend 3 stairs with 1 handrail(s) with modified independence within 7 day(s). Physical Therapy Goals  Initiated 3/8/2017    1. Patient will move from supine to sit and sit to supine  in bed with modified independence within 4 days. 2. Patient will perform sit to stand with modified independence within 4 days. 3. Patient will ambulate with modified independence for 600 feet with the least restrictive device within 4 days. 4. Patient will ascend/descend 3 stairs with 1 handrail(s) with minimal assistance/contact guard assist within 4 days. 5. Patient will verbalize and demonstrate understanding of spinal precautions (No bending, lifting greater than 5 lbs, or twisting; log-roll technique; frequent repositioning as instructed) within 4 days          PHYSICAL THERAPY RE-EVALUATION  Patient: Gauri Schmidt (50 y.o. female)  Date: 3/8/2017  Precautions: Fall, Back (LSO brace when OOB)      ASSESSMENT:  Pt received sitting on EOB with OT. Pt now p.o. day #1 from L5-S1 discectomy. Dressing and hemovac in place. Ready to be fitted with LSO. Demonstrated donning for pt and family. Pt very drowsy from pain meds. HR elevated, yet RN aware. Sit to stand with CGA/HHA x2. Gait of 250' tolerated well with HHA x2. Returned to room for toileting. SBA needed due to drowsiness. Placed in chair with call bell.   Family present. Reinforced need for pt to stand to unload spine every 30-45min. Good progress. Pt w/o c/o radiculopathy in LE, only surgical site pain. Cary pack system coming to apply. Progression toward goals:  [X]      Improving appropriately and progressing toward goals  [ ]      Improving slowly and progressing toward goals  [ ]      Not making progress toward goals and plan of care will be adjusted       PLAN:  Patient continues to benefit from skilled intervention to address the above impairments. Continue treatment per established plan of care. Discharge Recommendations:  Home Health  Further Equipment Recommendations for Discharge:  tbd       SUBJECTIVE:   Patient stated it feels so much better now izabel with walking.    The patient stated 4/4 back precautions. Reviewed all 3 with patient. OBJECTIVE DATA SUMMARY:   Functional Mobility Training:  Bed Mobility:  Log Rolling: Stand-by asssistance; Additional time (log roll technique)  Supine to Sit: Stand-by asssistance; Additional time;Assist x1     Scooting: Stand-by asssistance; Additional time        Brace donned with  minimal assistance/contact guard assist   Transfers:  Sit to Stand: Contact guard assistance;Assist x2                                Ambulation/Gait Training:  Distance (ft): 250 Feet (ft)  Assistive Device: Gait belt  Ambulation - Level of Assistance: Contact guard assistance;Assist x2 (via HHAx2)                 Base of Support: Narrowed     Speed/Dawn: Slow  Step Length: Right shortened;Left shortened      Functional Measure:    Barthel Index:    Bathin  Bladder: 10  Bowels: 10  Groomin  Dressin  Feeding: 10  Mobility: 10  Stairs: 0  Toilet Use: 5  Transfer (Bed to Chair and Back): 10  Total: 65       Barthel and G-code impairment scale:  Percentage of impairment CH  0% CI  1-19% CJ  20-39% CK  40-59% CL  60-79% CM  80-99% CN  100%   Barthel Score 0-100 100 99-80 79-60 59-40 20-39 1-19   0   Barthel Score 0-20 20 17-19 13-16 9-12 5-8 1-4 0      The Barthel ADL Index: Guidelines  1. The index should be used as a record of what a patient does, not as a record of what a patient could do. 2. The main aim is to establish degree of independence from any help, physical or verbal, however minor and for whatever reason. 3. The need for supervision renders the patient not independent. 4. A patient's performance should be established using the best available evidence. Asking the patient, friends/relatives and nurses are the usual sources, but direct observation and common sense are also important. However direct testing is not needed. 5. Usually the patient's performance over the preceding 24-48 hours is important, but occasionally longer periods will be relevant. 6. Middle categories imply that the patient supplies over 50 per cent of the effort. 7. Use of aids to be independent is allowed. Gasper Velasquez., Barthel, D.W. (3218). Functional evaluation: the Barthel Index. [address] [facility] (14)2. Checo Oneal loren MAT Javier, Odilia Faustin, Delio Hammond., [place], [address] [place] [address] (1999). Measuring the change indisability after inpatient rehabilitation; comparison of the responsiveness of the Barthel Index and Functional [place] Measure. Journal of Neurology, Neurosurgery, and Psychiatry, 66(4), 395-017. [place] Bartholomew, [place].LUZ, ELIZABETH Figueroa, & Rickey Grullon, M.A. (2004.) Assessment of post-stroke quality of life in cost-effectiveness studies: The usefulness of the Barthel Index and the EuroQoL-5D. Quality of Life Research, 13, 390-43           Pain:  Pain Scale 1: Numeric (0 - 10)  Pain Intensity 1: 9  Pain Location 1: Back  Pain Orientation 1: Lower  Pain Description 1: Aching  Pain Intervention(s) 1: Medication (see MAR)  Activity Tolerance:   good  Please refer to the flowsheet for vital signs taken during this treatment.   After treatment:   [X]  Patient left in no apparent distress sitting up in chair  [ ]  Patient left in no apparent distress in bed  [X]  Call bell left within reach  [X]  Nursing notified  [X]  Caregiver present  [ ]  Bed alarm activated      COMMUNICATION/COLLABORATION:   The patients plan of care was discussed with: Occupational Therapist and Registered Nurse     Beauregard SAJAN Perera   Time Calculation: 35 mins

## 2017-03-08 NOTE — PROGRESS NOTES
ORTHOPAEDIC SPINE SURGERY PROGRESS NOTE    NAME:     Irene Michel   :       1985   MRN:       [id number]   DATE:      3/8/2017    POD:    1 Day Post-Op  S/P:    Procedure(s):  RIGHT SIDE L5-S1 SPINE LUMBAR LAMINECTOMY MICRODISCECTOMY/REVISION    SUBJECTIVE:    C/O back pain along surgical incision, still having severe back spasms but less frequently  No leg pain, weakness or numbness  Denies nausea/vomiting, chest pain, headache or shortness of breath    Recent Labs      17   0739   HGB  12.3   HCT  38.0   NA  138   K  3.8   CL  103   CO2  29   BUN  7   CREA  0.85   GLU  93     Patient Vitals for the past 12 hrs:   BP Temp Pulse Resp SpO2   17 0422 114/79 99.3 °F (37.4 °C) 92 16 99 %   17 0012 114/79 98.3 °F (36.8 °C) 84 16 100 %   17 1950 105/71 97.9 °F (36.6 °C) 74 16 100 %       EXAM:  Dressings clean, dry and intact   Positive strength/ROM bilat lower ext.   Neuro intact to sensation  Calves, soft & nontender  BL LEs NVID      PLAN:  Will continue PCA at this time  PT/OT, OOB  PT will fit pt for LSO brace  Advance regular diet      Tiarra Pradhan [place]  Orthopaedic Surgery  Physician Assistant to Dr. Faith Chung

## 2017-03-08 NOTE — PROGRESS NOTES
4674 Chart reviewed for PICC placement evaluation. Areas reviewed include, but are not limited to, patient's current and past H&P, Lab and Procedure Results, all notes, media records and medications. Spoke to Ling Brantley RN concerning intended plan of care with discharge. Patient with working PIV and d/c intended for 3/9 or 3/10 tentatively. Diet to be advance and pain control, orthopedic brace and intended antbx for d/c. Will monitor patient's progress and plan on placing line prior to d/c.

## 2017-03-08 NOTE — CONSULTS
Isma Palacios [facility] [address]   [address] [facility], [facility] [facility]   [address] [facility]       Name:  Mayo Temple   MR#:  [id number]   :  1985   Account #:  [de-identified]    Date of Consultation:  2017   Date of Adm:  2017       REQUESTING PHYSICIAN: Dr. Bismark Penn: Back pain. HISTORY OF PRESENT ILLNESS: The patient is a 78-year-old   female with no significant past medical history who is status post L5-S1   microdiskectomy recently. She did well postoperatively and even took   a trip to [place] recently. Last weekend, however, she developed   recurrent low back pain. MRI was done, which revealed a recurrent   disk herniation. Inflammatory markers were elevated and she   underwent aspiration of a subcutaneous fluid collection. Gram stain   returned growing gram-negative rods. The patient was admitted to [place] [address] for further evaluation and treatment. She was   taken back to the OR on 2017 where she underwent incision and   drainage of the wound. Intraoperative cultures are pending;   however, cultures from the aspiration are growing Enterobacter   cloacae. She is currently on ceftriaxone and the infectious diseases   service has been asked to assist with antibiotic management. PAST MEDICAL HISTORY: Unremarkable. PAST SURGICAL HISTORY: As per the HPI. SOCIAL HISTORY: She drinks alcohol occasionally. No tobacco or   illicit drug use. FAMILY HISTORY: Significant for diabetes mellitus. ALLERGIES: NO KNOWN DRUG ALLERGIES. OUTPATIENT MEDICATIONS: Please see body of chart for details. She was not on antibiotics prior to admission. REVIEW OF SYSTEMS: As per the HPI. Remainder of 12 system   review of systems unremarkable. LABORATORY DATA: White blood cell count 6200, platelet count   495,625. Creatinine is 0.79. C-reactive protein is 19. Cultures from   2017 are growing heavy Enterobacter aerogenes.  Of note, the JANET to ceftazidime is elevated at 8. It is sensitive to quinolones. PHYSICAL EXAMINATION   VITAL SIGNS: Temperature 97.8, maximal temperature is 101.3, heart   rate 101 beats per minute, blood pressure 93/55, oxygen saturation   100% on room air. GENERAL: Alert, in no acute distress. HEENT: Normocephalic, atraumatic. Pupils equal and reactive to light. No oropharyngeal lesions noted. CARDIOVASCULAR: Heart regular rate and rhythm. No murmurs,   gallops, or rubs. PULMONARY: Clear to auscultation anteriorly. ABDOMEN: Soft, nontender, nondistended. EXTREMITIES: No clubbing, cyanosis or edema. SKIN: The incision site is covered by a brace. ASSESSMENT AND PLAN:   1. Postoperative wound infection, status post recent L5-S1   microdiskectomy. She is status post incision and drainage of the   wound on 03/07/2017. Intraoperative cultures reveal gram-negative   rods. Her previous culture was growing Enterobacter aeruginosa. Of   note, this organism has an elevated JANET to ceftazidime. We will   change the antibiotics to meropenem. Will plan 6-week course with   ertapenem 1 gram IV daily. She will follow up with Infectious   Disease in approximately 5 weeks. Will determine whether antibiotics   may be stopped at 6 weeks or if she will need to be transitioned to an   oral antibiotic at that time. 2. Thrombocytopenia. Continue to follow. This may be related to   cefepime. Antibiotics will be changed as above. Thank you for allowing me to participate in the care of this patient.         DO MG Lora Arnold   D:  03/08/2017   16:06   T:  03/08/2017   16:24   Job #:  [id number]

## 2017-03-08 NOTE — PROGRESS NOTES
Bedside and Verbal shift change report given to [address] (oncoming nurse) by BUSTER Machado (offgoing nurse). Report given with SBAR, Kardex, OR Summary, Intake/Output, MAR and Recent Results.

## 2017-03-08 NOTE — PROGRESS NOTES
Problem: Mobility Impaired (Adult and Pediatric)  Goal: *Acute Goals and Plan of Care (Insert Text)  Physical Therapy Goals  Initiated 3/6/2017  1. Patient will move from supine to sit and sit to supine in bed with modified independence within 7 day(s). 2. Patient will transfer from bed to chair and chair to bed with modified independence using the least restrictive device within 7 day(s). 3. Patient will perform sit to stand with modified independence within 7 day(s). 4. Patient will ambulate with independence for 200 feet with the least restrictive device within 7 day(s). 5. Patient will ascend/descend 3 stairs with 1 handrail(s) with modified independence within 7 day(s). Physical Therapy Goals  Revised 3/8/2017  1. Patient will move from supine to sit and sit to supine  in bed with modified independence within 7 day(s). 2.  Patient will transfer from bed to chair and chair to bed with modified independence using the least restrictive device within 7 day(s). 3.  Patient will perform sit to stand with modified independence within 7 day(s). 4.  Patient will ambulate with supervision/set-up for 600 feet with the least restrictive device within 7 day(s). 5.  Patient will ascend/descend 3 stairs with 1 handrail(s) with minimal assistance/contact guard assist within 7 day(s). Comments:   PHYSICAL THERAPY TREATMENT  Patient: Irene Michel (57 y.o. female)  Date: 3/8/2017  Precautions: Fall, Back (LSO brace when OOB)      ASSESSMENT:  Pt received sitting up in chair. Ready and motivated  to walk. Much more alert vs morning. Set up to use RW this tx to provided additional support s/p surgery. Gait of 275' tolerated well although pace is very slow with  CGA to SBA x1. Returned to chair with call bell, PCA and mother present.    Progression toward goals:  [X]      Improving appropriately and progressing toward goals  [ ]      Improving slowly and progressing toward goals  [ ]      Not making progress toward goals and plan of care will be adjusted       PLAN:  Patient continues to benefit from skilled intervention to address the above impairments. Continue treatment per established plan of care. Discharge Recommendations:  Home Health  Further Equipment Recommendations for Discharge:  rolling walker to be determined tomorrow tx       SUBJECTIVE:   Patient stated I am feeling better then this morning and much more awake.    The patient stated 4/4 back precautions. Reviewed all 4 with patient. OBJECTIVE DATA SUMMARY:   Functional Mobility Training:  Bed Mobility:  Log Rolling: Stand-by asssistance; Additional time (log roll technique)  Supine to Sit: Stand-by asssistance; Additional time;Assist x1     Scooting: Stand-by asssistance; Additional time        Brace donned with  supervision/set-up   Transfers:  Sit to Stand: Contact guard assistance                                Ambulation/Gait Training:  Distance (ft): 275 Feet (ft)  Assistive Device: Walker, rolling;Gait belt;Brace/Splint  Ambulation - Level of Assistance: Contact guard assistance                 Base of Support: Narrowed     Speed/Dawn: Slow  Step Length: Right shortened;Left shortened                             Pain:  Pain Scale 1: Numeric (0 - 10)  Pain Intensity 1: 9  Pain Location 1: Back  Pain Orientation 1: Lower  Pain Description 1: Aching  Pain Intervention(s) 1: Medication (see MAR)  Activity Tolerance:   good  Please refer to the flowsheet for vital signs taken during this treatment.   After treatment:   [X]  Patient left in no apparent distress sitting up in chair  [ ]  Patient left in no apparent distress in bed  [X]  Call bell left within reach  [X]  Nursing notified  [X]  Caregiver present  [ ]  Bed alarm activated      COMMUNICATION/COLLABORATION:   The patients plan of care was discussed with: Registered Nurse     Eusebio Dubose, PT   Time Calculation: 35 mins

## 2017-03-09 ENCOUNTER — APPOINTMENT (OUTPATIENT)
Dept: GENERAL RADIOLOGY | Age: 32
DRG: 858 | End: 2017-03-09
Attending: PHYSICIAN ASSISTANT
Payer: COMMERCIAL

## 2017-03-09 LAB
ANION GAP BLD CALC-SCNC: 10 MMOL/L (ref 5–15)
BACTERIA SPEC CULT: ABNORMAL
BACTERIA SPEC CULT: NORMAL
BUN SERPL-MCNC: 6 MG/DL (ref 6–20)
BUN/CREAT SERPL: 9 (ref 12–20)
CALCIUM SERPL-MCNC: 8.2 MG/DL (ref 8.5–10.1)
CHLORIDE SERPL-SCNC: 102 MMOL/L (ref 97–108)
CO2 SERPL-SCNC: 26 MMOL/L (ref 21–32)
CREAT SERPL-MCNC: 0.66 MG/DL (ref 0.55–1.02)
ERYTHROCYTE [DISTWIDTH] IN BLOOD BY AUTOMATED COUNT: 12.9 % (ref 11.5–14.5)
GLUCOSE SERPL-MCNC: 86 MG/DL (ref 65–100)
GRAM STN SPEC: ABNORMAL
GRAM STN SPEC: ABNORMAL
HCT VFR BLD AUTO: 35.3 % (ref 35–47)
HGB BLD-MCNC: 11.5 G/DL (ref 11.5–16)
INR PPP: 1.1 (ref 0.9–1.1)
MCH RBC QN AUTO: 30.9 PG (ref 26–34)
MCHC RBC AUTO-ENTMCNC: 32.6 G/DL (ref 30–36.5)
MCV RBC AUTO: 94.9 FL (ref 80–99)
PLATELET # BLD AUTO: 137 K/UL (ref 150–400)
POTASSIUM SERPL-SCNC: 3.8 MMOL/L (ref 3.5–5.1)
PROTHROMBIN TIME: 11 SEC (ref 9–11.1)
RBC # BLD AUTO: 3.72 M/UL (ref 3.8–5.2)
SERVICE CMNT-IMP: ABNORMAL
SERVICE CMNT-IMP: NORMAL
SODIUM SERPL-SCNC: 138 MMOL/L (ref 136–145)
WBC # BLD AUTO: 6 K/UL (ref 3.6–11)

## 2017-03-09 PROCEDURE — 02HV33Z INSERTION OF INFUSION DEVICE INTO SUPERIOR VENA CAVA, PERCUTANEOUS APPROACH: ICD-10-PCS | Performed by: ORTHOPAEDIC SURGERY

## 2017-03-09 PROCEDURE — 74011250637 HC RX REV CODE- 250/637: Performed by: ORTHOPAEDIC SURGERY

## 2017-03-09 PROCEDURE — 36569 INSJ PICC 5 YR+ W/O IMAGING: CPT | Performed by: PHYSICIAN ASSISTANT

## 2017-03-09 PROCEDURE — 97116 GAIT TRAINING THERAPY: CPT

## 2017-03-09 PROCEDURE — 80048 BASIC METABOLIC PNL TOTAL CA: CPT | Performed by: ORTHOPAEDIC SURGERY

## 2017-03-09 PROCEDURE — 65270000029 HC RM PRIVATE

## 2017-03-09 PROCEDURE — 74011250636 HC RX REV CODE- 250/636: Performed by: INTERNAL MEDICINE

## 2017-03-09 PROCEDURE — 71010 XR CHEST PORT: CPT

## 2017-03-09 PROCEDURE — 85027 COMPLETE CBC AUTOMATED: CPT | Performed by: ORTHOPAEDIC SURGERY

## 2017-03-09 PROCEDURE — C1751 CATH, INF, PER/CENT/MIDLINE: HCPCS

## 2017-03-09 PROCEDURE — 85610 PROTHROMBIN TIME: CPT | Performed by: ORTHOPAEDIC SURGERY

## 2017-03-09 PROCEDURE — 74011000258 HC RX REV CODE- 258: Performed by: INTERNAL MEDICINE

## 2017-03-09 PROCEDURE — 77030018786 HC NDL GD F/USND BARD -B

## 2017-03-09 PROCEDURE — 77030018719 HC DRSG PTCH ANTIMIC J&J -A

## 2017-03-09 PROCEDURE — 36415 COLL VENOUS BLD VENIPUNCTURE: CPT | Performed by: ORTHOPAEDIC SURGERY

## 2017-03-09 PROCEDURE — 76937 US GUIDE VASCULAR ACCESS: CPT

## 2017-03-09 RX ORDER — SODIUM CHLORIDE 0.9 % (FLUSH) 0.9 %
10-30 SYRINGE (ML) INJECTION AS NEEDED
Status: DISCONTINUED | OUTPATIENT
Start: 2017-03-09 | End: 2017-03-10 | Stop reason: HOSPADM

## 2017-03-09 RX ORDER — SODIUM CHLORIDE 0.9 % (FLUSH) 0.9 %
20 SYRINGE (ML) INJECTION EVERY 24 HOURS
Status: DISCONTINUED | OUTPATIENT
Start: 2017-03-09 | End: 2017-03-10 | Stop reason: HOSPADM

## 2017-03-09 RX ORDER — SODIUM CHLORIDE 0.9 % (FLUSH) 0.9 %
10 SYRINGE (ML) INJECTION AS NEEDED
Status: DISCONTINUED | OUTPATIENT
Start: 2017-03-09 | End: 2017-03-10 | Stop reason: HOSPADM

## 2017-03-09 RX ORDER — SODIUM CHLORIDE 0.9 % (FLUSH) 0.9 %
10-40 SYRINGE (ML) INJECTION EVERY 8 HOURS
Status: DISCONTINUED | OUTPATIENT
Start: 2017-03-09 | End: 2017-03-10 | Stop reason: HOSPADM

## 2017-03-09 RX ORDER — SODIUM CHLORIDE 0.9 % (FLUSH) 0.9 %
10 SYRINGE (ML) INJECTION EVERY 24 HOURS
Status: DISCONTINUED | OUTPATIENT
Start: 2017-03-09 | End: 2017-03-10 | Stop reason: HOSPADM

## 2017-03-09 RX ADMIN — MEROPENEM 500 MG: 500 INJECTION, POWDER, FOR SOLUTION INTRAVENOUS at 18:46

## 2017-03-09 RX ADMIN — OXYCODONE HYDROCHLORIDE AND ACETAMINOPHEN 1 TABLET: 10; 325 TABLET ORAL at 13:12

## 2017-03-09 RX ADMIN — OXYCODONE HYDROCHLORIDE AND ACETAMINOPHEN 1 TABLET: 10; 325 TABLET ORAL at 08:01

## 2017-03-09 RX ADMIN — DOCUSATE SODIUM 100 MG: 100 CAPSULE ORAL at 18:47

## 2017-03-09 RX ADMIN — FAMOTIDINE 20 MG: 20 TABLET, FILM COATED ORAL at 20:40

## 2017-03-09 RX ADMIN — Medication 10 ML: at 13:16

## 2017-03-09 RX ADMIN — OXYCODONE HYDROCHLORIDE AND ACETAMINOPHEN 1 TABLET: 10; 325 TABLET ORAL at 20:40

## 2017-03-09 RX ADMIN — Medication 10 ML: at 06:08

## 2017-03-09 RX ADMIN — DOCUSATE SODIUM 100 MG: 100 CAPSULE ORAL at 08:00

## 2017-03-09 RX ADMIN — Medication 10 ML: at 22:00

## 2017-03-09 RX ADMIN — MEROPENEM 500 MG: 500 INJECTION, POWDER, FOR SOLUTION INTRAVENOUS at 06:07

## 2017-03-09 RX ADMIN — FAMOTIDINE 20 MG: 20 TABLET, FILM COATED ORAL at 08:00

## 2017-03-09 RX ADMIN — MEROPENEM 500 MG: 500 INJECTION, POWDER, FOR SOLUTION INTRAVENOUS at 13:12

## 2017-03-09 RX ADMIN — CYCLOBENZAPRINE HYDROCHLORIDE 10 MG: 10 TABLET, FILM COATED ORAL at 06:05

## 2017-03-09 RX ADMIN — MEROPENEM 500 MG: 500 INJECTION, POWDER, FOR SOLUTION INTRAVENOUS at 01:52

## 2017-03-09 NOTE — PROGRESS NOTES
ORTHOPAEDIC SPINE SURGERY PROGRESS NOTE    NAME:     Elpidio Doe   :       1985   MRN:       [id number]   DATE:      3/9/2017    POD:    2 Days Post-Op  S/P:    Procedure(s):  RIGHT SIDE L5-S1 SPINE LUMBAR LAMINECTOMY MICRODISCECTOMY/REVISION    SUBJECTIVE:    C/O back pain  She has not had any back spasms since yesterday morning  No leg pain, weakness or numbness  Denies nausea/vomiting, chest pain, headache or shortness of breath  Pain controlled     Recent Labs      17   0556   HGB  11.5   HCT  35.3   INR  1.1   NA  138   K  3.8   CL  102   CO2  26   BUN  6   CREA  0.66   GLU  86     Patient Vitals for the past 12 hrs:   BP Temp Pulse Resp SpO2   17 0331 102/64 98.6 °F (37 °C) 89 16 95 %   17 2358 110/69 - 82 16 97 %   17 2127 107/71 98.2 °F (36.8 °C) 92 16 100 %       EXAM:  Dressings clean, dry and intact   Positive strength/ROM bilat lower ext. Neuro intact to sensation  Calves, soft & nontender  BL LEs NVID    Right forearm IV has infiltrated. No s/s of infx.  D/w PHILIP Cooper      PLAN:  PICC line to be placed today  D/C PCA, change to PO pain medications  PT/OT, OOB  Advance regular diet      Dami Galvin [place]  Orthopaedic Surgery  Physician Assistant to Dr. Jeannie Francisco

## 2017-03-09 NOTE — PROGRESS NOTES
CM note:    Referral sent to [facility]. Patient covered at 100%. CM to follow. Thanks.   Keiko Vasquez LCSW

## 2017-03-09 NOTE — PROGRESS NOTES
Message left for PICC team that patient's peripheral line has infiltrated and RUPINDER Simmons would like PICC placed today.

## 2017-03-09 NOTE — PROGRESS NOTES
Bedside and Verbal shift change report given to Bina (oncoming nurse) by Vivienne Silverio (offgoing nurse). Report included the following information SBAR, Kardex, Intake/Output, MAR and Recent Results.

## 2017-03-09 NOTE — PROGRESS NOTES
Bedside and Verbal shift change report given to Jocelyn Monet RN (oncoming nurse) by Helen Jansen RN (offgoing nurse). Report included the following information SBAR, Kardex, Procedure Summary, Intake/Output, MAR and Recent Results.

## 2017-03-09 NOTE — PROGRESS NOTES
Problem: Mobility Impaired (Adult and Pediatric)  Goal: *Acute Goals and Plan of Care (Insert Text)  Physical Therapy Goals  Initiated 3/6/2017  1. Patient will move from supine to sit and sit to supine in bed with modified independence within 7 day(s). 2. Patient will transfer from bed to chair and chair to bed with modified independence using the least restrictive device within 7 day(s). 3. Patient will perform sit to stand with modified independence within 7 day(s). 4. Patient will ambulate with independence for 200 feet with the least restrictive device within 7 day(s). 5. Patient will ascend/descend 3 stairs with 1 handrail(s) with modified independence within 7 day(s). PHYSICAL THERAPY TREATMENT  Patient: Milagros Beal (75 y.o. female)  Date: 3/9/2017  Precautions: Fall, Back (LSO brace when OOB)      ASSESSMENT:  Pt received supine in bed. PICC line in place L UE. Pt motivated to walk and stating feeling so much better. Supine to sit with Supervision to Mod I. Applied brace with Mod I. Gait to bathroom with SBA. Toileted independently. Tolerated gait of 600' with SBA very well. Pace in slow, but stable and safe. Pt cleared to walk with family in halls ad justice. RN notified. Pt place in chair with cold pack to back. Good progress stair management this afternoon or am.  Progression toward goals:  [X]      Improving appropriately and progressing toward goals  [ ]      Improving slowly and progressing toward goals  [ ]      Not making progress toward goals and plan of care will be adjusted       PLAN:  Patient continues to benefit from skilled intervention to address the above impairments. Continue treatment per established plan of care. Discharge Recommendations:  Home Health and/or Outpatient per MD  Further Equipment Recommendations for Discharge:  none       SUBJECTIVE:   Patient stated I feel better and better.    The patient stated 4/4 back precautions. Reviewed all 3 with patient. OBJECTIVE DATA SUMMARY:   Functional Mobility Training:  Bed Mobility:  Log Rolling: Modified independent  Supine to Sit: Modified independent     Scooting: Modified independent        Brace donned with set up to  modified independence   Transfers:  Sit to Stand: Supervision;Modified independent                                Ambulation/Gait Training:  Distance (ft): 600 Feet (ft)  Assistive Device: Gait belt;Brace/Splint  Ambulation - Level of Assistance: Stand-by asssistance                       Speed/Dawn: Slow                                Pain:  Pain Scale 1: Numeric (0 - 10)  Pain Intensity 1: 3 (stopping PCA)  Pain Location 1: Back  Pain Orientation 1: Posterior  Pain Description 1: Sore  Pain Intervention(s) 1: Medication (see MAR)  Activity Tolerance:   good  Please refer to the flowsheet for vital signs taken during this treatment.   After treatment:   [X]  Patient left in no apparent distress sitting up in chair  [ ]  Patient left in no apparent distress in bed  [X]  Call bell left within reach  [X]  Nursing notified  [ ]  Caregiver present  [ ]  Bed alarm activated      COMMUNICATION/COLLABORATION:   The patients plan of care was discussed with: Registered Nurse     Jayc Figures, PT   Time Calculation: 34 mins

## 2017-03-09 NOTE — PROGRESS NOTES
[facility] Infectious Diseases Progress Note  Arturo Roach MD,             Malachi Oliver MD,          Tony Sanchez DO     [address] [place] [address]    [address], [address]  [phone]  Fax    3/9/2017      Assessment & Plan:   1. Postoperative wound infection, status post recent L5-S1 microdiskectomy. She is status post incision and drainage of the wound on 2017. Intraoperative cultures reveal gram-negative rods. Her previous culture was growing Enterobacter aeruginosa. Of note, this organism has an elevated JANET to ceftazidime. We will change the antibiotics to meropenem. Will plan 6-week course with ertapenem 1 gram IV daily through . She will follow up with Infectious Disease in approximately 5 weeks to  determine whether antibiotics may be stopped at 6 weeks or if she will need to be transitioned to an oral antibiotic at that time. IV abx orders in chart. Need to await operative culture results prior to discharge  2. Thrombocytopenia. Continue to follow. This may be related to cefepime. Better today               Subjective:     Feeling much better today    Objective:     Vitals:   Visit Vitals    /72 (BP 1 Location: Right arm, BP Patient Position: At rest)    Pulse 97    Temp 97.5 °F (36.4 °C)    Resp 17    Ht 5' 3\" (1.6 m)    Wt 49.4 kg (109 lb)    LMP 2017    SpO2 99%    Breastfeeding No    BMI 19.31 kg/m2        Tmax:  Temp (24hrs), Av.9 °F (36.6 °C), Min:97.4 °F (36.3 °C), Max:98.6 °F (37 °C)      Exam:   Patient is intubated:  no    Physical Examination:   GENERAL ASSESSMENT: NAD  HEENT:Nontraumatic NCAT  CHEST: No distress  HEART: No edema  ABDOMEN:   :Prabhakar: no  EXTREMITY: Brace   NEURO:Grossly non focal    Labs:        No lab exists for component: ITNL   No results for input(s): CPK, CKMB, TROIQ in the last 72 hours.   Recent Labs      17   0556  17   0655  17   0739   NA  138  137  138   K  3.8  3.5  3.8   CL  102  101 103   CO2  26  28  29   BUN  6  6  7   CREA  0.66  0.79  0.85   GLU  86  89  93   WBC  6.0  6.2  6.9   HGB  11.5  11.9  12.3   HCT  35.3  37.8  38.0   PLT  137*  127*  174     Recent Labs      03/09/17   0556   INR  1.1   PTP  11.0     Needs: urine analysis, urine sodium, protein and creatinine  No results found for: RAISSA, CREAU      Cultures:     No results found for: SDES  Lab Results   Component Value Date/Time    Culture result: MODERATE  GRAM NEGATIVE RODS   03/07/2017 11:26 AM    Culture result: HEAVY  ENTEROBACTER AEROGENES   03/06/2017 12:05 PM    Culture result: NO ANAEROBES ISOLATED 03/06/2017 12:05 PM       Radiology:     Medications       Current Facility-Administered Medications   Medication Dose Route Frequency Last Dose    alteplase (CATHFLO) 1 mg in sterile water (preservative free) 1 mL injection  1 mg InterCATHeter PRN      sodium chloride (NS) flush 10-30 mL  10-30 mL InterCATHeter PRN      sodium chloride (NS) flush 10 mL  10 mL InterCATHeter Q24H      sodium chloride (NS) flush 10 mL  10 mL InterCATHeter PRN      sodium chloride (NS) flush 10-40 mL  10-40 mL InterCATHeter Q8H      sodium chloride (NS) flush 20 mL  20 mL InterCATHeter Q24H      cyclobenzaprine (FLEXERIL) tablet 10 mg  10 mg Oral TID PRN 10 mg at 03/09/17 0605    meropenem (MERREM) 500 mg in 0.9% sodium chloride (MBP/ADV) 50 mL  500 mg IntraVENous Q6H 500 mg at 03/09/17 0607    0.9% sodium chloride infusion  125 mL/hr IntraVENous CONTINUOUS Stopped at 03/09/17 0800    sodium chloride (NS) flush 5-10 mL  5-10 mL IntraVENous Q8H 10 mL at 03/09/17 0608    sodium chloride (NS) flush 5-10 mL  5-10 mL IntraVENous PRN      diazePAM (VALIUM) tablet 5 mg  5 mg Oral Q6H PRN 5 mg at 03/08/17 1405    acetaminophen (TYLENOL) tablet 650 mg  650 mg Oral Q4H PRN      oxyCODONE-acetaminophen (PERCOCET 10)  mg per tablet 1 Tab  1 Tab Oral Q4H PRN 1 Tab at 03/09/17 0801    HYDROmorphone (PF) (DILAUDID) injection 1 mg  1 mg IntraVENous Q4H PRN      naloxone (NARCAN) injection 0.4 mg  0.4 mg IntraVENous PRN      ondansetron (ZOFRAN) injection 4 mg  4 mg IntraVENous Q4H PRN      diphenhydrAMINE (BENADRYL) capsule 25 mg  25 mg Oral Q4H PRN      docusate sodium (COLACE) capsule 100 mg  100 mg Oral  mg at 03/09/17 0800    zolpidem (AMBIEN) tablet 5 mg  5 mg Oral QHS PRN      acetaminophen (TYLENOL) tablet 650 mg  650 mg Oral Q4H PRN      famotidine (PEPCID) tablet 20 mg  20 mg Oral Q12H 20 mg at 03/09/17 0800           Case discussed with:LOIS Roman, DO

## 2017-03-09 NOTE — PROGRESS NOTES
Spoke to [facility], RN regarding [facility]. Infiltration and plan to come place PICC line this am for discharge for Lt Antbx infusion.

## 2017-03-09 NOTE — PROGRESS NOTES
Problem: Mobility Impaired (Adult and Pediatric)  Goal: *Acute Goals and Plan of Care (Insert Text)  Physical Therapy Goals  Initiated 3/6/2017  1. Patient will move from supine to sit and sit to supine in bed with modified independence within 7 day(s). 2. Patient will transfer from bed to chair and chair to bed with modified independence using the least restrictive device within 7 day(s). 3. Patient will perform sit to stand with modified independence within 7 day(s). 4. Patient will ambulate with independence for 200 feet with the least restrictive device within 7 day(s). 5. Patient will ascend/descend 3 stairs with 1 handrail(s) with modified independence within 7 day(s). PHYSICAL THERAPY TREATMENT  Patient: Elpidio Doe (83 y.o. female)  Date: 3/9/2017  Precautions: Fall, Back (LSO brace when OOB)      ASSESSMENT:  Pt received supine in bed. Supine to sit with Mod I.  Gait into bathroom with Mod I. Toileted self with Mod I.  Gait of another 250' with Supervision. Managed stairs of 4 with 2 rails and CGA to SBA. Pt cleared to walk with family in halls and reduced to daily vs bid PT tx. Cleared for discharge in morning by PT if orders placed. Progression toward goals:  [X]      Improving appropriately and progressing toward goals  [ ]      Improving slowly and progressing toward goals  [ ]      Not making progress toward goals and plan of care will be adjusted       PLAN:  Patient continues to benefit from skilled intervention to address the above impairments. Continue treatment per established plan of care. Discharge Recommendations:  Home Health and/or Outpatient per MD  Further Equipment Recommendations for Discharge:  none       SUBJECTIVE:   Patient stated I am doing so much better.    The patient stated 3/3 back precautions. Reviewed all 3 with patient.       OBJECTIVE DATA SUMMARY:   Functional Mobility Training:  Bed Mobility:  Log Rolling: Modified independent  Supine to Sit: Modified independent     Scooting: Modified independent        Brace donned with  supervision/set-up   Transfers:  Sit to Stand: Supervision;Modified independent                                Ambulation/Gait Training:  Distance (ft): 600 Feet (ft)  Assistive Device: Gait belt;Brace/Splint  Ambulation - Level of Assistance: Stand-by asssistance                       Speed/Dawn: Slow                                  Stairs:  Number of Stairs Trained: 4  Stairs - Level of Assistance: Contact guard assistance;Stand-by asssistance  Rail Use: Both  Pain:  Pain Scale 1: Numeric (0 - 10)  Pain Intensity 1: 4  Pain Location 1: Back  Pain Orientation 1: Posterior  Pain Description 1: Sore  Pain Intervention(s) 1: Medication (see MAR)  Activity Tolerance:   good  Please refer to the flowsheet for vital signs taken during this treatment.   After treatment:   [X]  Patient left in no apparent distress sitting up in chair  [ ]  Patient left in no apparent distress in bed  [X]  Call bell left within reach  [X]  Nursing notified  [ ]  Caregiver present  [ ]  Bed alarm activated      COMMUNICATION/COLLABORATION:   The patients plan of care was discussed with: Registered Nurse     Rina Lindsay PT   Time Calculation: 15 mins

## 2017-03-09 NOTE — PROGRESS NOTES
[facility] Infectious Diseases Outpatient  IV Antibiotic Orders    1. Diagnosis:  Post op spine infection  Enterobacter  2. Routine PICC/ Fransisca/ Portacath Care including PRN cath-flow/activase to declot   3. Antibiotic:  invanz 1gm IV daily through 4/18/17  4. Last labs  Lab Results   Component Value Date/Time    WBC 6.0 03/09/2017 05:56 AM    HGB 11.5 03/09/2017 05:56 AM    HCT 35.3 03/09/2017 05:56 AM    PLATELET 659 35/35/8868 05:56 AM    MCV 94.9 03/09/2017 05:56 AM     Lab Results   Component Value Date/Time    Sodium 138 03/09/2017 05:56 AM    Potassium 3.8 03/09/2017 05:56 AM    Chloride 102 03/09/2017 05:56 AM    CO2 26 03/09/2017 05:56 AM    Anion gap 10 03/09/2017 05:56 AM    Glucose 86 03/09/2017 05:56 AM    BUN 6 03/09/2017 05:56 AM    Creatinine 0.66 03/09/2017 05:56 AM    BUN/Creatinine ratio 9 03/09/2017 05:56 AM    GFR est AA >60 03/09/2017 05:56 AM    GFR est non-AA >60 03/09/2017 05:56 AM    Calcium 8.2 03/09/2017 05:56 AM    Bilirubin, total 0.7 03/05/2017 10:56 AM    AST (SGOT) 17 03/05/2017 10:56 AM    Alk. phosphatase 47 03/05/2017 10:56 AM    Protein, total 6.9 03/05/2017 10:56 AM    Albumin 3.3 03/05/2017 10:56 AM    Globulin 3.6 03/05/2017 10:56 AM    A-G Ratio 0.9 03/05/2017 10:56 AM    ALT (SGPT) 27 03/05/2017 10:56 AM       Last Vanc trough:     5. _x__ Weekly Labs:      ____ Bi Weekly Labs:  __________     Start date:  _____     _x__CBC/diff/platelets   _x__AST/ALT/Alk phos/   ___CPK   _x__BUN/CRT   ___ESR   _x__CRP   ___Gentamicin level  ___gentamicin peak/trough   ___Trough Vancomycin level    ___Goal 15-20 ___Goal 10-15    6. Fax Final lab results and critical values to Shae @[phone]  7. Call urgent lab results to [phone]. Allergies:  No Known Allergies  9. Pharmacy: [facility]          Home Health Nursing:  10. Pharmacy Consult for Vancomycin/Aminoglycoside Dosing  11.  First Dose protocol as needed.    12. Please pull PIC line at end of therapy or send to IR for [facility] removal     Home Health: Call [facility] at [address] [place] [address] to schedule Fransisca Removal :  P:  [phone]    Fax: [phone] or [address], [place]

## 2017-03-10 VITALS
SYSTOLIC BLOOD PRESSURE: 97 MMHG | WEIGHT: 109 LBS | DIASTOLIC BLOOD PRESSURE: 66 MMHG | OXYGEN SATURATION: 96 % | TEMPERATURE: 98.1 F | RESPIRATION RATE: 18 BRPM | BODY MASS INDEX: 19.31 KG/M2 | HEIGHT: 63 IN | HEART RATE: 109 BPM

## 2017-03-10 LAB
ANION GAP BLD CALC-SCNC: 4 MMOL/L (ref 5–15)
BUN SERPL-MCNC: 8 MG/DL (ref 6–20)
BUN/CREAT SERPL: 12 (ref 12–20)
CALCIUM SERPL-MCNC: 8.4 MG/DL (ref 8.5–10.1)
CHLORIDE SERPL-SCNC: 104 MMOL/L (ref 97–108)
CO2 SERPL-SCNC: 30 MMOL/L (ref 21–32)
CREAT SERPL-MCNC: 0.68 MG/DL (ref 0.55–1.02)
ERYTHROCYTE [DISTWIDTH] IN BLOOD BY AUTOMATED COUNT: 12.9 % (ref 11.5–14.5)
GLUCOSE SERPL-MCNC: 108 MG/DL (ref 65–100)
HCT VFR BLD AUTO: 33.8 % (ref 35–47)
HGB BLD-MCNC: 11.2 G/DL (ref 11.5–16)
MCH RBC QN AUTO: 31.4 PG (ref 26–34)
MCHC RBC AUTO-ENTMCNC: 33.1 G/DL (ref 30–36.5)
MCV RBC AUTO: 94.7 FL (ref 80–99)
PLATELET # BLD AUTO: 147 K/UL (ref 150–400)
POTASSIUM SERPL-SCNC: 4 MMOL/L (ref 3.5–5.1)
RBC # BLD AUTO: 3.57 M/UL (ref 3.8–5.2)
SODIUM SERPL-SCNC: 138 MMOL/L (ref 136–145)
WBC # BLD AUTO: 6.3 K/UL (ref 3.6–11)

## 2017-03-10 PROCEDURE — 74011000258 HC RX REV CODE- 258: Performed by: INTERNAL MEDICINE

## 2017-03-10 PROCEDURE — 74011250637 HC RX REV CODE- 250/637: Performed by: ORTHOPAEDIC SURGERY

## 2017-03-10 PROCEDURE — 85027 COMPLETE CBC AUTOMATED: CPT | Performed by: ORTHOPAEDIC SURGERY

## 2017-03-10 PROCEDURE — 74011250637 HC RX REV CODE- 250/637: Performed by: PHYSICIAN ASSISTANT

## 2017-03-10 PROCEDURE — 80048 BASIC METABOLIC PNL TOTAL CA: CPT | Performed by: ORTHOPAEDIC SURGERY

## 2017-03-10 PROCEDURE — 74011250636 HC RX REV CODE- 250/636: Performed by: INTERNAL MEDICINE

## 2017-03-10 PROCEDURE — 36415 COLL VENOUS BLD VENIPUNCTURE: CPT | Performed by: ORTHOPAEDIC SURGERY

## 2017-03-10 PROCEDURE — 36592 COLLECT BLOOD FROM PICC: CPT

## 2017-03-10 RX ORDER — FACIAL-BODY WIPES
10 EACH TOPICAL DAILY PRN
Status: DISCONTINUED | OUTPATIENT
Start: 2017-03-10 | End: 2017-03-10 | Stop reason: HOSPADM

## 2017-03-10 RX ORDER — CYCLOBENZAPRINE HCL 10 MG
10 TABLET ORAL
Qty: 60 TAB | Refills: 2 | Status: SHIPPED
Start: 2017-03-10 | End: 2017-04-04

## 2017-03-10 RX ORDER — PROMETHAZINE HYDROCHLORIDE 25 MG/1
25 TABLET ORAL
Qty: 60 TAB | Refills: 2 | Status: SHIPPED
Start: 2017-03-10

## 2017-03-10 RX ORDER — DOCUSATE SODIUM 100 MG/1
100 CAPSULE, LIQUID FILLED ORAL 2 TIMES DAILY
Qty: 60 CAP | Refills: 2 | Status: SHIPPED
Start: 2017-03-10

## 2017-03-10 RX ORDER — OXYCODONE AND ACETAMINOPHEN 5; 325 MG/1; MG/1
TABLET ORAL
Qty: 90 TAB | Refills: 0 | Status: SHIPPED
Start: 2017-03-10 | End: 2017-03-21

## 2017-03-10 RX ORDER — ADHESIVE BANDAGE
30 BANDAGE TOPICAL
Status: COMPLETED | OUTPATIENT
Start: 2017-03-10 | End: 2017-03-10

## 2017-03-10 RX ADMIN — MEROPENEM 500 MG: 500 INJECTION, POWDER, FOR SOLUTION INTRAVENOUS at 01:47

## 2017-03-10 RX ADMIN — Medication 10 ML: at 15:17

## 2017-03-10 RX ADMIN — Medication 10 ML: at 10:04

## 2017-03-10 RX ADMIN — Medication 20 ML: at 10:05

## 2017-03-10 RX ADMIN — FAMOTIDINE 20 MG: 20 TABLET, FILM COATED ORAL at 10:04

## 2017-03-10 RX ADMIN — DOCUSATE SODIUM 100 MG: 100 CAPSULE ORAL at 10:04

## 2017-03-10 RX ADMIN — SODIUM CHLORIDE 1 G: 900 INJECTION, SOLUTION INTRAVENOUS at 11:08

## 2017-03-10 RX ADMIN — CYCLOBENZAPRINE HYDROCHLORIDE 10 MG: 10 TABLET, FILM COATED ORAL at 10:04

## 2017-03-10 RX ADMIN — OXYCODONE HYDROCHLORIDE AND ACETAMINOPHEN 1 TABLET: 10; 325 TABLET ORAL at 11:08

## 2017-03-10 RX ADMIN — OXYCODONE HYDROCHLORIDE AND ACETAMINOPHEN 1 TABLET: 10; 325 TABLET ORAL at 15:14

## 2017-03-10 RX ADMIN — OXYCODONE HYDROCHLORIDE AND ACETAMINOPHEN 1 TABLET: 10; 325 TABLET ORAL at 01:47

## 2017-03-10 RX ADMIN — DIAZEPAM 5 MG: 5 TABLET ORAL at 13:29

## 2017-03-10 RX ADMIN — MEROPENEM 500 MG: 500 INJECTION, POWDER, FOR SOLUTION INTRAVENOUS at 06:52

## 2017-03-10 RX ADMIN — MAGNESIUM HYDROXIDE 30 ML: 400 SUSPENSION ORAL at 10:04

## 2017-03-10 RX ADMIN — OXYCODONE HYDROCHLORIDE AND ACETAMINOPHEN 1 TABLET: 10; 325 TABLET ORAL at 06:53

## 2017-03-10 NOTE — DISCHARGE SUMMARY
DISCHARGE SUMMARY     Patient: Jermain Patient                             Medical Record Number: [id number]                : 1985  Age: 33 y.o. Admit Date: 3/5/2017  Discharge Date:   Admission Diagnosis: Intractable Lower Back Pain  Disc Herniation  Lumbar disc herniation  Discharge Diagnosis: Disc Herniation  Procedures: Procedure(s):  RIGHT SIDE L5-S1 SPINE LUMBAR LAMINECTOMY MICRODISCECTOMY/REVISION  Surgeon: Cristela Dooley MD  Co-surgeon:   Assistants:   Anesthesia: General  Complications: None     History of Present Illness:  Jermain Narvaez is a 33 y.o. female with a history of undergoing a Right sided L5-S1 microdiscetomy on 17. She was initially doing very well after surgery. She was last seen in our office on 3/3/17. At that time, she was feeling good without any complaint. She developed severe back spasms on 3/5/17 which prompted her to be seen in [facility] ED twice. A new MRI was obtained showing a recurrent disc protrusion at L5-S1 and a small amount fluid just below the incsion. She was admitted to [facility] for intractable back pain. A lumbar aspiration was performed on 3/6/17. The preliminary result was + for gram negative rods. It was determined that she did have a post-operative infection. She was taken to the OR for a Revision microdiscectomy/Incision and Drainage. During surgery, deep spine cultures were collected.      Hospital Course: Jermain Narvaez tolerated the procedure well. She was transferred to the recovery room in stable condition. After a brief stay, the patient was then transferred to the Orthopedic floor. On postoperative day #1, the dressing was clean and dry and she was neurovascularly intact. The patient was afebrile and vital signs were stable. Calves were soft and non-tender bilaterally. Her aspiration and deep spine cultures were positive for enterobacter aerogenes. During admission, patient was evaluated by Infectious Disease who recommended treatment with IV ertapenem.  A PICC line was placed. Trudi Ravi made excellent progress with physical therapy and treatment. She was discharged Home with Home Health in stable condition on postoperative day 3. She was provided with routine postoperative instructions and advised to follow up in my office in 3 weeks following discharge from the hospital. She was given prescriptions for medication to control post-operative symptoms. Trudi Ravi will be followed by our office as well as Infectious Disease during her outpatient treatment. Discharge Medications:  Discharge Medication List as of 3/10/2017  2:04 PM      START taking these medications    Details   promethazine (PHENERGAN) 25 mg tablet Take 1 Tab by mouth every six (6) hours as needed for Nausea., No Print, Disp-60 Tab, R-2      docusate sodium (COLACE) 100 mg capsule Take 1 Cap by mouth two (2) times a day., No Print, Disp-60 Cap, R-2      cyclobenzaprine (FLEXERIL) 10 mg tablet Take 1 Tab by mouth three (3) times daily as needed for Muscle Spasm(s). , No Print, Disp-60 Tab, R-2         CONTINUE these medications which have CHANGED    Details   oxyCODONE-acetaminophen (PERCOCET) 5-325 mg per tablet Take 1-2 tablets by mouth every 4 to 6 hours as needed for pain, No Print, Disp-90 Tab, R-0         CONTINUE these medications which have NOT CHANGED    Details   multivitamin (ONE A DAY) tablet Take 1 Tab by mouth daily. , Historical Med      NORETHINDRONE-E.ESTRADIOL-IRON (JUNEL FE 1/20, 28, PO) Take 1 Tab by mouth daily. , Historical Med         STOP taking these medications       HYDROmorphone (DILAUDID) 2 mg tablet Comments:   Reason for Stopping:         diazePAM (VALIUM) 5 mg tablet Comments:   Reason for Stopping:         HYDROcodone-acetaminophen (NORCO) 7.5-325 mg per tablet Comments:   Reason for Stopping:               RUPINDER Bassett  3/10/2017  Orthopaedic Surgery  Physician Assistant to Dr. Vidal Comp

## 2017-03-10 NOTE — PROGRESS NOTES
PHYSICAL THERAPY    945 Chart reviewed, spoke with RN. Pt received standing in room. Pt successfully performed stair training and has been cleared by PT to be up ad justice with family on 3/9/17. Currently has no additional questions for PT. Pt is cleared for discharge from hospital from PT perspective. HHPT to follow upon discharge. Salvatore Frank

## 2017-03-10 NOTE — PROGRESS NOTES
ORTHOPAEDIC SPINE SURGERY PROGRESS NOTE    NAME:     Clarence De Oliveira   :       1985   MRN:       [id number]   DATE:      3/10/2017    POD:    3 Days Post-Op  S/P:    Procedure(s):  RIGHT SIDE L5-S1 SPINE LUMBAR LAMINECTOMY MICRODISCECTOMY/REVISION    SUBJECTIVE:    C/O back pain along surgical incision, improving  No leg pain, weakness or numbness  Denies nausea/vomiting, chest pain, headache or shortness of breath  Pain controlled    Recent Labs      03/10/17   0154  17   0556   HGB  11.2*  11.5   HCT  33.8*  35.3   INR   --   1.1   NA  138  138   K  4.0  3.8   CL  104  102   CO2  30  26   BUN  8  6   CREA  0.68  0.66   GLU  108*  86     Patient Vitals for the past 12 hrs:   BP Temp Pulse Resp SpO2   03/10/17 0839 103/73 97.5 °F (36.4 °C) 90 18 100 %   03/10/17 0407 103/69 97.5 °F (36.4 °C) 71 18 96 %       EXAM:  Dressings clean, dry and intact   Positive strength/ROM bilat lower ext.   Neuro intact to sensation  Calves, soft & nontender  BL LEs NVID      PLAN:  Continue PO pain medications  PT/OT, OOB  Advance regular diet  D/C home today w/ home health      Marzena Obregon [place]  Orthopaedic Surgery  Physician Assistant to Dr. Lon Treadwell

## 2017-03-10 NOTE — DISCHARGE INSTRUCTIONS
Lumbar Spinal Surgery Discharge Instructions   Dr. Alanna Rosas  [phone]    Activity:     Your only exercise should be walking. Start with short walks and increase your walking distance each day. Start with walking twice a day for 5 minutes and increase your distance each day 2-3 minutes until you reach 25 minutes twice a day. Limit the amount of time you sit to 20-30 minute intervals. Sitting for prolonged periods of time will be uncomfortable for you following your surgery.  No bending, lifting (of 5lbs or more), twisting, or straining.  Do not drive until your doctor states you may do so. However, you may ride in a car for short distances.  If you are required to wear a brace, you should wear it at all times when you are out of bed. You may remove it when sleeping unless your physician advises you against it.  When you are in the bed, you may lay on your back or on either side. Do not lie on your stomach.  You may resume sexual relations 4-6 weeks after surgery.  Continue to use your incentive spirometer for deep breathing exercises. Driving:   You may not drive or return to work until instructed by your physician. However, you may ride in the car for short periods of time. Brace:   If you have a back brace, you should wear your brace at all times when you are out of bed. Do not wear the brace while in bed or showering.  Remember to always wear a cotton t-shirt underneath your brace.  Do not bend or twist when your brace is off. Diet:   You may resume your regular home diet as tolerated. If your throat is sore, you should eat soft foods for the first couple of days.  Be sure to drink plenty of fluids; it is important to keep yourself hydrated.  Avoid alcoholic beverages and ABSOLUTELY NO tobacco products. Tobacco products will interfere with your healing.  If you continue to use tobacco, you may end up needing another surgery in the future. Dressing: You have on a Prineo dressing. This is a waterproof bacteriostatic dressing that requires no post-operative care. Please do not peel the dressing off, or apply any oil based products, as they may expedite the deterioration of the mesh. The dressing will slowly chip off on its own.  Do not rub or apply lotion or ointments to incision site. Shower:   You may shower 5 days after your surgery.  You may remove your brace during showers.  NO not use tub baths, swimming pools or Jacuzzis. Medications:   Check with your physician before taking any anti-inflammatory medications. (Advil, Aleve, Ibuprofen, Aspirin)   Take your pain medication as directed   Do NOT take additional Tylenol if your prescribed pain medication has acetaminophen in it (Endocet/Percocet, Lortab, Norco)   It is important to have regular bowel movements. Pain medications can be constipating. Stool softeners, warm prune juice, increasing your water intake, and increasing fiber in your diet can help in preventing constipation.  Do NOT take laxatives if at all possible except in severe situations. It can result in a vicious cycle of constipation and diarrhea. Stockings:   You have been given T.E.D. stockings to wear. Continue to wear these for 7 days after your discharge. Put them on in the morning and take them off at night. They are used to increase your circulation and prevent blood clots from forming in your legs. Follow-Up    Please call ASAP to schedule your 1st post-operative appointment. This should be approximately 3 weeks from your surgery date. Notify your physician in you develop any of the following conditions:   Fever above 101 degrees for 24 hours.  Nausea or vomiting.  Severe headache.  Inability to urinate   Loss of bowel or bladder function (sudden onset of incontinence)   Changes in sensation in your extremities (numbness, tingling, loss of color).    Severe pain or pain not relieved by medications.  Redness, swelling, or drainage from your incision.  Persistent pain in the chest.    Pain in the calf of either leg.  Increased weakness (if this is greater than before your surgery). If you have any questions about your dressing contact your Orthopaedic Surgeons office. ** IMPORTANT: YOU HAVE A PRINEO DRESSING OVER YOUR INCISION. THIS IS AN ADHESIVE MESH THAT WAS STERILELY GLUED TO YOUR SKIN. DO NOT REMOVE THIS. NO ONE ELSE SHOULD REMOVE IT EITHER. IT WILL COME OFF ON ITS OWN OVER TIME    * WEAR YOUR BRACE AS ADVISED    * NO DRIVING UNTIL YOU ARE CLEARED TO DO SO BY YOUR SURGEON    * LIMIT LIFTING, BENDING AND TWISTING.  NO LIFTING MORE THAN 5 LBS    * MAKE SURE YOU ARE GETTING GOOD NUTRITION    * FULLY READ YOUR DISCHARGE INSTRUCTIONS

## 2017-03-10 NOTE — PROGRESS NOTES
Formerly [facility], [facility] Infectious Diseases Progress Note  Agustin Mcadams MD,             Saleem Dwyer MD,          Allan Dumont DO     [address] [place] [address]    [address], [address]  [phone]  Fax    3/10/2017      Assessment & Plan:   1. Postoperative wound infection, status post recent L5-S1 microdiskectomy. She is status post incision and drainage of the wound on 2017. Intraoperative cultures growing enterobacter aeruginosa. Of note, this organism has an elevated JANET to ceftazidime. Plan 6-week course with ertapenem 1 gram IV daily through . She will follow up with Infectious Disease in approximately 5 weeks to  determine whether antibiotics may be stopped at 6 weeks or if she will need to be transitioned to an oral antibiotic at that time. IV abx orders in chart. Order written for 1st dose of invanz to be given prior to discharge  2. Thrombocytopenia. Continue to follow. This may be related to cefepime. Better today               Subjective:     Feeling much better today    Objective:     Vitals:   Visit Vitals    /73 (BP 1 Location: Right arm, BP Patient Position: Sitting)    Pulse 90    Temp 97.5 °F (36.4 °C)    Resp 18    Ht 5' 3\" (1.6 m)    Wt 49.4 kg (109 lb)    LMP 2017    SpO2 100%    Breastfeeding No    BMI 19.31 kg/m2        Tmax:  Temp (24hrs), Av.6 °F (36.4 °C), Min:97.5 °F (36.4 °C), Max:97.9 °F (36.6 °C)      Exam:   Patient is intubated:  no    Physical Examination:   GENERAL ASSESSMENT: NAD  HEENT:Nontraumatic NCAT  CHEST: No distress  HEART: No edema  ABDOMEN:   :Prabhakar: no  EXTREMITY: Brace   NEURO:Grossly non focal    Labs:        No lab exists for component: ITNL   No results for input(s): CPK, CKMB, TROIQ in the last 72 hours.   Recent Labs      03/10/17   0154  17   0556  17   0655   NA  138  138  137   K  4.0  3.8  3.5   CL  104  102  101   CO2  30  26  28   BUN  8  6  6   CREA  0.68  0.66  0.79   GLU  108*  86  89   WBC 6.3  6.0  6.2   HGB  11.2*  11.5  11.9   HCT  33.8*  35.3  37.8   PLT  147*  137*  127*     Recent Labs      03/09/17   0556   INR  1.1   PTP  11.0     Needs: urine analysis, urine sodium, protein and creatinine  No results found for: RAISSA, CREAU      Cultures:     No results found for: SDES  Lab Results   Component Value Date/Time    Culture result: NO ANAEROBES ISOLATED 03/07/2017 11:26 AM    Culture result: MODERATE  ENTEROBACTER AEROGENES   03/07/2017 11:26 AM    Culture result: HEAVY  ENTEROBACTER AEROGENES   03/06/2017 12:05 PM    Culture result: NO ANAEROBES ISOLATED 03/06/2017 12:05 PM       Radiology:     Medications       Current Facility-Administered Medications   Medication Dose Route Frequency Last Dose    magnesium hydroxide (MILK OF MAGNESIA) 400 mg/5 mL oral suspension 30 mL  30 mL Oral NOW      bisacodyl (DULCOLAX) suppository 10 mg  10 mg Rectal DAILY PRN      OTHER(NON-FORMULARY) 1 g  1 g IntraVENous ONCE      alteplase (CATHFLO) 1 mg in sterile water (preservative free) 1 mL injection  1 mg InterCATHeter PRN      sodium chloride (NS) flush 10-30 mL  10-30 mL InterCATHeter PRN      sodium chloride (NS) flush 10 mL  10 mL InterCATHeter Q24H      sodium chloride (NS) flush 10 mL  10 mL InterCATHeter PRN      sodium chloride (NS) flush 10-40 mL  10-40 mL InterCATHeter Q8H 10 mL at 03/09/17 2200    sodium chloride (NS) flush 20 mL  20 mL InterCATHeter Q24H      cyclobenzaprine (FLEXERIL) tablet 10 mg  10 mg Oral TID PRN 10 mg at 03/09/17 0605    sodium chloride (NS) flush 5-10 mL  5-10 mL IntraVENous Q8H 10 mL at 03/09/17 0608    sodium chloride (NS) flush 5-10 mL  5-10 mL IntraVENous PRN      diazePAM (VALIUM) tablet 5 mg  5 mg Oral Q6H PRN 5 mg at 03/08/17 1405    acetaminophen (TYLENOL) tablet 650 mg  650 mg Oral Q4H PRN      oxyCODONE-acetaminophen (PERCOCET 10)  mg per tablet 1 Tab  1 Tab Oral Q4H PRN 1 Tab at 03/10/17 0653    HYDROmorphone (PF) (DILAUDID) injection 1 mg  1 mg IntraVENous Q4H PRN      naloxone (NARCAN) injection 0.4 mg  0.4 mg IntraVENous PRN      ondansetron (ZOFRAN) injection 4 mg  4 mg IntraVENous Q4H PRN      diphenhydrAMINE (BENADRYL) capsule 25 mg  25 mg Oral Q4H PRN      docusate sodium (COLACE) capsule 100 mg  100 mg Oral  mg at 03/09/17 1847    zolpidem (AMBIEN) tablet 5 mg  5 mg Oral QHS PRN      acetaminophen (TYLENOL) tablet 650 mg  650 mg Oral Q4H PRN      famotidine (PEPCID) tablet 20 mg  20 mg Oral Q12H 20 mg at 03/09/17 2040           Case discussed with:CM,  at [address], DO

## 2017-03-10 NOTE — PROGRESS NOTES
CM note:    New order for home health PT noted and sent to [facility]. All dc info and IV abx orders sent to [facility]. Plan for pt to get first dose of invanz here & dc home and have infusion teaching at home tmrw. Thanks.   Merrill Montenegro LCSW

## 2017-03-10 NOTE — PROGRESS NOTES
Bedside shift change report given to Hilario Slater RN (oncoming nurse) by Lesli Quiñonez RN (offgoing nurse). Report included the following information SBAR, Kardex, Procedure Summary, Intake/Output, MAR and Recent Results.

## 2017-03-11 ENCOUNTER — HOME CARE VISIT (OUTPATIENT)
Dept: SCHEDULING | Facility: HOME HEALTH | Age: 32
End: 2017-03-11
Payer: COMMERCIAL

## 2017-03-11 VITALS
OXYGEN SATURATION: 97 % | TEMPERATURE: 98.7 F | HEART RATE: 87 BPM | BODY MASS INDEX: 19.84 KG/M2 | RESPIRATION RATE: 16 BRPM | DIASTOLIC BLOOD PRESSURE: 78 MMHG | WEIGHT: 112 LBS | SYSTOLIC BLOOD PRESSURE: 108 MMHG | HEIGHT: 63 IN

## 2017-03-11 VITALS
RESPIRATION RATE: 16 BRPM | HEART RATE: 87 BPM | OXYGEN SATURATION: 97 % | DIASTOLIC BLOOD PRESSURE: 78 MMHG | SYSTOLIC BLOOD PRESSURE: 108 MMHG | TEMPERATURE: 98.7 F

## 2017-03-11 PROCEDURE — G0299 HHS/HOSPICE OF RN EA 15 MIN: HCPCS

## 2017-03-11 PROCEDURE — G0151 HHCP-SERV OF PT,EA 15 MIN: HCPCS

## 2017-03-12 ENCOUNTER — HOME CARE VISIT (OUTPATIENT)
Dept: SCHEDULING | Facility: HOME HEALTH | Age: 32
End: 2017-03-12
Payer: COMMERCIAL

## 2017-03-12 PROCEDURE — G0299 HHS/HOSPICE OF RN EA 15 MIN: HCPCS

## 2017-03-13 ENCOUNTER — HOME CARE VISIT (OUTPATIENT)
Dept: HOME HEALTH SERVICES | Facility: HOME HEALTH | Age: 32
End: 2017-03-13
Payer: COMMERCIAL

## 2017-03-13 ENCOUNTER — HOME CARE VISIT (OUTPATIENT)
Dept: SCHEDULING | Facility: HOME HEALTH | Age: 32
End: 2017-03-13
Payer: COMMERCIAL

## 2017-03-13 VITALS
SYSTOLIC BLOOD PRESSURE: 120 MMHG | HEART RATE: 70 BPM | TEMPERATURE: 98.2 F | DIASTOLIC BLOOD PRESSURE: 70 MMHG | OXYGEN SATURATION: 98 % | RESPIRATION RATE: 18 BRPM

## 2017-03-13 VITALS
SYSTOLIC BLOOD PRESSURE: 112 MMHG | RESPIRATION RATE: 18 BRPM | OXYGEN SATURATION: 99 % | HEART RATE: 91 BPM | TEMPERATURE: 99.4 F | DIASTOLIC BLOOD PRESSURE: 74 MMHG

## 2017-03-13 PROCEDURE — G0157 HHC PT ASSISTANT EA 15: HCPCS

## 2017-03-13 PROCEDURE — G0299 HHS/HOSPICE OF RN EA 15 MIN: HCPCS

## 2017-03-13 RX ADMIN — Medication 10 ML: at 14:30

## 2017-03-14 VITALS
DIASTOLIC BLOOD PRESSURE: 64 MMHG | SYSTOLIC BLOOD PRESSURE: 102 MMHG | HEART RATE: 102 BPM | RESPIRATION RATE: 17 BRPM | OXYGEN SATURATION: 98 %

## 2017-03-16 ENCOUNTER — HOME CARE VISIT (OUTPATIENT)
Dept: SCHEDULING | Facility: HOME HEALTH | Age: 32
End: 2017-03-16
Payer: COMMERCIAL

## 2017-03-16 ENCOUNTER — HOME CARE VISIT (OUTPATIENT)
Dept: HOME HEALTH SERVICES | Facility: HOME HEALTH | Age: 32
End: 2017-03-16
Payer: COMMERCIAL

## 2017-03-16 VITALS
HEART RATE: 96 BPM | RESPIRATION RATE: 16 BRPM | SYSTOLIC BLOOD PRESSURE: 110 MMHG | TEMPERATURE: 97.3 F | OXYGEN SATURATION: 95 % | DIASTOLIC BLOOD PRESSURE: 62 MMHG

## 2017-03-16 PROCEDURE — G0157 HHC PT ASSISTANT EA 15: HCPCS

## 2017-03-16 PROCEDURE — G0299 HHS/HOSPICE OF RN EA 15 MIN: HCPCS

## 2017-03-17 ENCOUNTER — HOME CARE VISIT (OUTPATIENT)
Dept: SCHEDULING | Facility: HOME HEALTH | Age: 32
End: 2017-03-17
Payer: COMMERCIAL

## 2017-03-17 PROCEDURE — G0151 HHCP-SERV OF PT,EA 15 MIN: HCPCS

## 2017-03-18 VITALS
DIASTOLIC BLOOD PRESSURE: 76 MMHG | SYSTOLIC BLOOD PRESSURE: 106 MMHG | HEART RATE: 87 BPM | RESPIRATION RATE: 18 BRPM | OXYGEN SATURATION: 97 % | TEMPERATURE: 99.3 F

## 2017-03-20 ENCOUNTER — HOME CARE VISIT (OUTPATIENT)
Dept: SCHEDULING | Facility: HOME HEALTH | Age: 32
End: 2017-03-20
Payer: COMMERCIAL

## 2017-03-20 VITALS
OXYGEN SATURATION: 98 % | RESPIRATION RATE: 18 BRPM | SYSTOLIC BLOOD PRESSURE: 108 MMHG | TEMPERATURE: 98.8 F | HEART RATE: 94 BPM | DIASTOLIC BLOOD PRESSURE: 84 MMHG

## 2017-03-20 VITALS
OXYGEN SATURATION: 98 % | SYSTOLIC BLOOD PRESSURE: 104 MMHG | RESPIRATION RATE: 18 BRPM | DIASTOLIC BLOOD PRESSURE: 66 MMHG | HEART RATE: 68 BPM | TEMPERATURE: 98.9 F

## 2017-03-20 PROCEDURE — G0299 HHS/HOSPICE OF RN EA 15 MIN: HCPCS

## 2017-03-21 ENCOUNTER — HOME CARE VISIT (OUTPATIENT)
Dept: HOME HEALTH SERVICES | Facility: HOME HEALTH | Age: 32
End: 2017-03-21
Payer: COMMERCIAL

## 2017-03-21 ENCOUNTER — APPOINTMENT (OUTPATIENT)
Dept: MRI IMAGING | Age: 32
DRG: 856 | End: 2017-03-21
Attending: NURSE PRACTITIONER
Payer: COMMERCIAL

## 2017-03-21 ENCOUNTER — HOSPITAL ENCOUNTER (INPATIENT)
Age: 32
LOS: 14 days | Discharge: HOME HEALTH | DRG: 856 | End: 2017-04-04
Attending: EMERGENCY MEDICINE | Admitting: ORTHOPAEDIC SURGERY
Payer: COMMERCIAL

## 2017-03-21 DIAGNOSIS — K59.03 DRUG-INDUCED CONSTIPATION: ICD-10-CM

## 2017-03-21 DIAGNOSIS — G06.1 ABSCESS IN EPIDURAL SPACE OF LUMBAR SPINE: Primary | ICD-10-CM

## 2017-03-21 DIAGNOSIS — M54.50 ACUTE BILATERAL LOW BACK PAIN WITHOUT SCIATICA: ICD-10-CM

## 2017-03-21 DIAGNOSIS — R53.81 DEBILITY: ICD-10-CM

## 2017-03-21 DIAGNOSIS — M46.26 OSTEOMYELITIS OF LUMBAR VERTEBRA (HCC): ICD-10-CM

## 2017-03-21 DIAGNOSIS — M46.46 LUMBAR DISCITIS: ICD-10-CM

## 2017-03-21 PROBLEM — T81.40XA POST-OPERATIVE INFECTION: Status: ACTIVE | Noted: 2017-03-21

## 2017-03-21 LAB
ALBUMIN SERPL BCP-MCNC: 3 G/DL (ref 3.5–5)
ALBUMIN/GLOB SERPL: 0.6 {RATIO} (ref 1.1–2.2)
ALP SERPL-CCNC: 112 U/L (ref 45–117)
ALT SERPL-CCNC: 40 U/L (ref 12–78)
ANION GAP BLD CALC-SCNC: 10 MMOL/L (ref 5–15)
AST SERPL W P-5'-P-CCNC: 21 U/L (ref 15–37)
BASOPHILS # BLD AUTO: 0 K/UL (ref 0–0.1)
BASOPHILS # BLD: 1 % (ref 0–1)
BILIRUB SERPL-MCNC: 0.2 MG/DL (ref 0.2–1)
BUN SERPL-MCNC: 16 MG/DL (ref 6–20)
BUN/CREAT SERPL: 23 (ref 12–20)
CALCIUM SERPL-MCNC: 9.2 MG/DL (ref 8.5–10.1)
CHLORIDE SERPL-SCNC: 99 MMOL/L (ref 97–108)
CO2 SERPL-SCNC: 30 MMOL/L (ref 21–32)
CREAT SERPL-MCNC: 0.71 MG/DL (ref 0.55–1.02)
CRP SERPL-MCNC: 5.12 MG/DL
DIFFERENTIAL METHOD BLD: ABNORMAL
EOSINOPHIL # BLD: 0.1 K/UL (ref 0–0.4)
EOSINOPHIL NFR BLD: 2 % (ref 0–7)
ERYTHROCYTE [DISTWIDTH] IN BLOOD BY AUTOMATED COUNT: 12.2 % (ref 11.5–14.5)
ERYTHROCYTE [SEDIMENTATION RATE] IN BLOOD: 90 MM/HR (ref 0–20)
GLOBULIN SER CALC-MCNC: 5 G/DL (ref 2–4)
GLUCOSE SERPL-MCNC: 85 MG/DL (ref 65–100)
HCG UR QL: NEGATIVE
HCT VFR BLD AUTO: 34.8 % (ref 35–47)
HGB BLD-MCNC: 11.6 G/DL (ref 11.5–16)
LACTATE SERPL-SCNC: 0.8 MMOL/L (ref 0.4–2)
LYMPHOCYTES # BLD AUTO: 29 % (ref 12–49)
LYMPHOCYTES # BLD: 1.8 K/UL (ref 0.8–3.5)
MCH RBC QN AUTO: 31.4 PG (ref 26–34)
MCHC RBC AUTO-ENTMCNC: 33.3 G/DL (ref 30–36.5)
MCV RBC AUTO: 94.3 FL (ref 80–99)
MONOCYTES # BLD: 0.7 K/UL (ref 0–1)
MONOCYTES NFR BLD AUTO: 10 % (ref 5–13)
NEUTS SEG # BLD: 3.8 K/UL (ref 1.8–8)
NEUTS SEG NFR BLD AUTO: 58 % (ref 32–75)
PLATELET # BLD AUTO: 503 K/UL (ref 150–400)
POTASSIUM SERPL-SCNC: 4.2 MMOL/L (ref 3.5–5.1)
PROT SERPL-MCNC: 8 G/DL (ref 6.4–8.2)
RBC # BLD AUTO: 3.69 M/UL (ref 3.8–5.2)
SODIUM SERPL-SCNC: 139 MMOL/L (ref 136–145)
WBC # BLD AUTO: 6.4 K/UL (ref 3.6–11)

## 2017-03-21 PROCEDURE — 87040 BLOOD CULTURE FOR BACTERIA: CPT | Performed by: EMERGENCY MEDICINE

## 2017-03-21 PROCEDURE — 74011250636 HC RX REV CODE- 250/636: Performed by: EMERGENCY MEDICINE

## 2017-03-21 PROCEDURE — A9585 GADOBUTROL INJECTION: HCPCS | Performed by: EMERGENCY MEDICINE

## 2017-03-21 PROCEDURE — 85652 RBC SED RATE AUTOMATED: CPT | Performed by: NURSE PRACTITIONER

## 2017-03-21 PROCEDURE — 96376 TX/PRO/DX INJ SAME DRUG ADON: CPT

## 2017-03-21 PROCEDURE — 65270000029 HC RM PRIVATE

## 2017-03-21 PROCEDURE — 83605 ASSAY OF LACTIC ACID: CPT | Performed by: EMERGENCY MEDICINE

## 2017-03-21 PROCEDURE — 96374 THER/PROPH/DIAG INJ IV PUSH: CPT

## 2017-03-21 PROCEDURE — 36415 COLL VENOUS BLD VENIPUNCTURE: CPT | Performed by: NURSE PRACTITIONER

## 2017-03-21 PROCEDURE — 72158 MRI LUMBAR SPINE W/O & W/DYE: CPT

## 2017-03-21 PROCEDURE — 74011250636 HC RX REV CODE- 250/636: Performed by: NURSE PRACTITIONER

## 2017-03-21 PROCEDURE — 99285 EMERGENCY DEPT VISIT HI MDM: CPT

## 2017-03-21 PROCEDURE — 81025 URINE PREGNANCY TEST: CPT

## 2017-03-21 PROCEDURE — 80053 COMPREHEN METABOLIC PANEL: CPT | Performed by: NURSE PRACTITIONER

## 2017-03-21 PROCEDURE — 86140 C-REACTIVE PROTEIN: CPT | Performed by: NURSE PRACTITIONER

## 2017-03-21 PROCEDURE — 85025 COMPLETE CBC W/AUTO DIFF WBC: CPT | Performed by: NURSE PRACTITIONER

## 2017-03-21 RX ORDER — AMOXICILLIN 250 MG
1 CAPSULE ORAL 2 TIMES DAILY
Status: DISCONTINUED | OUTPATIENT
Start: 2017-03-22 | End: 2017-03-24

## 2017-03-21 RX ORDER — DIAZEPAM 10 MG/2ML
5 INJECTION INTRAMUSCULAR ONCE
Status: COMPLETED | OUTPATIENT
Start: 2017-03-21 | End: 2017-03-21

## 2017-03-21 RX ORDER — HYDROMORPHONE HYDROCHLORIDE 2 MG/1
2 TABLET ORAL
COMMUNITY
End: 2017-04-04

## 2017-03-21 RX ORDER — ACETAMINOPHEN 325 MG/1
650 TABLET ORAL
Status: DISCONTINUED | OUTPATIENT
Start: 2017-03-21 | End: 2017-04-04 | Stop reason: HOSPADM

## 2017-03-21 RX ORDER — DIPHENHYDRAMINE HYDROCHLORIDE 50 MG/ML
12.5 INJECTION, SOLUTION INTRAMUSCULAR; INTRAVENOUS
Status: DISCONTINUED | OUTPATIENT
Start: 2017-03-21 | End: 2017-04-04 | Stop reason: HOSPADM

## 2017-03-21 RX ORDER — NALOXONE HYDROCHLORIDE 0.4 MG/ML
0.4 INJECTION, SOLUTION INTRAMUSCULAR; INTRAVENOUS; SUBCUTANEOUS AS NEEDED
Status: DISCONTINUED | OUTPATIENT
Start: 2017-03-21 | End: 2017-04-04 | Stop reason: HOSPADM

## 2017-03-21 RX ORDER — HYDROMORPHONE HYDROCHLORIDE 1 MG/ML
1 INJECTION, SOLUTION INTRAMUSCULAR; INTRAVENOUS; SUBCUTANEOUS ONCE
Status: COMPLETED | OUTPATIENT
Start: 2017-03-21 | End: 2017-03-21

## 2017-03-21 RX ORDER — HYDROMORPHONE HYDROCHLORIDE 1 MG/ML
1 INJECTION, SOLUTION INTRAMUSCULAR; INTRAVENOUS; SUBCUTANEOUS
Status: DISCONTINUED | OUTPATIENT
Start: 2017-03-21 | End: 2017-03-24

## 2017-03-21 RX ORDER — DIAZEPAM 5 MG/1
5 TABLET ORAL
COMMUNITY
End: 2017-04-04

## 2017-03-21 RX ORDER — SODIUM CHLORIDE 0.9 % (FLUSH) 0.9 %
5-10 SYRINGE (ML) INJECTION DAILY
Status: DISCONTINUED | OUTPATIENT
Start: 2017-03-22 | End: 2017-03-25

## 2017-03-21 RX ORDER — OXYCODONE AND ACETAMINOPHEN 10; 325 MG/1; MG/1
1 TABLET ORAL
Status: DISCONTINUED | OUTPATIENT
Start: 2017-03-21 | End: 2017-04-04 | Stop reason: HOSPADM

## 2017-03-21 RX ORDER — OXYCODONE AND ACETAMINOPHEN 5; 325 MG/1; MG/1
1 TABLET ORAL
Status: DISCONTINUED | OUTPATIENT
Start: 2017-03-21 | End: 2017-04-04 | Stop reason: HOSPADM

## 2017-03-21 RX ORDER — SODIUM CHLORIDE 0.9 % (FLUSH) 0.9 %
5-10 SYRINGE (ML) INJECTION EVERY 8 HOURS
Status: DISCONTINUED | OUTPATIENT
Start: 2017-03-22 | End: 2017-04-04 | Stop reason: HOSPADM

## 2017-03-21 RX ORDER — PROMETHAZINE HYDROCHLORIDE 25 MG/1
25 TABLET ORAL
Status: DISCONTINUED | OUTPATIENT
Start: 2017-03-21 | End: 2017-04-04 | Stop reason: HOSPADM

## 2017-03-21 RX ORDER — SODIUM CHLORIDE 9 MG/ML
100 INJECTION, SOLUTION INTRAVENOUS CONTINUOUS
Status: DISCONTINUED | OUTPATIENT
Start: 2017-03-22 | End: 2017-04-04 | Stop reason: HOSPADM

## 2017-03-21 RX ORDER — SODIUM CHLORIDE 0.9 % (FLUSH) 0.9 %
5-10 SYRINGE (ML) INJECTION AS NEEDED
Status: DISCONTINUED | OUTPATIENT
Start: 2017-03-21 | End: 2017-04-04 | Stop reason: HOSPADM

## 2017-03-21 RX ORDER — DIAZEPAM 10 MG/2ML
5 INJECTION INTRAMUSCULAR
Status: DISCONTINUED | OUTPATIENT
Start: 2017-03-21 | End: 2017-03-22

## 2017-03-21 RX ADMIN — GADOBUTROL 5 ML: 604.72 INJECTION INTRAVENOUS at 21:56

## 2017-03-21 RX ADMIN — SODIUM CHLORIDE 1000 ML: 900 INJECTION, SOLUTION INTRAVENOUS at 18:48

## 2017-03-21 RX ADMIN — HYDROMORPHONE HYDROCHLORIDE 1 MG: 1 INJECTION, SOLUTION INTRAMUSCULAR; INTRAVENOUS; SUBCUTANEOUS at 22:15

## 2017-03-21 RX ADMIN — DIAZEPAM 5 MG: 5 INJECTION, SOLUTION INTRAMUSCULAR; INTRAVENOUS at 20:46

## 2017-03-21 RX ADMIN — HYDROMORPHONE HYDROCHLORIDE 1 MG: 1 INJECTION, SOLUTION INTRAMUSCULAR; INTRAVENOUS; SUBCUTANEOUS at 16:41

## 2017-03-21 RX ADMIN — HYDROMORPHONE HYDROCHLORIDE 1 MG: 1 INJECTION, SOLUTION INTRAMUSCULAR; INTRAVENOUS; SUBCUTANEOUS at 18:44

## 2017-03-21 RX ADMIN — SODIUM CHLORIDE 1250 MG: 900 INJECTION, SOLUTION INTRAVENOUS at 19:35

## 2017-03-21 RX ADMIN — SODIUM CHLORIDE 1000 ML: 900 INJECTION, SOLUTION INTRAVENOUS at 18:46

## 2017-03-21 NOTE — IP AVS SNAPSHOT
Bisi Willis 
 
 
 [address] 
[phone] Patient: Serenity Harvey MRN: [id number] :1985 You are allergic to the following No active allergies Recent Documentation Height Weight BMI OB Status Smoking Status 1.6 m 49.9 kg 19.49 kg/m2 Having regular periods Never Smoker Unresulted Labs Order Current Status CULTURE, FUNGUS Preliminary result Emergency Contacts Name Discharge Info Relation Home Work Mobile [employer] DISCHARGE CAREGIVER [3] Spouse [3] [phone] About your hospitalization You were admitted on:  2017 You last received care in the:  [facility] 4M POST SURG ORT 1 You were discharged on:  2017 Unit phone number:  [phone] Why you were hospitalized Your primary diagnosis was:  Not on File Your diagnoses also included:  Post-Operative Infection Providers Seen During Your Hospitalizations Provider Role Specialty Primary office phone Sudhir Guerrero DO Attending Provider Emergency Medicine [phone] Colin Escobar MD Attending Provider Orthopedic Surgery [phone] Your Primary Care Physician (PCP) Primary Care Physician Office Phone Office Fax Pamela Navarretelon [phone] Follow-up Information Follow up With Details Comments Contact Info  
 please returne patient own medication (Junel Fe) located pyxis patient specific bin to patient at discharge Ivy Valdez, [address] 
[phone] [address] [place] [address] [phone] [facility]  They will supply your iv antibiotics. [facility] [address] 
[phone] Current Discharge Medication List  
  
START taking these medications Dose & Instructions Dispensing Information Comments Morning Noon Evening Bedtime  
 baclofen 10 mg tablet Commonly known as:  LIORESAL Your last dose was: Your next dose is:    
   
   
 Dose:  10 mg Take 1 Tab by mouth three (3) times daily. Quantity:  30 Tab Refills:  0  
     
   
   
   
  
 cefepime 2 gram 2 g, ADDaptor 1 Device IVPB Your last dose was: Your next dose is:    
   
   
 Dose:  2 g  
2 g by IntraVENous route every eight (8) hours for 30 days. Quantity:  1 Dose Refills:  0  
     
   
   
   
  
 ciprofloxacin HCl 500 mg tablet Commonly known as:  CIPRO Your last dose was: Your next dose is:    
   
   
 Dose:  500 mg Take 1 Tab by mouth every twelve (12) hours for 30 days. Quantity:  60 Tab Refills:  0  
     
   
   
   
  
 diclofenac EC 75 mg EC tablet Commonly known as:  VOLTAREN Your last dose was: Your next dose is:    
   
   
 Dose:  75 mg Take 1 Tab by mouth two (2) times a day. Quantity:  30 Tab Refills:  0  
     
   
   
   
  
 oxyCODONE-acetaminophen 5-325 mg per tablet Commonly known as:  PERCOCET Your last dose was: Your next dose is:    
   
   
 Dose:  1-2 Tab Take 1-2 Tabs by mouth every four (4) hours as needed. Max Daily Amount: 12 Tabs. Quantity:  80 Tab Refills:  0 CONTINUE these medications which have NOT CHANGED Dose & Instructions Dispensing Information Comments Morning Noon Evening Bedtime  
 docusate sodium 100 mg capsule Commonly known as:  Nima [place] Your last dose was: Your next dose is:    
   
   
 Dose:  100 mg Take 1 Cap by mouth two (2) times a day. Quantity:  60 Cap Refills:  2 JUNEL FE 1/20 (28) PO Your last dose was: Your next dose is:    
   
   
 Dose:  1 Tab Take 1 Tab by mouth daily. Refills:  0  
     
   
   
   
  
 multivitamin tablet Commonly known as:  ONE A DAY Your last dose was: Your next dose is:    
   
   
 Dose:  1 Tab Take 1 Tab by mouth daily. Refills:  0  
     
   
   
   
  
 NORMAL SALINE FLUSH 0.9 % Generic drug:  sodium chloride Your last dose was: Your next dose is:    
   
   
 Dose:  5-10 mL  
5-10 mL by IntraVENous route daily. Refills:  0  
     
   
   
   
  
 promethazine 25 mg tablet Commonly known as:  PHENERGAN Your last dose was: Your next dose is:    
   
   
 Dose:  25 mg Take 1 Tab by mouth every six (6) hours as needed for Nausea. Quantity:  60 Tab Refills:  2 Senna 8.6 mg tablet Generic drug:  senna Your last dose was: Your next dose is:    
   
   
 Dose:  2 Tab Take 2 Tabs by mouth two (2) times a day. Refills:  0 STOP taking these medications   
 cyclobenzaprine 10 mg tablet Commonly known as:  FLEXERIL  
   
  
 HEPARIN FLUSH IV  
   
  
 HYDROmorphone 2 mg tablet Commonly known as:  DILAUDID INVanz 1 gram Solr injection Generic drug:  ertapenem VALIUM 5 mg tablet Generic drug:  diazePAM  
   
  
  
  
Where to Get Your Medications Information on where to get these meds will be given to you by the nurse or doctor. ! Ask your nurse or doctor about these medications  
  baclofen 10 mg tablet  
 cefepime 2 gram 2 g, ADDaptor 1 Device IVPB  
 ciprofloxacin HCl 500 mg tablet  
 diclofenac EC 75 mg EC tablet  
 oxyCODONE-acetaminophen 5-325 mg per tablet Discharge Instructions Lumbar Spinal Surgery Discharge Instructions Dr. Susana Naidu [address] [place] [address] [phone] Activity:   
? You are going home a well person, be as active as possible. Your only exercise should be walking. Start with short frequent walks and increase your walking distance each day.  Start with walking twice a day for 5 minutes and increase your distance each day 2-3 minutes until you reach 25 minutes twice a day. Limit the amount of time you sit to 20-30 minute intervals. Sitting for prolonged periods of time will be uncomfortable for you following your surgery. ? No bending, lifting (of 5lbs or more), twisting, or straining. ? Do not drive until your doctor states you may do so. However, you may ride in a car for short distances. ? If you are required to wear a brace, you should wear it at all times when you are out of bed. You may remove it when sleeping unless your physician advises you against it. ? When you are in the bed, you may lay on your back or on either side. Do not lie on your stomach. ? You may resume sexual relations 4-6 weeks after surgery. ? Continue to use your incentive spirometer for deep breathing exercises. Driving: ? You may not drive or return to work until instructed by your physician. However, you may ride in the car for short periods of time. Brace: ? If you have a back brace, you should wear your brace at all times when you are out of bed. Do not wear the brace while in bed or showering. ? Remember to always wear a cotton t-shirt underneath your brace. ? Do not bend or twist when your brace is off. Diet: 
? You may resume your regular home diet as tolerated. If your throat is sore, you should eat soft foods for the first couple of days. ? Be sure to drink plenty of fluids; it is important to keep yourself hydrated. ? Avoid alcoholic beverages and ABSOLUTELY NO tobacco products. Tobacco products will interfere with your healing. If you continue to use tobacco, you may end up needing another surgery in the future. Dressing: You have on a Prineo dressing. This is a waterproof bacteriostatic dressing that requires no post-operative care.  Please do not peel the dressing off, or apply any oil based products, as they may expedite the deterioration of the mesh. The dressing will slowly chip off on its own. 
? Do not rub or apply lotion or ointments to incision site. Shower: ? You may shower 5 days after your surgery. ? You may remove your brace during showers. ? NO not use tub baths, swimming pools or Jacuzzis. Medications: 
? Check with your physician before taking any anti-inflammatory medications. (Advil, Aleve, Ibuprofen, Aspirin) ? Take your pain medication as directed ? Do NOT take additional Tylenol if your prescribed pain medication has acetaminophen in it (Endocet/Percocet, Lortab, Norco) ? It is important to have regular bowel movements. Pain medications can be constipating. Stool softeners, warm prune juice, increasing your water intake, and increasing fiber in your diet can help in preventing constipation. ? Do NOT take laxatives if at all possible except in severe situations. It can result in a vicious cycle of constipation and diarrhea. Stockings: ? You have been given T.E.D. stockings to wear. Continue to wear these for 7 days after your discharge. Put them on in the morning and take them off at night. They are used to increase your circulation and prevent blood clots from forming in your legs. Follow-Up ? Please call ASAP to schedule your 1st post-operative appointment. This should be approximately 3 weeks from your surgery date. Notify your physician in you develop any of the following conditions: 
? Fever above 101 degrees for 24 hours. ? Nausea or vomiting. ? Severe headache. 
? Inability to urinate ? Loss of bowel or bladder function (sudden onset of incontinence) ? Changes in sensation in your extremities (numbness, tingling, loss of color). ? Severe pain or pain not relieved by medications. ? Redness, swelling, or drainage from your incision. ? Persistent pain in the chest.  
? Pain in the calf of either leg. 
? Increased weakness (if this is greater than before your surgery). If you have any questions about your dressing contact your Orthopaedic Surgeons office. Discharge Orders None Introducing [facility]! [employer] introduces [employer] patient portal. Now you can access parts of your medical record, email your doctor's office, and request medication refills online. 1. In your internet browser, go to [email or web address] 2. Click on the First Time User? Click Here link in the Sign In box. You will see the New Member Sign Up page. 3. Enter your [employer] Access Code exactly as it appears below. You will not need to use this code after youve completed the sign-up process. If you do not sign up before the expiration date, you must request a new code. · [employer] Access Code: [email or web address] Expires: 5/4/2017 12:12 PM 
 
4. Enter the last four digits of your Social Security Number (xxxx) and Date of Birth (mm/dd/yyyy) as indicated and click Submit. You will be taken to the next sign-up page. 5. Create a [employer] ID. This will be your [employer] login ID and cannot be changed, so think of one that is secure and easy to remember. 6. Create a [employer] password. You can change your password at any time. 7. Enter your Password Reset Question and Answer. This can be used at a later time if you forget your password. 8. Enter your e-mail address. You will receive e-mail notification when new information is available in [address] 9. Click Sign Up. You can now view and download portions of your medical record. 10. Click the Download Summary menu link to download a portable copy of your medical information. If you have questions, please visit the Frequently Asked Questions section of the [employer] website. Remember, [employer] is NOT to be used for urgent needs. For medical emergencies, dial 911. Now available from your iPhone and Android! General Information Please provide this summary of care documentation to your next provider. Patient Signature:  ____________________________________________________________ Date:  ____________________________________________________________  
  
[employer] Mates Provider Signature:  ____________________________________________________________ Date:  ____________________________________________________________

## 2017-03-21 NOTE — ED NOTES
Pt was assisted to bedside commode, back spasms increased with movement. Pt medicated with 2nd dose Dilauded as ordered.

## 2017-03-21 NOTE — ED TRIAGE NOTES
[facility] \"I have a HX of back problems with infection following  Surgery 2/6/17. I had a second surgery to clear the infection 3/7/17. I have had back spasms but they became worse today. \"

## 2017-03-21 NOTE — PROGRESS NOTES
[facility] Dosing Services: Antimicrobial Stewardship Progress Note    Consult for antibiotic dosing of Vancomycin by Dr. Gumaro Almendarez   Pharmacist reviewed antibiotic appropriateness for 33year old , female  for indication of Bone/Joint Infection, Surgical Site Infection  Day of Therapy  - 1    Plan:  Vancomycin:  Loading dose: 1250 mg IV x1  Maintenance dose: 750 mg IV Q8h  Goal Trough: 15 to 20 mcg/ml for now   - Check trough before the 4th dose  - Monitor renal function. Scr is ordered daily while on Vancomycin      Other Antimicrobial  (not dosed by pharmacist)   None   Cultures        Serum Creatinine     Lab Results   Component Value Date/Time    Creatinine 0.71 03/21/2017 04:26 PM       Creatinine Clearance Estimated Creatinine Clearance: 90.4 mL/min (based on Cr of 0.71).      Temp   98.1 °F (36.7 °C)    WBC   Lab Results   Component Value Date/Time    WBC 6.4 03/21/2017 04:26 PM       H/H   Lab Results   Component Value Date/Time    HGB 11.6 03/21/2017 04:26 PM        Platelets   Lab Results   Component Value Date/Time    PLATELET 352 33/44/0525 04:26 PM          Pharmacist: Melissa Camarillo PHARMD Contact information: [phone]

## 2017-03-21 NOTE — IP AVS SNAPSHOT
Current Discharge Medication List  
  
START taking these medications Dose & Instructions Dispensing Information Comments Morning Noon Evening Bedtime  
 baclofen 10 mg tablet Commonly known as:  LIORESAL Your last dose was: Your next dose is:    
   
   
 Dose:  10 mg Take 1 Tab by mouth three (3) times daily. Quantity:  30 Tab Refills:  0  
     
   
   
   
  
 cefepime 2 gram 2 g, ADDaptor 1 Device IVPB Your last dose was: Your next dose is:    
   
   
 Dose:  2 g  
2 g by IntraVENous route every eight (8) hours for 30 days. Quantity:  1 Dose Refills:  0  
     
   
   
   
  
 ciprofloxacin HCl 500 mg tablet Commonly known as:  CIPRO Your last dose was: Your next dose is:    
   
   
 Dose:  500 mg Take 1 Tab by mouth every twelve (12) hours for 30 days. Quantity:  60 Tab Refills:  0  
     
   
   
   
  
 diclofenac EC 75 mg EC tablet Commonly known as:  VOLTAREN Your last dose was: Your next dose is:    
   
   
 Dose:  75 mg Take 1 Tab by mouth two (2) times a day. Quantity:  30 Tab Refills:  0  
     
   
   
   
  
 oxyCODONE-acetaminophen 5-325 mg per tablet Commonly known as:  PERCOCET Your last dose was: Your next dose is:    
   
   
 Dose:  1-2 Tab Take 1-2 Tabs by mouth every four (4) hours as needed. Max Daily Amount: 12 Tabs. Quantity:  80 Tab Refills:  0 CONTINUE these medications which have NOT CHANGED Dose & Instructions Dispensing Information Comments Morning Noon Evening Bedtime  
 docusate sodium 100 mg capsule Commonly known as:  Dorie Gaw Your last dose was: Your next dose is:    
   
   
 Dose:  100 mg Take 1 Cap by mouth two (2) times a day. Quantity:  60 Cap Refills:  2 JUNEL FE 1/20 (28) PO Your last dose was: Your next dose is:    
   
   
 Dose:  1 Tab Take 1 Tab by mouth daily. Refills:  0  
     
   
   
   
  
 multivitamin tablet Commonly known as:  ONE A DAY Your last dose was: Your next dose is:    
   
   
 Dose:  1 Tab Take 1 Tab by mouth daily. Refills:  0  
     
   
   
   
  
 NORMAL SALINE FLUSH 0.9 % Generic drug:  sodium chloride Your last dose was: Your next dose is:    
   
   
 Dose:  5-10 mL  
5-10 mL by IntraVENous route daily. Refills:  0  
     
   
   
   
  
 promethazine 25 mg tablet Commonly known as:  PHENERGAN Your last dose was: Your next dose is:    
   
   
 Dose:  25 mg Take 1 Tab by mouth every six (6) hours as needed for Nausea. Quantity:  60 Tab Refills:  2 Senna 8.6 mg tablet Generic drug:  senna Your last dose was: Your next dose is:    
   
   
 Dose:  2 Tab Take 2 Tabs by mouth two (2) times a day. Refills:  0 STOP taking these medications   
 cyclobenzaprine 10 mg tablet Commonly known as:  FLEXERIL  
   
  
 HEPARIN FLUSH IV  
   
  
 HYDROmorphone 2 mg tablet Commonly known as:  DILAUDID INVanz 1 gram Solr injection Generic drug:  ertapenem VALIUM 5 mg tablet Generic drug:  diazePAM  
   
  
  
  
Where to Get Your Medications Information on where to get these meds will be given to you by the nurse or doctor. ! Ask your nurse or doctor about these medications  
  baclofen 10 mg tablet  
 cefepime 2 gram 2 g, ADDaptor 1 Device IVPB  
 ciprofloxacin HCl 500 mg tablet  
 diclofenac EC 75 mg EC tablet  
 oxyCODONE-acetaminophen 5-325 mg per tablet

## 2017-03-21 NOTE — ED NOTES
Bedside shift change report given to RISA Cline RN (oncoming nurse) by Dior Garcia RN (offgoing nurse). Report included the following information SBAR, MAR, Pain, All test results.

## 2017-03-21 NOTE — Clinical Note
Status[de-identified] Inpatient [101] Type of Bed: Orthopedics [13] Inpatient Hospitalization Certified Necessary for the Following Reasons: 1. Patient Failed outpatient treatment (further clarification in H&P documentation) Admitting Diagnosis: Post-operative infection [[id number]] Admitting Physician: Loly Pulliam Attending Physician: Loly Pulliam Estimated Length of Stay: 3-4 Midnights Discharge Plan[de-identified] Home with Office Follow-up

## 2017-03-21 NOTE — ED PROVIDER NOTES
HPI Comments: Gerardo Aparicio is a 32 y/o female who presents to the ED via EMS with a cc of back pain. Pt with prior back surgery and recent post op infection. She had lumbar discectomy on 2/6/17 by Dr. Keya garrett. She was initially doing well post operatively. On 3/5/17 she developed significant spasms and was admitted for intractable pain. She had revision discectomy on 3/7/17 and was found to have post op infection. cx grew enterobacter. 3/10/17 discharged home, receiving IV ertapenem via PICC line daily. She has been taking dilaudid, flexeril, and valium for pain/spasm. She notes increasing pain since being discharged without improvement despite medications. She notes that her home health nurse has noticed swelling near her incision site. There has been no drainage. She notes paresthesia to the R foot which she thinks may be r/t her position on the stretcher. She denies saddle anesthesia, bowel or bladder dysfunction, fever, chills, dysuria. She rates current pain as 7/10 throbbing pain to her lower back. SocHx: , non smoker     Patient is a 33 y.o. female presenting with back pain. Back Pain    Pertinent negatives include no fever, no dysuria and no weakness. Past Medical History:   Diagnosis Date    Ill-defined condition     elevated cholesterol       Past Surgical History:   Procedure Laterality Date     ORTHOPAEDIC  02/2017    back surgery w/ Dr Jay Vanessa [address] [facility] eye surgery         History reviewed. No pertinent family history. Social History     Social History    Marital status:      Spouse name: N/A    Number of children: N/A    Years of education: N/A     Occupational History    Not on file.      Social History Main Topics    Smoking status: Never Smoker    Smokeless tobacco: Never Used    Alcohol use 2.4 oz/week     2 Glasses of wine, 2 Cans of beer per week    Drug use: No    Sexual activity: Yes     Partners: Male     Birth control/ protection: Pill     Other Topics Concern    Not on file     Social History Narrative         ALLERGIES: Review of patient's allergies indicates no known allergies. Review of Systems   Constitutional: Negative for chills and fever. Gastrointestinal: Negative for vomiting. Genitourinary: Negative for difficulty urinating and dysuria. Musculoskeletal: Positive for back pain. Allergic/Immunologic: Negative for immunocompromised state. Neurological: Negative for weakness. All other systems reviewed and are negative. Vitals:    03/21/17 1800 03/21/17 1830 03/21/17 1845 03/21/17 1915   BP: 120/78 112/76 115/77 107/70   Pulse:       Resp:       Temp:       SpO2: 95% 99% 99% 95%   Weight:       Height:                Physical Exam   Constitutional: She is oriented to person, place, and time. She appears well-developed and well-nourished. Appears uncomfortable particularly with movement  Non toxic    HENT:   Head: Normocephalic and atraumatic. Eyes: Conjunctivae are normal. Right eye exhibits no discharge. Left eye exhibits no discharge. Neck: Normal range of motion. Cardiovascular: Normal rate and regular rhythm. Pulmonary/Chest: Effort normal and breath sounds normal. No respiratory distress. She has no wheezes. She has no rales. Abdominal: Soft. She exhibits no distension. There is no tenderness. There is no rebound and no guarding. Musculoskeletal: She exhibits no edema or deformity. There is diffuse lumbar tenderness; just lateral to the midline incision site there is an area of swelling that is tender to palpation; there is no erythema or warmth; no drainage    Neurological: She is alert and oriented to person, place, and time. She has normal strength. She displays no atrophy and no tremor. No sensory deficit. She exhibits normal muscle tone. She displays no seizure activity. GCS eye subscore is 4. GCS verbal subscore is 5. GCS motor subscore is 6. Skin: Skin is warm and dry.  She is not diaphoretic. Psychiatric: She has a normal mood and affect. Her behavior is normal. Judgment and thought content normal.   Nursing note and vitals reviewed. MDM  ED Course         32 y/o female with low back pain, recent post op infection. ESR 90, up from 40 at time of last admission. No bowel or bladder dysfunction or saddle anesthesia. Afebrile here. Pt also evaluated by ED attending Dr. Mae Dixon. Sandy BUCIO consulted; she spoke with Dr. Amari Mckeon. Will repeat MRI and admit patient. Pt given IV vancomycin. She already had her daily dose of ertapenem. Pt admitted to [facility] in stable condition. Blood cx from PICC line ordered however RN was unable to draw blood off of pt's PICC line.   Peripheral blood cx sent   Procedures

## 2017-03-22 ENCOUNTER — HOME CARE VISIT (OUTPATIENT)
Dept: HOME HEALTH SERVICES | Facility: HOME HEALTH | Age: 32
End: 2017-03-22
Payer: COMMERCIAL

## 2017-03-22 ENCOUNTER — APPOINTMENT (OUTPATIENT)
Dept: CT IMAGING | Age: 32
DRG: 856 | End: 2017-03-22
Attending: PHYSICIAN ASSISTANT
Payer: COMMERCIAL

## 2017-03-22 LAB
ANION GAP BLD CALC-SCNC: 9 MMOL/L (ref 5–15)
APPEARANCE FLD: ABNORMAL
BUN SERPL-MCNC: 12 MG/DL (ref 6–20)
BUN/CREAT SERPL: 18 (ref 12–20)
CALCIUM SERPL-MCNC: 8.2 MG/DL (ref 8.5–10.1)
CHLORIDE SERPL-SCNC: 104 MMOL/L (ref 97–108)
CO2 SERPL-SCNC: 24 MMOL/L (ref 21–32)
COLOR FLD: YELLOW
CREAT SERPL-MCNC: 0.67 MG/DL (ref 0.55–1.02)
ERYTHROCYTE [DISTWIDTH] IN BLOOD BY AUTOMATED COUNT: 12.4 % (ref 11.5–14.5)
GLUCOSE SERPL-MCNC: 84 MG/DL (ref 65–100)
HCT VFR BLD AUTO: 28.5 % (ref 35–47)
HGB BLD-MCNC: 10 G/DL (ref 11.5–16)
MCH RBC QN AUTO: 31.8 PG (ref 26–34)
MCHC RBC AUTO-ENTMCNC: 35.1 G/DL (ref 30–36.5)
MCV RBC AUTO: 90.8 FL (ref 80–99)
NUC CELL # FLD: ABNORMAL /CU MM (ref 0–5)
PLATELET # BLD AUTO: 415 K/UL (ref 150–400)
POTASSIUM SERPL-SCNC: 3.9 MMOL/L (ref 3.5–5.1)
RBC # BLD AUTO: 3.14 M/UL (ref 3.8–5.2)
RBC # FLD: >100 /CU MM
SODIUM SERPL-SCNC: 137 MMOL/L (ref 136–145)
SPECIMEN SOURCE FLD: ABNORMAL
WBC # BLD AUTO: 7.7 K/UL (ref 3.6–11)

## 2017-03-22 PROCEDURE — C1729 CATH, DRAINAGE: HCPCS

## 2017-03-22 PROCEDURE — C1769 GUIDE WIRE: HCPCS

## 2017-03-22 PROCEDURE — 77030011229 HC DIL VESL COON COOK -A

## 2017-03-22 PROCEDURE — 77030003666 HC NDL SPINAL BD -A

## 2017-03-22 PROCEDURE — 74011000258 HC RX REV CODE- 258: Performed by: NURSE PRACTITIONER

## 2017-03-22 PROCEDURE — 74011250636 HC RX REV CODE- 250/636: Performed by: RADIOLOGY

## 2017-03-22 PROCEDURE — 65270000029 HC RM PRIVATE

## 2017-03-22 PROCEDURE — 74011250636 HC RX REV CODE- 250/636: Performed by: NURSE PRACTITIONER

## 2017-03-22 PROCEDURE — 74011250636 HC RX REV CODE- 250/636: Performed by: PHYSICIAN ASSISTANT

## 2017-03-22 PROCEDURE — 77030003462 HC NDL BIOP BRST MDT -B

## 2017-03-22 PROCEDURE — 85027 COMPLETE CBC AUTOMATED: CPT | Performed by: PHYSICIAN ASSISTANT

## 2017-03-22 PROCEDURE — 87075 CULTR BACTERIA EXCEPT BLOOD: CPT | Performed by: PHYSICIAN ASSISTANT

## 2017-03-22 PROCEDURE — 74011250636 HC RX REV CODE- 250/636: Performed by: EMERGENCY MEDICINE

## 2017-03-22 PROCEDURE — 80048 BASIC METABOLIC PNL TOTAL CA: CPT | Performed by: PHYSICIAN ASSISTANT

## 2017-03-22 PROCEDURE — 87205 SMEAR GRAM STAIN: CPT | Performed by: PHYSICIAN ASSISTANT

## 2017-03-22 PROCEDURE — 89050 BODY FLUID CELL COUNT: CPT | Performed by: PHYSICIAN ASSISTANT

## 2017-03-22 PROCEDURE — 87102 FUNGUS ISOLATION CULTURE: CPT | Performed by: PHYSICIAN ASSISTANT

## 2017-03-22 PROCEDURE — 74011250637 HC RX REV CODE- 250/637: Performed by: PHYSICIAN ASSISTANT

## 2017-03-22 PROCEDURE — 10030 IMG GID FLU COLL DRG SFT TIS: CPT

## 2017-03-22 PROCEDURE — 36415 COLL VENOUS BLD VENIPUNCTURE: CPT | Performed by: PHYSICIAN ASSISTANT

## 2017-03-22 RX ORDER — NORETHINDRONE ACETATE AND ETHINYL ESTRADIOL 1MG-20(21)
1 KIT ORAL DAILY
Status: DISCONTINUED | OUTPATIENT
Start: 2017-03-22 | End: 2017-04-04 | Stop reason: HOSPADM

## 2017-03-22 RX ORDER — FENTANYL CITRATE 50 UG/ML
100 INJECTION, SOLUTION INTRAMUSCULAR; INTRAVENOUS
Status: DISCONTINUED | OUTPATIENT
Start: 2017-03-22 | End: 2017-03-22

## 2017-03-22 RX ORDER — DIAZEPAM 10 MG/2ML
5 INJECTION INTRAMUSCULAR
Status: DISCONTINUED | OUTPATIENT
Start: 2017-03-22 | End: 2017-03-23

## 2017-03-22 RX ORDER — MIDAZOLAM HYDROCHLORIDE 1 MG/ML
5 INJECTION, SOLUTION INTRAMUSCULAR; INTRAVENOUS
Status: DISCONTINUED | OUTPATIENT
Start: 2017-03-22 | End: 2017-03-22

## 2017-03-22 RX ADMIN — SODIUM CHLORIDE 100 ML/HR: 900 INJECTION, SOLUTION INTRAVENOUS at 00:00

## 2017-03-22 RX ADMIN — DIAZEPAM 5 MG: 5 INJECTION, SOLUTION INTRAMUSCULAR; INTRAVENOUS at 20:51

## 2017-03-22 RX ADMIN — MEROPENEM 500 MG: 500 INJECTION, POWDER, FOR SOLUTION INTRAVENOUS at 20:49

## 2017-03-22 RX ADMIN — FENTANYL CITRATE 25 MCG: 50 INJECTION, SOLUTION INTRAMUSCULAR; INTRAVENOUS at 12:32

## 2017-03-22 RX ADMIN — DIAZEPAM 5 MG: 5 INJECTION, SOLUTION INTRAMUSCULAR; INTRAVENOUS at 00:26

## 2017-03-22 RX ADMIN — MIDAZOLAM HYDROCHLORIDE 1 MG: 1 INJECTION, SOLUTION INTRAMUSCULAR; INTRAVENOUS at 12:23

## 2017-03-22 RX ADMIN — PROMETHAZINE HYDROCHLORIDE 25 MG: 25 TABLET ORAL at 19:45

## 2017-03-22 RX ADMIN — Medication 10 ML: at 14:11

## 2017-03-22 RX ADMIN — OXYCODONE HYDROCHLORIDE AND ACETAMINOPHEN 1 TABLET: 10; 325 TABLET ORAL at 09:21

## 2017-03-22 RX ADMIN — DOCUSATE SODIUM -SENNOSIDES 1 TABLET: 50; 8.6 TABLET, COATED ORAL at 18:18

## 2017-03-22 RX ADMIN — DOCUSATE SODIUM -SENNOSIDES 1 TABLET: 50; 8.6 TABLET, COATED ORAL at 08:34

## 2017-03-22 RX ADMIN — OXYCODONE HYDROCHLORIDE AND ACETAMINOPHEN 1 TABLET: 10; 325 TABLET ORAL at 18:18

## 2017-03-22 RX ADMIN — VANCOMYCIN HYDROCHLORIDE 750 MG: 1 INJECTION, POWDER, LYOPHILIZED, FOR SOLUTION INTRAVENOUS at 10:41

## 2017-03-22 RX ADMIN — OXYCODONE HYDROCHLORIDE AND ACETAMINOPHEN 1 TABLET: 10; 325 TABLET ORAL at 00:26

## 2017-03-22 RX ADMIN — MEROPENEM 500 MG: 500 INJECTION, POWDER, FOR SOLUTION INTRAVENOUS at 14:10

## 2017-03-22 RX ADMIN — OXYCODONE HYDROCHLORIDE AND ACETAMINOPHEN 1 TABLET: 10; 325 TABLET ORAL at 04:34

## 2017-03-22 RX ADMIN — Medication 10 ML: at 09:00

## 2017-03-22 RX ADMIN — MIDAZOLAM HYDROCHLORIDE 1 MG: 1 INJECTION, SOLUTION INTRAMUSCULAR; INTRAVENOUS at 12:26

## 2017-03-22 RX ADMIN — FENTANYL CITRATE 25 MCG: 50 INJECTION, SOLUTION INTRAMUSCULAR; INTRAVENOUS at 12:23

## 2017-03-22 RX ADMIN — FENTANYL CITRATE 25 MCG: 50 INJECTION, SOLUTION INTRAMUSCULAR; INTRAVENOUS at 12:29

## 2017-03-22 RX ADMIN — DIAZEPAM 5 MG: 5 INJECTION, SOLUTION INTRAMUSCULAR; INTRAVENOUS at 14:11

## 2017-03-22 RX ADMIN — VANCOMYCIN HYDROCHLORIDE 750 MG: 1 INJECTION, POWDER, LYOPHILIZED, FOR SOLUTION INTRAVENOUS at 19:41

## 2017-03-22 RX ADMIN — MIDAZOLAM HYDROCHLORIDE 1 MG: 1 INJECTION, SOLUTION INTRAMUSCULAR; INTRAVENOUS at 12:32

## 2017-03-22 RX ADMIN — HYDROMORPHONE HYDROCHLORIDE 1 MG: 1 INJECTION, SOLUTION INTRAMUSCULAR; INTRAVENOUS; SUBCUTANEOUS at 23:56

## 2017-03-22 RX ADMIN — OXYCODONE HYDROCHLORIDE AND ACETAMINOPHEN 1 TABLET: 10; 325 TABLET ORAL at 22:37

## 2017-03-22 RX ADMIN — VANCOMYCIN HYDROCHLORIDE 750 MG: 1 INJECTION, POWDER, LYOPHILIZED, FOR SOLUTION INTRAVENOUS at 03:30

## 2017-03-22 RX ADMIN — OXYCODONE HYDROCHLORIDE AND ACETAMINOPHEN 1 TABLET: 10; 325 TABLET ORAL at 14:17

## 2017-03-22 RX ADMIN — MIDAZOLAM HYDROCHLORIDE 1 MG: 1 INJECTION, SOLUTION INTRAMUSCULAR; INTRAVENOUS at 12:29

## 2017-03-22 RX ADMIN — MIDAZOLAM HYDROCHLORIDE 1 MG: 1 INJECTION, SOLUTION INTRAMUSCULAR; INTRAVENOUS at 12:34

## 2017-03-22 RX ADMIN — FENTANYL CITRATE 25 MCG: 50 INJECTION, SOLUTION INTRAMUSCULAR; INTRAVENOUS at 12:27

## 2017-03-22 RX ADMIN — DIAZEPAM 5 MG: 5 INJECTION, SOLUTION INTRAMUSCULAR; INTRAVENOUS at 08:33

## 2017-03-22 RX ADMIN — Medication 10 ML: at 20:53

## 2017-03-22 NOTE — ED NOTES
Pt upset, yelling. Pt stated her back spasms were induced by her right forearm IV flush. IV d/c'd by Chevis Budds. Pt asking for pain medication.

## 2017-03-22 NOTE — ED NOTES
TRANSFER - OUT REPORT:    Verbal report given to William Holly RN(name) on Safia Velasco  being transferred to [facility] (unit) for routine progression of care       Report consisted of patients Situation, Background, Assessment and   Recommendations(SBAR). Information from the following report(s) SBAR, Kardex, ED Summary, Procedure Summary, Intake/Output and MAR was reviewed with the receiving nurse. Lines:   PICC Single Lumen 16/49/62 Left;Basilic (Active)       Peripheral IV 03/21/17 Right Hand (Active)   Site Assessment Clean, dry, & intact 3/21/2017  8:20 PM   Phlebitis Assessment 0 3/21/2017  8:20 PM   Infiltration Assessment 0 3/21/2017  8:20 PM   Dressing Status Clean, dry, & intact 3/21/2017  8:20 PM   Dressing Type Transparent 3/21/2017  8:20 PM   Hub Color/Line Status Blue;Flushed 3/21/2017  8:20 PM   Alcohol Cap Used Yes 3/21/2017  8:20 PM        Opportunity for questions and clarification was provided.       Patient transported with:   Monitor

## 2017-03-22 NOTE — ED NOTES
Valium given per order. Pt cursing while Valium is being injected. Pt stated \"Fuck, I have never had pain like that. Wendy Baltazar made aware\". No redness/ swelling at IV site right hand.

## 2017-03-22 NOTE — ED NOTES
Unable to draw blood cultures from PICC line. Peri Mcallister NP made aware. Verbal received to cancel blood cultures from PICC line.

## 2017-03-22 NOTE — PROGRESS NOTES
Verbal report given to Azeb Mauricio RN from [facility] to include procedure, total meds, vitals, follow up care and how patient tolerated procedure. Patient to be placed in transport queue for return to floor.

## 2017-03-22 NOTE — PROGRESS NOTES
Problem: Pain  Goal: *Control of Pain  Outcome: Not Progressing Towards Goal  Patient's muscle spasms unrelieved by valium. Increased pain and muscle spasms with movement.

## 2017-03-22 NOTE — ROUTINE PROCESS
Bedside and Verbal shift change report given to Liu Jacinto RN (oncoming nurse) by Robert Harris RN (offgoing nurse). Report included the following information SBAR, Kardex and Recent Results.

## 2017-03-22 NOTE — PROGRESS NOTES
[facility] Infectious Diseases Progress Note  Mortimer Clock MD,             Jame Chaves MD,          Hector Sat DO     [address] [place] [address]    [address], [address]  [phone]  Fax    3/22/2017      Assessment & Plan:   1. Postoperative wound infection, status post recent L5-S1 microdiskectomy. S/p  I&D 2017. Intraoperative cultures growing enterobacter aeruginosa. Patient discharged on 6 week course of ertapenem. Now returns with new fluid collection/abscess. Underwent aspiration by IR today. Cultures pending. Continue vanco/meropenem pending culture results            Subjective:     Feeling better s/p aspiration    Objective:     Vitals:   Visit Vitals    /71 (BP 1 Location: Right arm, BP Patient Position: At rest)    Pulse (!) 113    Temp 98.4 °F (36.9 °C)    Resp 20    Ht 5' 3\" (1.6 m)    Wt 49.9 kg (110 lb)    LMP 2017    SpO2 99%    BMI 19.49 kg/m2        Tmax:  Temp (24hrs), Av.2 °F (36.8 °C), Min:98 °F (36.7 °C), Max:98.4 °F (36.9 °C)      Exam:   Patient is intubated:  no    Physical Examination:   GENERAL ASSESSMENT: NAD  HEENT:Nontraumatic NCAT  CHEST: no distress  HEART:   SKIN:  No rash:   :Prabhakar: no  EXTREMITY: no edema  NEURO:Grossly non focal    Labs:        No lab exists for component: ITNL   No results for input(s): CPK, CKMB, TROIQ in the last 72 hours. Recent Labs      17   0052  17   1626   NA  137  139   K  3.9  4.2   CL  104  99   CO2  24  30   BUN  12  16   CREA  0.67  0.71   GLU  84  85   ALB   --   3.0*   WBC  7.7  6.4   HGB  10.0*  11.6   HCT  28.5*  34.8*   PLT  415*  503*     No results for input(s): INR, PTP, APTT in the last 72 hours.     No lab exists for component: INREXT  Needs: urine analysis, urine sodium, protein and creatinine  No results found for: RAISSA, CREAU      Cultures:     No results found for: SDES  Lab Results   Component Value Date/Time    Culture result: PENDING 2017 01:05 PM Culture result: PENDING 03/22/2017 01:05 PM    Culture result: PENDING 03/22/2017 01:05 PM       Radiology:     Medications       Current Facility-Administered Medications   Medication Dose Route Frequency Last Dose    norethindrone-ethinyl estradiol (JUNEL FE 1/20) 1 mg-20 mcg (21)/75 mg (7) per tablet 1 Tab  1 Tab Oral DAILY      diazePAM (VALIUM) injection 5 mg  5 mg IntraVENous Q6H PRN 5 mg at 03/22/17 1411    meropenem (MERREM) 500 mg in 0.9% sodium chloride (MBP/ADV) 50 mL  500 mg IntraVENous Q6H 500 mg at 03/22/17 1410    promethazine (PHENERGAN) tablet 25 mg  25 mg Oral Q6H PRN      sodium chloride (NS) flush 5-10 mL  5-10 mL IntraVENous DAILY 10 mL at 03/22/17 0900    0.9% sodium chloride infusion  100 mL/hr IntraVENous CONTINUOUS 100 mL/hr at 03/22/17 0000    HYDROmorphone (PF) (DILAUDID) injection 1 mg  1 mg IntraVENous Q4H PRN      oxyCODONE-acetaminophen (PERCOCET) 5-325 mg per tablet 1 Tab  1 Tab Oral Q4H PRN      oxyCODONE-acetaminophen (PERCOCET 10)  mg per tablet 1 Tab  1 Tab Oral Q4H PRN 1 Tab at 03/22/17 1417    vancomycin (VANCOCIN) 750 mg in 0.9% sodium chloride 250 mL IVPB  750 mg IntraVENous Q8H 750 mg at 03/22/17 1041    sodium chloride (NS) flush 5-10 mL  5-10 mL IntraVENous Q8H 10 mL at 03/22/17 1411    sodium chloride (NS) flush 5-10 mL  5-10 mL IntraVENous PRN      acetaminophen (TYLENOL) tablet 650 mg  650 mg Oral Q4H PRN      naloxone (NARCAN) injection 0.4 mg  0.4 mg IntraVENous PRN      diphenhydrAMINE (BENADRYL) injection 12.5 mg  12.5 mg IntraVENous Q4H PRN      senna-docusate (PERICOLACE) 8.6-50 mg per tablet 1 Tab  1 Tab Oral BID 1 Tab at 03/22/17 6391           Case discussed with:  Neurosurgery, Dr Kailey Perez DO

## 2017-03-22 NOTE — ED NOTES
Bedside verbal/ timeout report given David WHITE.  Verification of hospital location [facility] and room 217 completed with EMS provider CHRISTOPHER

## 2017-03-22 NOTE — PROGRESS NOTES
Patient brought to CT at [phone], RN introduced herself and performed vitals and assessment. Patient needed assist with using restroom, was escorted by RN, patient had slow and steady gait, used raised toilet seat to decrease discomfort. Dr. Scottie Rivera at bedside for consent at 12pm  Patient positioned on table, time out called at [phone] with procedure to begin at 1231  Procedure complete at 1248. Procedure site dressed in dry dressing that is CDI. Total meds given 100mcg Fentanyl and 5mg Versed. Patient tolerated procedure well, Dr. Scottie Rivera was able to remove 10cc of génesis color fluid, sample sent to lab, repeat CT showed no further abscess so no drain was placed at this time, Dr. Scottie Rivera to speak with ordering physician. Patient returned to EEG room for recovery at 1300, patient alert and able to converse. Lights dimmed for comfort.

## 2017-03-22 NOTE — PROGRESS NOTES
Bedside and Verbal shift change report given to Vonda Melchor RN (oncoming nurse) by Valeriy Martinez RN (offgoing nurse). Report included the following information SBAR, Kardex, ED Summary, Intake/Output, MAR, Accordion and Recent Results.

## 2017-03-22 NOTE — H&P
ORTHOPAEDIC SPINE SURGERY PROGRESS NOTE    NAME:     Bia Mota   :       1985   MRN:       [id number]   DATE:      3/22/2017    HPI:  Bia Mota is a 33 y.o. female with a hx of having a Right sided L5-S1 microdiscectomy performed on 17. She vacationed in [place] after surgery. She was doing very well at her first post-operative visit in the office (when she was 3 weeks out from surgery). She was admitted to [facility] on 3/5/17 after developing severe back spasms. A new MRI was obtained at that time. The MRI showed a new disc protrusion at L5-S1 and a fluid collection. She underwent a revision microdiscectomy on 3/7/17. We also performed an irrigation and debridement at that time due to presumed infection. Her cultures were + for Enterobacter aerogenes. Infectious Disease was consulted. She had a PICC line placed during her admission. ID recommended treatment with IV Ertapenem. Pt felt improved and was discharged home. Patient started to notice increased frequency of her back spasms on 3/15/17. She was evaluated in the office that day. An aspiration was attempted at that time. Minimal blood was obtained. No purulent fluid. Her pain medication was adjusted at that time. Her antibiotics were continued. Her back spasms continued to worsen. She went to the ED at [facility]. Her CRP and ESR were elevated. Her CBC, CMP and lactic acid were unremarkable. A new Lumbar spine MRI was obtained. She was also admitted to our service at [address] [place] [address]. Her new Lumbar spine MRI showed:   EXAM: MRI LUMB SPINE W WO CONT     INDICATION: Increasing low back pain.  Lumbar spine revision surgery on  3/7/2017.     COMPARISON: MRI lumbar spine on 3/5/2017     TECHNIQUE: MR imaging of the lumbar spine was performed using the following  sequences: sagittal T1, T2, STIR; axial T1, T2 prior to and following contrast  administration.      CONTRAST: 5 mL of Gadavist.     FINDINGS: New right ventral epidural collection posterior to the L5 vertebral  body measures 2.6 cm in craniocaudal dimension. New peripheral enhancing fluid  collection in the right epidural space extends posteriorly into the subcutaneous  adipose tissues and measures 4.6 x 3.4 x 4.5 cm. Minimal mass effect on the  distal thecal sac. Enhancement surrounds the right S1 nerve.     There is normal alignment of the lumbar spine. Vertebral body heights are  maintained.      Subtle endplate irregularity of L5-S1 is new. There is new bone marrow edema  within L5 and S1. Ill-defined enhancing soft tissue is new around the L5 more  than S1 vertebral bodies.      Remaining discs are unchanged. Dorsal annular tear at L3-4 and L4-5 is  unchanged.     The conus medullaris terminates at L1. Signal and caliber of the distal spinal  cord are within normal limits. There is no pathologic intrathecal enhancement.     Paraspinal muscle atrophy is unchanged.     Lower thoracic spine: No herniation or stenosis.     L1-L2: No herniation or stenosis.     L2-L3: No herniation or stenosis.     L3-L4: No stenosis. No change.     L4-L5: Diffuse disc bulge, facet arthrosis, and thickened ligamentum flavum. No  change.     L5-S1: Decreased right central disc protrusion. Unchanged right L5  hemilaminotomy. Decreased minimal central spinal canal stenosis. Ill-defined  infection extends into the bilateral neural foramina.      IMPRESSION  IMPRESSION:     1. New L5-S1 discitis and osteomyelitis. Surrounding phlegmon. Ill-defined  ventral epidural abscess measures 2.6 cm in craniocaudal dimension. 2. New right epidural and posterior paraspinal abscess measures 4.6 x 3.4 x 4.5  cm.  3. Decreased right central disc protrusion at L5-S1.        SUBJECTIVE:    C/O frequent back and hip spasms  The R foot tingling that she had yesterday is resolved  She denies any radiating leg pain, current numbness/tingling, weakness, fevers, urinary retention, loss of bowel/bladder control or saddle paresthesias  Also denies nausea/vomiting, chest pain, headache or shortness of breath      Recent Labs      03/22/17   0052   HGB  10.0*   HCT  28.5*   NA  137   K  3.9   CL  104   CO2  24   BUN  12   CREA  0.67   GLU  84     Patient Vitals for the past 12 hrs:   BP Temp Pulse Resp SpO2   03/22/17 0532 99/61 98.2 °F (36.8 °C) 80 20 96 %   03/22/17 0145 104/68 98.4 °F (36.9 °C) 91 18 95 %   03/22/17 0009 115/77 98.2 °F (36.8 °C) 98 20 100 %   03/21/17 2230 117/78 - - 20 99 %   03/21/17 2200 125/85 - - 20 97 %   03/21/17 2045 125/82 - - 20 94 %   03/21/17 2019 117/72 - - 20 99 %       EXAM:  Mild swelling at incision site. Incision is closed. No drainage or dehiscence. No erythema or increased warmth. Prineo dressing still in place   Positive strength/ROM bilat lower ext. 5/5 strength in bilateral LEs. Neuro intact to sensation  Calves, soft & nontender  BL LEs NVID  2+ patellar and achilles reflexes  No clubbing, cyanosis or edema. PLAN:  Continue pain medications  OOB w/ assistance  Advance regular diet  Will consult RUPINDER Butts  Orthopaedic Surgery  Physician Assistant to Dr. Luis Enrique Atwood    9:16 AM  Spoke w/ Interventional Radiologist, Dr. Davey Urbina, this morning. Discussed Lumbar spine MRI results with him. He will place a CT guided drain today to drain the fluid seen on pt's MRI. Cultures to be obtained.

## 2017-03-22 NOTE — PROGRESS NOTES
3/22/2017 11:43 AM Attempted to meet with pt. Pt off the floor. EMR reviewed. Pt is a readmission, pt was recently at [facility] from 3/5-3/10. Pt was discharged home with iv abx through [address] [place] [address] and [facility] through [facility]. CM will follow for discharge needs.  CHRISS Macario

## 2017-03-22 NOTE — PROGRESS NOTES
Primary Nurse Nuha Paez and Annette Iraheta RN performed a dual skin assessment on this patient No impairment noted. Old incision site, lower back, POA.   Sony score is 18

## 2017-03-22 NOTE — PROGRESS NOTES
Patient put in transport for drain line placement. Patient chart reviewed and Radiology nurse Yanira spoke with Ananth Bragg earlier to advise procedure time of around or shortly after 11:00. Medications ordered for procedure.   Consent to be obtained prior to procedure

## 2017-03-23 LAB
ANION GAP BLD CALC-SCNC: 8 MMOL/L (ref 5–15)
BUN SERPL-MCNC: 7 MG/DL (ref 6–20)
BUN/CREAT SERPL: 11 (ref 12–20)
CALCIUM SERPL-MCNC: 8.5 MG/DL (ref 8.5–10.1)
CHLORIDE SERPL-SCNC: 105 MMOL/L (ref 97–108)
CO2 SERPL-SCNC: 27 MMOL/L (ref 21–32)
CREAT SERPL-MCNC: 0.65 MG/DL (ref 0.55–1.02)
DATE LAST DOSE: NORMAL
ERYTHROCYTE [DISTWIDTH] IN BLOOD BY AUTOMATED COUNT: 12.6 % (ref 11.5–14.5)
GLUCOSE SERPL-MCNC: 85 MG/DL (ref 65–100)
HCT VFR BLD AUTO: 30.9 % (ref 35–47)
HGB BLD-MCNC: 10.2 G/DL (ref 11.5–16)
MCH RBC QN AUTO: 30.8 PG (ref 26–34)
MCHC RBC AUTO-ENTMCNC: 33 G/DL (ref 30–36.5)
MCV RBC AUTO: 93.4 FL (ref 80–99)
PLATELET # BLD AUTO: 432 K/UL (ref 150–400)
POTASSIUM SERPL-SCNC: 4.5 MMOL/L (ref 3.5–5.1)
RBC # BLD AUTO: 3.31 M/UL (ref 3.8–5.2)
REPORTED DOSE,DOSE: NORMAL UNITS
REPORTED DOSE/TIME,TMG: 1900
SODIUM SERPL-SCNC: 140 MMOL/L (ref 136–145)
VANCOMYCIN TROUGH SERPL-MCNC: 6.6 UG/ML (ref 5–10)
WBC # BLD AUTO: 4.7 K/UL (ref 3.6–11)

## 2017-03-23 PROCEDURE — 74011250636 HC RX REV CODE- 250/636: Performed by: EMERGENCY MEDICINE

## 2017-03-23 PROCEDURE — 74011000258 HC RX REV CODE- 258: Performed by: NURSE PRACTITIONER

## 2017-03-23 PROCEDURE — 74011250636 HC RX REV CODE- 250/636: Performed by: NURSE PRACTITIONER

## 2017-03-23 PROCEDURE — 36415 COLL VENOUS BLD VENIPUNCTURE: CPT | Performed by: PHYSICIAN ASSISTANT

## 2017-03-23 PROCEDURE — 74011250636 HC RX REV CODE- 250/636: Performed by: INTERNAL MEDICINE

## 2017-03-23 PROCEDURE — 74011250636 HC RX REV CODE- 250/636: Performed by: PHYSICIAN ASSISTANT

## 2017-03-23 PROCEDURE — 80048 BASIC METABOLIC PNL TOTAL CA: CPT | Performed by: PHYSICIAN ASSISTANT

## 2017-03-23 PROCEDURE — 65270000029 HC RM PRIVATE

## 2017-03-23 PROCEDURE — 77030018846 HC SOL IRR STRL H20 ICUM -A

## 2017-03-23 PROCEDURE — 85027 COMPLETE CBC AUTOMATED: CPT | Performed by: PHYSICIAN ASSISTANT

## 2017-03-23 PROCEDURE — 74011250637 HC RX REV CODE- 250/637: Performed by: PHYSICIAN ASSISTANT

## 2017-03-23 PROCEDURE — 80202 ASSAY OF VANCOMYCIN: CPT | Performed by: PHYSICIAN ASSISTANT

## 2017-03-23 PROCEDURE — 74011250637 HC RX REV CODE- 250/637: Performed by: NURSE PRACTITIONER

## 2017-03-23 RX ORDER — POLYETHYLENE GLYCOL 3350 17 G/17G
17 POWDER, FOR SOLUTION ORAL DAILY
Status: DISCONTINUED | OUTPATIENT
Start: 2017-03-23 | End: 2017-03-24

## 2017-03-23 RX ORDER — DIAZEPAM 10 MG/2ML
5 INJECTION INTRAMUSCULAR EVERY 6 HOURS
Status: DISCONTINUED | OUTPATIENT
Start: 2017-03-23 | End: 2017-03-25

## 2017-03-23 RX ADMIN — Medication 10 ML: at 14:43

## 2017-03-23 RX ADMIN — OXYCODONE HYDROCHLORIDE AND ACETAMINOPHEN 1 TABLET: 10; 325 TABLET ORAL at 23:35

## 2017-03-23 RX ADMIN — SODIUM CHLORIDE 100 ML/HR: 900 INJECTION, SOLUTION INTRAVENOUS at 23:48

## 2017-03-23 RX ADMIN — VANCOMYCIN HYDROCHLORIDE 1000 MG: 1 INJECTION, POWDER, LYOPHILIZED, FOR SOLUTION INTRAVENOUS at 23:35

## 2017-03-23 RX ADMIN — Medication 10 ML: at 06:00

## 2017-03-23 RX ADMIN — MEROPENEM 500 MG: 500 INJECTION, POWDER, FOR SOLUTION INTRAVENOUS at 19:55

## 2017-03-23 RX ADMIN — HYDROMORPHONE HYDROCHLORIDE 1 MG: 1 INJECTION, SOLUTION INTRAMUSCULAR; INTRAVENOUS; SUBCUTANEOUS at 07:32

## 2017-03-23 RX ADMIN — MEROPENEM 500 MG: 500 INJECTION, POWDER, FOR SOLUTION INTRAVENOUS at 08:25

## 2017-03-23 RX ADMIN — VANCOMYCIN HYDROCHLORIDE 750 MG: 1 INJECTION, POWDER, LYOPHILIZED, FOR SOLUTION INTRAVENOUS at 07:04

## 2017-03-23 RX ADMIN — MEROPENEM 500 MG: 500 INJECTION, POWDER, FOR SOLUTION INTRAVENOUS at 14:42

## 2017-03-23 RX ADMIN — Medication 10 ML: at 11:43

## 2017-03-23 RX ADMIN — DOCUSATE SODIUM -SENNOSIDES 1 TABLET: 50; 8.6 TABLET, COATED ORAL at 08:25

## 2017-03-23 RX ADMIN — POLYETHYLENE GLYCOL 3350 17 G: 17 POWDER, FOR SOLUTION ORAL at 11:39

## 2017-03-23 RX ADMIN — VANCOMYCIN HYDROCHLORIDE 1000 MG: 1 INJECTION, POWDER, LYOPHILIZED, FOR SOLUTION INTRAVENOUS at 15:53

## 2017-03-23 RX ADMIN — NORETHINDRONE ACETATE AND ETHINYL ESTRADIOL 1 TABLET: KIT at 09:00

## 2017-03-23 RX ADMIN — HYDROMORPHONE HYDROCHLORIDE 1 MG: 1 INJECTION, SOLUTION INTRAMUSCULAR; INTRAVENOUS; SUBCUTANEOUS at 11:34

## 2017-03-23 RX ADMIN — DIAZEPAM 5 MG: 5 INJECTION, SOLUTION INTRAMUSCULAR; INTRAVENOUS at 07:57

## 2017-03-23 RX ADMIN — HYDROMORPHONE HYDROCHLORIDE 1 MG: 1 INJECTION, SOLUTION INTRAMUSCULAR; INTRAVENOUS; SUBCUTANEOUS at 21:18

## 2017-03-23 RX ADMIN — HYDROMORPHONE HYDROCHLORIDE 1 MG: 1 INJECTION, SOLUTION INTRAMUSCULAR; INTRAVENOUS; SUBCUTANEOUS at 17:42

## 2017-03-23 RX ADMIN — DIAZEPAM 5 MG: 5 INJECTION, SOLUTION INTRAMUSCULAR; INTRAVENOUS at 14:42

## 2017-03-23 RX ADMIN — MEROPENEM 500 MG: 500 INJECTION, POWDER, FOR SOLUTION INTRAVENOUS at 01:26

## 2017-03-23 RX ADMIN — Medication 10 ML: at 21:18

## 2017-03-23 RX ADMIN — DIAZEPAM 5 MG: 5 INJECTION, SOLUTION INTRAMUSCULAR; INTRAVENOUS at 19:56

## 2017-03-23 NOTE — PROGRESS NOTES
3/23/2017 11:09 AM Met with pt and pt's . Planning for pt to return home with [facility] through [facility] and iv abx through [address] [place] [address]. Pt was agreeable. CM will follow.  CHRISS Marino

## 2017-03-23 NOTE — PROGRESS NOTES
[address] [address] Pharmacy Dosing Services:     Consult for antibiotic dosing of Vancomycin   Indication:Bone/Joint Infection, Surgical Site Infection  Day of Therapy 3    Vancomycin therapy:  Current maintenance dose: 750(mg) every 8 hours (frequency). Trough goal 15-20 mcg/mL. Last trough level 6.6 mcg/ml drawn 4 hrs late. Corrected trough 11 mcg/ml. Plan:1000 mg every 8 hours  Dose calculated to approximate a therapeutic trough of 15 mcg/mL. Pharmacy to follow daily.   Pharmacist Addis Torres                       [id number]

## 2017-03-23 NOTE — PROGRESS NOTES
[facility] Infectious Diseases Progress Note  Caleb Melgar MD,             Aime Sanches MD,          Aime Thomas DO     [address]    [address], [address]  [phone]  Fax    3/23/2017      Assessment & Plan:   1. Postoperative wound infection, status post recent L5-S1 microdiskectomy. S/p  I&D 2017. Intraoperative cultures growing enterobacter aeruginosa. Patient discharged on 6 week course of ertapenem. Now returns with new fluid collection/abscess. Underwent aspiration by IR 3/22. Cultures pending. Continue vanco/meropenem pending culture results            Subjective:     Feeling better s/p aspiration    Objective:     Vitals:   Visit Vitals    BP (!) 159/106    Pulse (!) 107    Temp 98 °F (36.7 °C)    Resp 20    Ht 5' 3\" (1.6 m)    Wt 49.9 kg (110 lb)    LMP 2017    SpO2 91%    BMI 19.49 kg/m2        Tmax:  Temp (24hrs), Av.2 °F (36.8 °C), Min:98 °F (36.7 °C), Max:98.4 °F (36.9 °C)      Exam:   Patient is intubated:  no    Physical Examination:   GENERAL ASSESSMENT: NAD  HEENT:Nontraumatic NCAT  CHEST: no distress  HEART:   SKIN:  No rash:   :Prabhakar: no  EXTREMITY: no edema  NEURO:Grossly non focal    Labs:        No lab exists for component: ITNL   No results for input(s): CPK, CKMB, TROIQ in the last 72 hours. Recent Labs      17   0700  17   0052  17   1626   NA  140  137  139   K  4.5  3.9  4.2   CL  105  104  99   CO2  27  24  30   BUN  7  12  16   CREA  0.65  0.67  0.71   GLU  85  84  85   ALB   --    --   3.0*   WBC  4.7  7.7  6.4   HGB  10.2*  10.0*  11.6   HCT  30.9*  28.5*  34.8*   PLT  432*  415*  503*     No results for input(s): INR, PTP, APTT in the last 72 hours.     No lab exists for component: INREXT, INREXT  Needs: urine analysis, urine sodium, protein and creatinine  No results found for: RAISSA, CREAU      Cultures:     No results found for: SDES  Lab Results   Component Value Date/Time    Culture result: NO GROWTH AFTER 15 HOURS 03/22/2017 01:05 PM    Culture result: PENDING 03/22/2017 01:05 PM    Culture result: PENDING 03/22/2017 01:05 PM       Radiology:     Medications       Current Facility-Administered Medications   Medication Dose Route Frequency Last Dose    diazePAM (VALIUM) injection 5 mg  5 mg IntraVENous Q6H      polyethylene glycol (MIRALAX) packet 17 g  17 g Oral DAILY      norethindrone-ethinyl estradiol (JUNEL FE 1/20) 1 mg-20 mcg (21)/75 mg (7) per tablet 1 Tab  1 Tab Oral DAILY      meropenem (MERREM) 500 mg in 0.9% sodium chloride (MBP/ADV) 50 mL  500 mg IntraVENous Q6H 500 mg at 03/23/17 0825    promethazine (PHENERGAN) tablet 25 mg  25 mg Oral Q6H PRN 25 mg at 03/22/17 1945    sodium chloride (NS) flush 5-10 mL  5-10 mL IntraVENous DAILY 10 mL at 03/22/17 0900    0.9% sodium chloride infusion  100 mL/hr IntraVENous CONTINUOUS 100 mL/hr at 03/22/17 0000    HYDROmorphone (PF) (DILAUDID) injection 1 mg  1 mg IntraVENous Q4H PRN 1 mg at 03/23/17 0732    oxyCODONE-acetaminophen (PERCOCET) 5-325 mg per tablet 1 Tab  1 Tab Oral Q4H PRN      oxyCODONE-acetaminophen (PERCOCET 10)  mg per tablet 1 Tab  1 Tab Oral Q4H PRN 1 Tab at 03/22/17 2237    vancomycin (VANCOCIN) 750 mg in 0.9% sodium chloride 250 mL IVPB  750 mg IntraVENous Q8H 750 mg at 03/23/17 0704    sodium chloride (NS) flush 5-10 mL  5-10 mL IntraVENous Q8H 10 mL at 03/23/17 0600    sodium chloride (NS) flush 5-10 mL  5-10 mL IntraVENous PRN      acetaminophen (TYLENOL) tablet 650 mg  650 mg Oral Q4H PRN      naloxone (NARCAN) injection 0.4 mg  0.4 mg IntraVENous PRN      diphenhydrAMINE (BENADRYL) injection 12.5 mg  12.5 mg IntraVENous Q4H PRN      senna-docusate (PERICOLACE) 8.6-50 mg per tablet 1 Tab  1 Tab Oral BID 1 Tab at 03/23/17 0613           Case discussed with:  Patient and davion at [address], DO

## 2017-03-23 NOTE — PROGRESS NOTES
Bedside and Verbal shift change report given to Jenni Orlando. RN (oncoming nurse) by Toyin Orourke RN (offgoing nurse). Report included the following information SBAR, Kardex, Intake/Output and MAR.

## 2017-03-23 NOTE — PROGRESS NOTES
ORTHOPAEDIC SPINE SURGERY PROGRESS NOTE    NAME:     Serg Dorsey   :       1985   MRN:       [id number]   DATE:      3/23/2017          SUBJECTIVE:    C/O back spasm  No leg pain or numbness  Denies nausea/vomiting, chest pain, headache or shortness of breath    Cultures from aspiration pending      Recent Labs      17   0700   HGB  10.2*   HCT  30.9*   NA  140   K  4.5   CL  105   CO2  27   BUN  7   CREA  0.65   GLU  85     Patient Vitals for the past 12 hrs:   BP Temp Pulse Resp SpO2   17 0955 - - - - 97 %   17 0953 (!) 159/106 - (!) 107 - 91 %   17 0942 109/70 98 °F (36.7 °C) 86 20 99 %       EXAM:  Dressings clean, dry and intact. Mild swelling to L of healing incision. No erythema, warmth or drainage   Positive strength/ROM bilat lower ext.   Neuro intact to sensation  Calves, soft & nontender  BL LEs NVID      PLAN:  Will make Valium scheduled  Continue pain medications as ordered  OOB w/ assistance  Advance regular diet    Arnold Mendez [place]  Orthopaedic Surgery  Physician Assistant to Dr. Jason Underwood

## 2017-03-24 ENCOUNTER — ANESTHESIA EVENT (OUTPATIENT)
Dept: SURGERY | Age: 32
DRG: 856 | End: 2017-03-24
Payer: COMMERCIAL

## 2017-03-24 ENCOUNTER — ANESTHESIA (OUTPATIENT)
Dept: SURGERY | Age: 32
DRG: 856 | End: 2017-03-24
Payer: COMMERCIAL

## 2017-03-24 LAB
ABO + RH BLD: NORMAL
ANION GAP BLD CALC-SCNC: 8 MMOL/L (ref 5–15)
BLOOD GROUP ANTIBODIES SERPL: NORMAL
BUN SERPL-MCNC: 5 MG/DL (ref 6–20)
BUN/CREAT SERPL: 8 (ref 12–20)
CALCIUM SERPL-MCNC: 8.3 MG/DL (ref 8.5–10.1)
CHLORIDE SERPL-SCNC: 107 MMOL/L (ref 97–108)
CO2 SERPL-SCNC: 26 MMOL/L (ref 21–32)
CREAT SERPL-MCNC: 0.61 MG/DL (ref 0.55–1.02)
DATE LAST DOSE: ABNORMAL
ERYTHROCYTE [DISTWIDTH] IN BLOOD BY AUTOMATED COUNT: 12.3 % (ref 11.5–14.5)
GLUCOSE SERPL-MCNC: 84 MG/DL (ref 65–100)
HCT VFR BLD AUTO: 28.4 % (ref 35–47)
HGB BLD-MCNC: 9.7 G/DL (ref 11.5–16)
MCH RBC QN AUTO: 31.2 PG (ref 26–34)
MCHC RBC AUTO-ENTMCNC: 34.2 G/DL (ref 30–36.5)
MCV RBC AUTO: 91.3 FL (ref 80–99)
PLATELET # BLD AUTO: 378 K/UL (ref 150–400)
POTASSIUM SERPL-SCNC: 3.9 MMOL/L (ref 3.5–5.1)
RBC # BLD AUTO: 3.11 M/UL (ref 3.8–5.2)
REPORTED DOSE,DOSE: ABNORMAL UNITS
REPORTED DOSE/TIME,TMG: 700
SODIUM SERPL-SCNC: 141 MMOL/L (ref 136–145)
SPECIMEN EXP DATE BLD: NORMAL
VANCOMYCIN TROUGH SERPL-MCNC: 11.9 UG/ML (ref 5–10)
WBC # BLD AUTO: 5.1 K/UL (ref 3.6–11)

## 2017-03-24 PROCEDURE — 74011000250 HC RX REV CODE- 250: Performed by: ORTHOPAEDIC SURGERY

## 2017-03-24 PROCEDURE — 74011250637 HC RX REV CODE- 250/637: Performed by: NURSE PRACTITIONER

## 2017-03-24 PROCEDURE — 74011250636 HC RX REV CODE- 250/636: Performed by: INTERNAL MEDICINE

## 2017-03-24 PROCEDURE — 77030002982 HC SUT POLYSRB J&J -A: Performed by: ORTHOPAEDIC SURGERY

## 2017-03-24 PROCEDURE — 76060000034 HC ANESTHESIA 1.5 TO 2 HR: Performed by: ORTHOPAEDIC SURGERY

## 2017-03-24 PROCEDURE — 77030026438 HC STYL ET INTUB CARD -A: Performed by: NURSE ANESTHETIST, CERTIFIED REGISTERED

## 2017-03-24 PROCEDURE — 77030008467 HC STPLR SKN COVD -B: Performed by: ORTHOPAEDIC SURGERY

## 2017-03-24 PROCEDURE — 74011000250 HC RX REV CODE- 250

## 2017-03-24 PROCEDURE — 36415 COLL VENOUS BLD VENIPUNCTURE: CPT | Performed by: PHYSICIAN ASSISTANT

## 2017-03-24 PROCEDURE — 77030008684 HC TU ET CUF COVD -B: Performed by: NURSE ANESTHETIST, CERTIFIED REGISTERED

## 2017-03-24 PROCEDURE — 74011250637 HC RX REV CODE- 250/637: Performed by: PHYSICIAN ASSISTANT

## 2017-03-24 PROCEDURE — 0S900ZZ DRAINAGE OF LUMBAR VERTEBRAL JOINT, OPEN APPROACH: ICD-10-PCS | Performed by: ORTHOPAEDIC SURGERY

## 2017-03-24 PROCEDURE — 77030012935 HC DRSG AQUACEL BMS -B: Performed by: ORTHOPAEDIC SURGERY

## 2017-03-24 PROCEDURE — 76010000153 HC OR TIME 1.5 TO 2 HR: Performed by: ORTHOPAEDIC SURGERY

## 2017-03-24 PROCEDURE — 77030018836 HC SOL IRR NACL ICUM -A: Performed by: ORTHOPAEDIC SURGERY

## 2017-03-24 PROCEDURE — 77030003666 HC NDL SPINAL BD -A: Performed by: ORTHOPAEDIC SURGERY

## 2017-03-24 PROCEDURE — 77030018719 HC DRSG PTCH ANTIMIC J&J -A: Performed by: ORTHOPAEDIC SURGERY

## 2017-03-24 PROCEDURE — 74011000258 HC RX REV CODE- 258: Performed by: NURSE PRACTITIONER

## 2017-03-24 PROCEDURE — 80202 ASSAY OF VANCOMYCIN: CPT | Performed by: PHYSICIAN ASSISTANT

## 2017-03-24 PROCEDURE — 74011250636 HC RX REV CODE- 250/636: Performed by: PHYSICIAN ASSISTANT

## 2017-03-24 PROCEDURE — 74011250636 HC RX REV CODE- 250/636: Performed by: NURSE PRACTITIONER

## 2017-03-24 PROCEDURE — 3E0U029 INTRODUCTION OF OTHER ANTI-INFECTIVE INTO JOINTS, OPEN APPROACH: ICD-10-PCS | Performed by: ORTHOPAEDIC SURGERY

## 2017-03-24 PROCEDURE — 77030031139 HC SUT VCRL2 J&J -A: Performed by: ORTHOPAEDIC SURGERY

## 2017-03-24 PROCEDURE — 77030018723 HC ELCTRD BLD COVD -A: Performed by: ORTHOPAEDIC SURGERY

## 2017-03-24 PROCEDURE — 77030034850: Performed by: ORTHOPAEDIC SURGERY

## 2017-03-24 PROCEDURE — 77030020782 HC GWN BAIR PAWS FLX 3M -B

## 2017-03-24 PROCEDURE — 77030032490 HC SLV COMPR SCD KNE COVD -B: Performed by: ORTHOPAEDIC SURGERY

## 2017-03-24 PROCEDURE — 65270000029 HC RM PRIVATE

## 2017-03-24 PROCEDURE — 77030019908 HC STETH ESOPH SIMS -A: Performed by: NURSE ANESTHETIST, CERTIFIED REGISTERED

## 2017-03-24 PROCEDURE — 86900 BLOOD TYPING SEROLOGIC ABO: CPT | Performed by: PHYSICIAN ASSISTANT

## 2017-03-24 PROCEDURE — 87205 SMEAR GRAM STAIN: CPT | Performed by: ORTHOPAEDIC SURGERY

## 2017-03-24 PROCEDURE — 77030013079 HC BLNKT BAIR HGGR 3M -A: Performed by: ANESTHESIOLOGY

## 2017-03-24 PROCEDURE — 87186 SC STD MICRODIL/AGAR DIL: CPT | Performed by: ORTHOPAEDIC SURGERY

## 2017-03-24 PROCEDURE — 87075 CULTR BACTERIA EXCEPT BLOOD: CPT | Performed by: ORTHOPAEDIC SURGERY

## 2017-03-24 PROCEDURE — 87077 CULTURE AEROBIC IDENTIFY: CPT | Performed by: ORTHOPAEDIC SURGERY

## 2017-03-24 PROCEDURE — 85027 COMPLETE CBC AUTOMATED: CPT | Performed by: PHYSICIAN ASSISTANT

## 2017-03-24 PROCEDURE — 74011000272 HC RX REV CODE- 272: Performed by: ORTHOPAEDIC SURGERY

## 2017-03-24 PROCEDURE — 76210000017 HC OR PH I REC 1.5 TO 2 HR: Performed by: ORTHOPAEDIC SURGERY

## 2017-03-24 PROCEDURE — 74011250636 HC RX REV CODE- 250/636

## 2017-03-24 PROCEDURE — 77030003161 HC GRFT DURA MTRX INLC -E: Performed by: ORTHOPAEDIC SURGERY

## 2017-03-24 PROCEDURE — 74011250636 HC RX REV CODE- 250/636: Performed by: ANESTHESIOLOGY

## 2017-03-24 PROCEDURE — 80048 BASIC METABOLIC PNL TOTAL CA: CPT | Performed by: PHYSICIAN ASSISTANT

## 2017-03-24 RX ORDER — PROPOFOL 10 MG/ML
INJECTION, EMULSION INTRAVENOUS AS NEEDED
Status: DISCONTINUED | OUTPATIENT
Start: 2017-03-24 | End: 2017-03-24 | Stop reason: HOSPADM

## 2017-03-24 RX ORDER — SODIUM CHLORIDE 0.9 % (FLUSH) 0.9 %
5-10 SYRINGE (ML) INJECTION AS NEEDED
Status: DISCONTINUED | OUTPATIENT
Start: 2017-03-24 | End: 2017-03-24 | Stop reason: HOSPADM

## 2017-03-24 RX ORDER — ROCURONIUM BROMIDE 10 MG/ML
INJECTION, SOLUTION INTRAVENOUS AS NEEDED
Status: DISCONTINUED | OUTPATIENT
Start: 2017-03-24 | End: 2017-03-24 | Stop reason: HOSPADM

## 2017-03-24 RX ORDER — POVIDONE-IODINE 10 %
SOLUTION, NON-ORAL TOPICAL AS NEEDED
Status: DISCONTINUED | OUTPATIENT
Start: 2017-03-24 | End: 2017-03-24 | Stop reason: HOSPADM

## 2017-03-24 RX ORDER — SODIUM CHLORIDE, SODIUM LACTATE, POTASSIUM CHLORIDE, CALCIUM CHLORIDE 600; 310; 30; 20 MG/100ML; MG/100ML; MG/100ML; MG/100ML
INJECTION, SOLUTION INTRAVENOUS
Status: DISCONTINUED | OUTPATIENT
Start: 2017-03-24 | End: 2017-03-24 | Stop reason: HOSPADM

## 2017-03-24 RX ORDER — FENTANYL CITRATE 50 UG/ML
INJECTION, SOLUTION INTRAMUSCULAR; INTRAVENOUS AS NEEDED
Status: DISCONTINUED | OUTPATIENT
Start: 2017-03-24 | End: 2017-03-24 | Stop reason: HOSPADM

## 2017-03-24 RX ORDER — SUCCINYLCHOLINE CHLORIDE 20 MG/ML
INJECTION INTRAMUSCULAR; INTRAVENOUS AS NEEDED
Status: DISCONTINUED | OUTPATIENT
Start: 2017-03-24 | End: 2017-03-24 | Stop reason: HOSPADM

## 2017-03-24 RX ORDER — HYDROMORPHONE HYDROCHLORIDE 2 MG/ML
INJECTION, SOLUTION INTRAMUSCULAR; INTRAVENOUS; SUBCUTANEOUS AS NEEDED
Status: DISCONTINUED | OUTPATIENT
Start: 2017-03-24 | End: 2017-03-24 | Stop reason: HOSPADM

## 2017-03-24 RX ORDER — HYDROMORPHONE HYDROCHLORIDE 1 MG/ML
1 INJECTION, SOLUTION INTRAMUSCULAR; INTRAVENOUS; SUBCUTANEOUS
Status: DISCONTINUED | OUTPATIENT
Start: 2017-03-24 | End: 2017-03-25

## 2017-03-24 RX ORDER — HYDROMORPHONE HYDROCHLORIDE 1 MG/ML
.25-.5 INJECTION, SOLUTION INTRAMUSCULAR; INTRAVENOUS; SUBCUTANEOUS
Status: DISCONTINUED | OUTPATIENT
Start: 2017-03-24 | End: 2017-03-24 | Stop reason: HOSPADM

## 2017-03-24 RX ORDER — SODIUM CHLORIDE, SODIUM LACTATE, POTASSIUM CHLORIDE, CALCIUM CHLORIDE 600; 310; 30; 20 MG/100ML; MG/100ML; MG/100ML; MG/100ML
25 INJECTION, SOLUTION INTRAVENOUS CONTINUOUS
Status: DISCONTINUED | OUTPATIENT
Start: 2017-03-24 | End: 2017-03-24 | Stop reason: HOSPADM

## 2017-03-24 RX ORDER — HYDROMORPHONE HYDROCHLORIDE 1 MG/ML
1 INJECTION, SOLUTION INTRAMUSCULAR; INTRAVENOUS; SUBCUTANEOUS ONCE
Status: COMPLETED | OUTPATIENT
Start: 2017-03-24 | End: 2017-03-24

## 2017-03-24 RX ORDER — MIDAZOLAM HYDROCHLORIDE 1 MG/ML
INJECTION, SOLUTION INTRAMUSCULAR; INTRAVENOUS AS NEEDED
Status: DISCONTINUED | OUTPATIENT
Start: 2017-03-24 | End: 2017-03-24 | Stop reason: HOSPADM

## 2017-03-24 RX ORDER — HYDROMORPHONE HYDROCHLORIDE 2 MG/ML
1 INJECTION, SOLUTION INTRAMUSCULAR; INTRAVENOUS; SUBCUTANEOUS ONCE
Status: DISCONTINUED | OUTPATIENT
Start: 2017-03-24 | End: 2017-03-24

## 2017-03-24 RX ORDER — DEXAMETHASONE SODIUM PHOSPHATE 4 MG/ML
INJECTION, SOLUTION INTRA-ARTICULAR; INTRALESIONAL; INTRAMUSCULAR; INTRAVENOUS; SOFT TISSUE AS NEEDED
Status: DISCONTINUED | OUTPATIENT
Start: 2017-03-24 | End: 2017-03-24 | Stop reason: HOSPADM

## 2017-03-24 RX ORDER — LIDOCAINE HYDROCHLORIDE 20 MG/ML
INJECTION, SOLUTION EPIDURAL; INFILTRATION; INTRACAUDAL; PERINEURAL AS NEEDED
Status: DISCONTINUED | OUTPATIENT
Start: 2017-03-24 | End: 2017-03-24 | Stop reason: HOSPADM

## 2017-03-24 RX ORDER — SODIUM CHLORIDE 0.9 % (FLUSH) 0.9 %
5-10 SYRINGE (ML) INJECTION EVERY 8 HOURS
Status: DISCONTINUED | OUTPATIENT
Start: 2017-03-24 | End: 2017-03-24 | Stop reason: HOSPADM

## 2017-03-24 RX ORDER — ONDANSETRON 2 MG/ML
INJECTION INTRAMUSCULAR; INTRAVENOUS AS NEEDED
Status: DISCONTINUED | OUTPATIENT
Start: 2017-03-24 | End: 2017-03-24 | Stop reason: HOSPADM

## 2017-03-24 RX ORDER — OXYCODONE HCL 10 MG/1
10 TABLET, FILM COATED, EXTENDED RELEASE ORAL EVERY 12 HOURS
Status: DISCONTINUED | OUTPATIENT
Start: 2017-03-24 | End: 2017-03-25

## 2017-03-24 RX ORDER — BUPIVACAINE HYDROCHLORIDE AND EPINEPHRINE 5; 5 MG/ML; UG/ML
INJECTION, SOLUTION EPIDURAL; INTRACAUDAL; PERINEURAL AS NEEDED
Status: DISCONTINUED | OUTPATIENT
Start: 2017-03-24 | End: 2017-03-24 | Stop reason: HOSPADM

## 2017-03-24 RX ADMIN — ROCURONIUM BROMIDE 10 MG: 10 INJECTION, SOLUTION INTRAVENOUS at 15:57

## 2017-03-24 RX ADMIN — MEROPENEM 500 MG: 500 INJECTION, POWDER, FOR SOLUTION INTRAVENOUS at 21:09

## 2017-03-24 RX ADMIN — HYDROMORPHONE HYDROCHLORIDE 0.5 MG: 1 INJECTION, SOLUTION INTRAMUSCULAR; INTRAVENOUS; SUBCUTANEOUS at 18:13

## 2017-03-24 RX ADMIN — OXYCODONE HYDROCHLORIDE AND ACETAMINOPHEN 1 TABLET: 10; 325 TABLET ORAL at 11:51

## 2017-03-24 RX ADMIN — VANCOMYCIN HYDROCHLORIDE 1000 MG: 1 INJECTION, POWDER, LYOPHILIZED, FOR SOLUTION INTRAVENOUS at 06:35

## 2017-03-24 RX ADMIN — HYDROMORPHONE HYDROCHLORIDE 1 MG: 1 INJECTION, SOLUTION INTRAMUSCULAR; INTRAVENOUS; SUBCUTANEOUS at 12:23

## 2017-03-24 RX ADMIN — FENTANYL CITRATE 100 MCG: 50 INJECTION, SOLUTION INTRAMUSCULAR; INTRAVENOUS at 15:50

## 2017-03-24 RX ADMIN — FENTANYL CITRATE 50 MCG: 50 INJECTION, SOLUTION INTRAMUSCULAR; INTRAVENOUS at 17:20

## 2017-03-24 RX ADMIN — MEROPENEM 500 MG: 500 INJECTION, POWDER, FOR SOLUTION INTRAVENOUS at 14:32

## 2017-03-24 RX ADMIN — OXYCODONE HYDROCHLORIDE AND ACETAMINOPHEN 1 TABLET: 10; 325 TABLET ORAL at 07:56

## 2017-03-24 RX ADMIN — HYDROMORPHONE HYDROCHLORIDE 1 MG: 1 INJECTION, SOLUTION INTRAMUSCULAR; INTRAVENOUS; SUBCUTANEOUS at 09:14

## 2017-03-24 RX ADMIN — Medication 10 ML: at 08:57

## 2017-03-24 RX ADMIN — FENTANYL CITRATE 100 MCG: 50 INJECTION, SOLUTION INTRAMUSCULAR; INTRAVENOUS at 16:12

## 2017-03-24 RX ADMIN — HYDROMORPHONE HYDROCHLORIDE 1 MG: 2 INJECTION, SOLUTION INTRAMUSCULAR; INTRAVENOUS; SUBCUTANEOUS at 17:40

## 2017-03-24 RX ADMIN — PROPOFOL 150 MG: 10 INJECTION, EMULSION INTRAVENOUS at 15:57

## 2017-03-24 RX ADMIN — ONDANSETRON 4 MG: 2 INJECTION INTRAMUSCULAR; INTRAVENOUS at 16:30

## 2017-03-24 RX ADMIN — FENTANYL CITRATE 50 MCG: 50 INJECTION, SOLUTION INTRAMUSCULAR; INTRAVENOUS at 17:45

## 2017-03-24 RX ADMIN — SODIUM CHLORIDE, SODIUM LACTATE, POTASSIUM CHLORIDE, CALCIUM CHLORIDE: 600; 310; 30; 20 INJECTION, SOLUTION INTRAVENOUS at 15:48

## 2017-03-24 RX ADMIN — VANCOMYCIN HYDROCHLORIDE 1 G: 1 INJECTION, POWDER, LYOPHILIZED, FOR SOLUTION INTRAVENOUS at 16:35

## 2017-03-24 RX ADMIN — MIDAZOLAM HYDROCHLORIDE 5 MG: 1 INJECTION, SOLUTION INTRAMUSCULAR; INTRAVENOUS at 15:50

## 2017-03-24 RX ADMIN — Medication 10 ML: at 22:00

## 2017-03-24 RX ADMIN — HYDROMORPHONE HYDROCHLORIDE 0.5 MG: 1 INJECTION, SOLUTION INTRAMUSCULAR; INTRAVENOUS; SUBCUTANEOUS at 18:06

## 2017-03-24 RX ADMIN — DEXAMETHASONE SODIUM PHOSPHATE 4 MG: 4 INJECTION, SOLUTION INTRA-ARTICULAR; INTRALESIONAL; INTRAMUSCULAR; INTRAVENOUS; SOFT TISSUE at 16:30

## 2017-03-24 RX ADMIN — FENTANYL CITRATE 50 MCG: 50 INJECTION, SOLUTION INTRAMUSCULAR; INTRAVENOUS at 16:51

## 2017-03-24 RX ADMIN — FENTANYL CITRATE 150 MCG: 50 INJECTION, SOLUTION INTRAMUSCULAR; INTRAVENOUS at 15:57

## 2017-03-24 RX ADMIN — NORETHINDRONE ACETATE AND ETHINYL ESTRADIOL 1 TABLET: KIT at 08:54

## 2017-03-24 RX ADMIN — MEROPENEM 500 MG: 500 INJECTION, POWDER, FOR SOLUTION INTRAVENOUS at 08:53

## 2017-03-24 RX ADMIN — DIAZEPAM 5 MG: 5 INJECTION, SOLUTION INTRAMUSCULAR; INTRAVENOUS at 14:08

## 2017-03-24 RX ADMIN — HYDROMORPHONE HYDROCHLORIDE 1 MG: 1 INJECTION, SOLUTION INTRAMUSCULAR; INTRAVENOUS; SUBCUTANEOUS at 06:35

## 2017-03-24 RX ADMIN — HYDROMORPHONE HYDROCHLORIDE 1 MG: 1 INJECTION, SOLUTION INTRAMUSCULAR; INTRAVENOUS; SUBCUTANEOUS at 18:18

## 2017-03-24 RX ADMIN — DIAZEPAM 5 MG: 5 INJECTION, SOLUTION INTRAMUSCULAR; INTRAVENOUS at 01:36

## 2017-03-24 RX ADMIN — HYDROMORPHONE HYDROCHLORIDE 0.5 MG: 1 INJECTION, SOLUTION INTRAMUSCULAR; INTRAVENOUS; SUBCUTANEOUS at 18:31

## 2017-03-24 RX ADMIN — DIAZEPAM 5 MG: 5 INJECTION, SOLUTION INTRAMUSCULAR; INTRAVENOUS at 07:54

## 2017-03-24 RX ADMIN — MEROPENEM 500 MG: 500 INJECTION, POWDER, FOR SOLUTION INTRAVENOUS at 01:35

## 2017-03-24 RX ADMIN — HYDROMORPHONE HYDROCHLORIDE 0.5 MG: 1 INJECTION, SOLUTION INTRAMUSCULAR; INTRAVENOUS; SUBCUTANEOUS at 18:26

## 2017-03-24 RX ADMIN — OXYCODONE HYDROCHLORIDE 10 MG: 10 TABLET, FILM COATED, EXTENDED RELEASE ORAL at 09:14

## 2017-03-24 RX ADMIN — SUCCINYLCHOLINE CHLORIDE 80 MG: 20 INJECTION INTRAMUSCULAR; INTRAVENOUS at 15:57

## 2017-03-24 RX ADMIN — SODIUM CHLORIDE 100 ML/HR: 900 INJECTION, SOLUTION INTRAVENOUS at 21:20

## 2017-03-24 RX ADMIN — OXYCODONE HYDROCHLORIDE AND ACETAMINOPHEN 1 TABLET: 10; 325 TABLET ORAL at 18:36

## 2017-03-24 RX ADMIN — OXYCODONE HYDROCHLORIDE 10 MG: 10 TABLET, FILM COATED, EXTENDED RELEASE ORAL at 21:10

## 2017-03-24 RX ADMIN — HYDROMORPHONE HYDROCHLORIDE 1 MG: 2 INJECTION, SOLUTION INTRAMUSCULAR; INTRAVENOUS; SUBCUTANEOUS at 17:14

## 2017-03-24 RX ADMIN — DIAZEPAM 5 MG: 5 INJECTION, SOLUTION INTRAMUSCULAR; INTRAVENOUS at 21:10

## 2017-03-24 RX ADMIN — SODIUM CHLORIDE, SODIUM LACTATE, POTASSIUM CHLORIDE, CALCIUM CHLORIDE: 600; 310; 30; 20 INJECTION, SOLUTION INTRAVENOUS at 16:50

## 2017-03-24 RX ADMIN — LIDOCAINE HYDROCHLORIDE 30 MG: 20 INJECTION, SOLUTION EPIDURAL; INFILTRATION; INTRACAUDAL; PERINEURAL at 15:57

## 2017-03-24 RX ADMIN — OXYCODONE HYDROCHLORIDE AND ACETAMINOPHEN 1 TABLET: 10; 325 TABLET ORAL at 04:30

## 2017-03-24 RX ADMIN — HYDROMORPHONE HYDROCHLORIDE 1 MG: 1 INJECTION, SOLUTION INTRAMUSCULAR; INTRAVENOUS; SUBCUTANEOUS at 01:35

## 2017-03-24 NOTE — H&P
Date of Surgery Update:  Chris Wilder was seen and examined. History and physical has been reviewed. The patient has been examined.  There have been no significant clinical changes since the completion of the originally dated History and Physical.    Signed By: Huma Chand MD     March 24, 2017 3:43 PM

## 2017-03-24 NOTE — PROGRESS NOTES
Bedside and Verbal shift change report given to Slime Dooley (oncoming nurse) by Katheryn Coe (offgoing nurse). Report included the following information SBAR, Kardex, Intake/Output, MAR and Recent Results.

## 2017-03-24 NOTE — PROGRESS NOTES
ORTHOPAEDIC SPINE SURGERY PROGRESS NOTE    NAME:     Fifi Soto   :       1985   MRN:       [id number]   DATE:      3/24/2017      SUBJECTIVE:    C/O intermittent sharp back spasms  No leg pain or numbness  Denies nausea/vomiting, chest pain, headache or shortness of breath  Currently on scheduled IV Valium    Aspiration cultures are negative so far. Final results pending. Recent Labs      17   0436   HGB  9.7*   HCT  28.4*   NA  141   K  3.9   CL  107   CO2  26   BUN  5*   CREA  0.61   GLU  84     Patient Vitals for the past 12 hrs:   BP Temp Pulse Resp SpO2   17 0446 107/62 97.4 °F (36.3 °C) 80 18 96 %   17 0047 114/71 98 °F (36.7 °C) 91 18 96 %   17 2153 117/70 97.5 °F (36.4 °C) 85 18 100 %       EXAM:  Dressings clean, dry and intact. Some swelling to L of healing incision, mildly increased from yesterday. No erythema, warmth or drainage   Positive strength/ROM bilat lower ext. Neuro intact to sensation  Calves, soft & nontender  BL LEs NVID        PLAN:  Continue pain medications and IV Valium    9:36 AM  Pt having a lot of pain as d/w Ortho NP Alba. NP will adjust pt's pain medication as discussed. Spoke w/ Dr. Karen Mccarthy. Pt will be made NPO. Last meal was oatmeal at 6:30 PM last night, has had some small sips of water this morning. We will take her to the OR today for a lumbar incision and drainage.  Will need a new type and screen, ordered STAT      Trinidad Quintanilla [place]  Orthopaedic Surgery  Physician Assistant to Dr. Abdulaziz Aranda

## 2017-03-24 NOTE — ANESTHESIA POSTPROCEDURE EVALUATION
Post-Anesthesia Evaluation and Assessment    Patient: Carol Noel MRN: [id number]  SSN: [id number]    YOB: 1985  Age: 33 y.o. Sex: female       Cardiovascular Function/Vital Signs  Visit Vitals    /71    Pulse 92    Temp 36.1 °C (97 °F)    Resp 24    Ht 5' 3\" (1.6 m)    Wt 49.9 kg (110 lb)    SpO2 100%    BMI 19.49 kg/m2       Patient is status post general anesthesia for Procedure(s):  SPINE INCISION AND DEBRIDEMENT LUMBAR. Nausea/Vomiting: None    Postoperative hydration reviewed and adequate. Pain:  Pain Scale 1: Numeric (0 - 10) (03/24/17 1840)  Pain Intensity 1: 7 (03/24/17 1840)   Patient does not want any more medications    Neurological Status:   Neuro (WDL): Within Defined Limits (03/24/17 1750)  Neuro  Neurologic State: Alert (03/24/17 1443)  Orientation Level: Appropriate for age (03/24/17 1443)  Cognition: Other (comment) (medicated for pain) (03/24/17 1443)  Speech: Clear (03/24/17 1443)  LUE Motor Response: Purposeful (03/24/17 1750)  LLE Motor Response: Purposeful (03/24/17 1750)  RUE Motor Response: Purposeful (03/24/17 1750)  RLE Motor Response: Purposeful (03/24/17 1750)   At baseline    Mental Status and Level of Consciousness: Arousable    Pulmonary Status:   O2 Device: Nasal cannula (03/24/17 1750)   Adequate oxygenation and airway patent    Complications related to anesthesia: None    Post-anesthesia assessment completed.  No concerns    Signed By: Billie Duckworth DO     March 24, 2017

## 2017-03-24 NOTE — ANESTHESIA PREPROCEDURE EVALUATION
Anesthetic History   No history of anesthetic complications            Review of Systems / Medical History  Patient summary reviewed, nursing notes reviewed and pertinent labs reviewed    Pulmonary  Within defined limits                 Neuro/Psych   Within defined limits           Cardiovascular  Within defined limits                Exercise tolerance: >4 METS     GI/Hepatic/Renal  Within defined limits              Endo/Other  Within defined limits           Other Findings   Comments: Lumbar spine infection s/p laminectomy earlier this month           Physical Exam    Airway  Mallampati: I    Neck ROM: normal range of motion   Mouth opening: Normal     Cardiovascular  Regular rate and rhythm,  S1 and S2 normal,  no murmur, click, rub, or gallop  Rhythm: regular  Rate: normal         Dental  No notable dental hx       Pulmonary  Breath sounds clear to auscultation               Abdominal  GI exam deferred       Other Findings            Anesthetic Plan    ASA: 2  Anesthesia type: general          Induction: Intravenous  Anesthetic plan and risks discussed with: Patient

## 2017-03-24 NOTE — PERIOP NOTES
Pt is due 1500 dose of Vanc but has to have Vanc trough obtained first.  Unable to draw blood from pic line. Unable to obtain blood via venipuncture. Dr Wood Bears aware. Pt to have type and cross and van trough drawn in or. 1500 dose of Vanc to be started after blood obtained.

## 2017-03-24 NOTE — PROGRESS NOTES
Bedside and Verbal shift change report given to Flori Mejia RN (oncoming nurse) by Tanvi Taveras RN (offgoing nurse). Report included the following information SBAR, Kardex, Procedure Summary, Intake/Output, MAR and Recent Results.

## 2017-03-24 NOTE — BRIEF OP NOTE
BRIEF OPERATIVE NOTE    Date of Procedure: 3/24/2017   Preoperative Diagnosis: Status Post surgery infection  Postoperative Diagnosis: * No post-op diagnosis entered *    Procedure(s):  SPINE INCISION AND DEBRIDEMENT LUMBAR  Surgeon(s) and Role:     * Iman Navarro MD - Primary       Physician Assistant: RUPINDER Melo    Surgical Staff:  Circ-1: Cheko Ribera RN; Loida Cloud RN  Circ-2: Karina Carrillo RN  Physician Assistant: RUPINDER Melo  Scrub Tech-1: Danuta Wasserman; Deb Steiner  [facility] Staff: Breonna Willoughby RN  Event Time In   Incision Start 1647   Incision Close 1713     Anesthesia: General   Estimated Blood Loss: 10  Specimens: cultures of lumbar wound   Findings: as above   Complications: none  Implants: * No implants in log *

## 2017-03-25 LAB
ANION GAP BLD CALC-SCNC: 8 MMOL/L (ref 5–15)
BUN SERPL-MCNC: 4 MG/DL (ref 6–20)
BUN/CREAT SERPL: 7 (ref 12–20)
CALCIUM SERPL-MCNC: 8.4 MG/DL (ref 8.5–10.1)
CHLORIDE SERPL-SCNC: 103 MMOL/L (ref 97–108)
CO2 SERPL-SCNC: 28 MMOL/L (ref 21–32)
CREAT SERPL-MCNC: 0.61 MG/DL (ref 0.55–1.02)
ERYTHROCYTE [DISTWIDTH] IN BLOOD BY AUTOMATED COUNT: 12.2 % (ref 11.5–14.5)
GLUCOSE SERPL-MCNC: 97 MG/DL (ref 65–100)
HCT VFR BLD AUTO: 29.8 % (ref 35–47)
HGB BLD-MCNC: 10.3 G/DL (ref 11.5–16)
MCH RBC QN AUTO: 31.2 PG (ref 26–34)
MCHC RBC AUTO-ENTMCNC: 34.6 G/DL (ref 30–36.5)
MCV RBC AUTO: 90.3 FL (ref 80–99)
PLATELET # BLD AUTO: 362 K/UL (ref 150–400)
POTASSIUM SERPL-SCNC: 3.9 MMOL/L (ref 3.5–5.1)
RBC # BLD AUTO: 3.3 M/UL (ref 3.8–5.2)
SODIUM SERPL-SCNC: 139 MMOL/L (ref 136–145)
WBC # BLD AUTO: 5.5 K/UL (ref 3.6–11)

## 2017-03-25 PROCEDURE — 74011250637 HC RX REV CODE- 250/637: Performed by: NURSE PRACTITIONER

## 2017-03-25 PROCEDURE — 85027 COMPLETE CBC AUTOMATED: CPT | Performed by: PHYSICIAN ASSISTANT

## 2017-03-25 PROCEDURE — 65270000029 HC RM PRIVATE

## 2017-03-25 PROCEDURE — 74011000258 HC RX REV CODE- 258: Performed by: NURSE PRACTITIONER

## 2017-03-25 PROCEDURE — 74011250637 HC RX REV CODE- 250/637: Performed by: PHYSICIAN ASSISTANT

## 2017-03-25 PROCEDURE — 36415 COLL VENOUS BLD VENIPUNCTURE: CPT | Performed by: PHYSICIAN ASSISTANT

## 2017-03-25 PROCEDURE — 80048 BASIC METABOLIC PNL TOTAL CA: CPT | Performed by: PHYSICIAN ASSISTANT

## 2017-03-25 PROCEDURE — 74011250636 HC RX REV CODE- 250/636: Performed by: PHYSICIAN ASSISTANT

## 2017-03-25 PROCEDURE — 77010033678 HC OXYGEN DAILY

## 2017-03-25 PROCEDURE — 74011250636 HC RX REV CODE- 250/636: Performed by: NURSE PRACTITIONER

## 2017-03-25 PROCEDURE — 36592 COLLECT BLOOD FROM PICC: CPT

## 2017-03-25 PROCEDURE — 74011250636 HC RX REV CODE- 250/636

## 2017-03-25 PROCEDURE — 74011250636 HC RX REV CODE- 250/636: Performed by: INTERNAL MEDICINE

## 2017-03-25 RX ORDER — CIPROFLOXACIN 2 MG/ML
400 INJECTION, SOLUTION INTRAVENOUS EVERY 12 HOURS
Status: DISCONTINUED | OUTPATIENT
Start: 2017-03-25 | End: 2017-03-27

## 2017-03-25 RX ORDER — HYDROMORPHONE HYDROCHLORIDE 1 MG/ML
1 INJECTION, SOLUTION INTRAMUSCULAR; INTRAVENOUS; SUBCUTANEOUS
Status: DISCONTINUED | OUTPATIENT
Start: 2017-03-25 | End: 2017-03-28

## 2017-03-25 RX ORDER — HYDROMORPHONE HCL IN 0.9% NACL 15 MG/30ML
PATIENT CONTROLLED ANALGESIA VIAL INTRAVENOUS
Status: DISCONTINUED | OUTPATIENT
Start: 2017-03-25 | End: 2017-03-30

## 2017-03-25 RX ORDER — DIAZEPAM 10 MG/2ML
5 INJECTION INTRAMUSCULAR
Status: DISCONTINUED | OUTPATIENT
Start: 2017-03-25 | End: 2017-03-30

## 2017-03-25 RX ORDER — HEPARIN 100 UNIT/ML
SYRINGE INTRAVENOUS
Status: COMPLETED
Start: 2017-03-25 | End: 2017-03-25

## 2017-03-25 RX ORDER — CIPROFLOXACIN 2 MG/ML
400 INJECTION, SOLUTION INTRAVENOUS EVERY 12 HOURS
Status: DISCONTINUED | OUTPATIENT
Start: 2017-03-25 | End: 2017-03-25

## 2017-03-25 RX ORDER — CYCLOBENZAPRINE HCL 10 MG
10 TABLET ORAL 3 TIMES DAILY
Status: DISCONTINUED | OUTPATIENT
Start: 2017-03-25 | End: 2017-03-27

## 2017-03-25 RX ADMIN — DIAZEPAM 5 MG: 5 INJECTION, SOLUTION INTRAMUSCULAR; INTRAVENOUS at 08:07

## 2017-03-25 RX ADMIN — Medication 10 ML: at 06:14

## 2017-03-25 RX ADMIN — VANCOMYCIN HYDROCHLORIDE 1000 MG: 1 INJECTION, POWDER, LYOPHILIZED, FOR SOLUTION INTRAVENOUS at 06:28

## 2017-03-25 RX ADMIN — HYDROMORPHONE HYDROCHLORIDE 1 MG: 1 INJECTION, SOLUTION INTRAMUSCULAR; INTRAVENOUS; SUBCUTANEOUS at 06:28

## 2017-03-25 RX ADMIN — HYDROMORPHONE HYDROCHLORIDE 1 MG: 1 INJECTION, SOLUTION INTRAMUSCULAR; INTRAVENOUS; SUBCUTANEOUS at 00:31

## 2017-03-25 RX ADMIN — DIAZEPAM 5 MG: 5 INJECTION, SOLUTION INTRAMUSCULAR; INTRAVENOUS at 02:22

## 2017-03-25 RX ADMIN — Medication: at 12:15

## 2017-03-25 RX ADMIN — HYDROMORPHONE HYDROCHLORIDE 1 MG: 1 INJECTION, SOLUTION INTRAMUSCULAR; INTRAVENOUS; SUBCUTANEOUS at 03:30

## 2017-03-25 RX ADMIN — OXYCODONE HYDROCHLORIDE AND ACETAMINOPHEN 1 TABLET: 10; 325 TABLET ORAL at 12:36

## 2017-03-25 RX ADMIN — Medication 500 UNITS: at 03:48

## 2017-03-25 RX ADMIN — OXYCODONE HYDROCHLORIDE 10 MG: 10 TABLET, FILM COATED, EXTENDED RELEASE ORAL at 08:07

## 2017-03-25 RX ADMIN — MEROPENEM 500 MG: 500 INJECTION, POWDER, FOR SOLUTION INTRAVENOUS at 08:18

## 2017-03-25 RX ADMIN — HYDROMORPHONE HYDROCHLORIDE 1 MG: 1 INJECTION, SOLUTION INTRAMUSCULAR; INTRAVENOUS; SUBCUTANEOUS at 09:37

## 2017-03-25 RX ADMIN — VANCOMYCIN HYDROCHLORIDE 1000 MG: 1 INJECTION, POWDER, LYOPHILIZED, FOR SOLUTION INTRAVENOUS at 22:01

## 2017-03-25 RX ADMIN — MEROPENEM 500 MG: 500 INJECTION, POWDER, FOR SOLUTION INTRAVENOUS at 14:01

## 2017-03-25 RX ADMIN — MEROPENEM 500 MG: 500 INJECTION, POWDER, FOR SOLUTION INTRAVENOUS at 19:56

## 2017-03-25 RX ADMIN — DIAZEPAM 5 MG: 5 INJECTION, SOLUTION INTRAMUSCULAR; INTRAVENOUS at 17:38

## 2017-03-25 RX ADMIN — CYCLOBENZAPRINE HYDROCHLORIDE 10 MG: 10 TABLET, FILM COATED ORAL at 14:01

## 2017-03-25 RX ADMIN — VANCOMYCIN HYDROCHLORIDE 1000 MG: 1 INJECTION, POWDER, LYOPHILIZED, FOR SOLUTION INTRAVENOUS at 15:37

## 2017-03-25 RX ADMIN — NORETHINDRONE ACETATE AND ETHINYL ESTRADIOL 1 TABLET: KIT at 08:24

## 2017-03-25 RX ADMIN — CIPROFLOXACIN 400 MG: 2 INJECTION, SOLUTION INTRAVENOUS at 14:35

## 2017-03-25 RX ADMIN — VANCOMYCIN HYDROCHLORIDE 1000 MG: 1 INJECTION, POWDER, LYOPHILIZED, FOR SOLUTION INTRAVENOUS at 00:30

## 2017-03-25 RX ADMIN — CYCLOBENZAPRINE HYDROCHLORIDE 10 MG: 10 TABLET, FILM COATED ORAL at 22:00

## 2017-03-25 RX ADMIN — ALTEPLASE 1 MG: 2.2 INJECTION, POWDER, LYOPHILIZED, FOR SOLUTION INTRAVENOUS at 10:25

## 2017-03-25 RX ADMIN — MEROPENEM 500 MG: 500 INJECTION, POWDER, FOR SOLUTION INTRAVENOUS at 02:21

## 2017-03-25 RX ADMIN — OXYCODONE HYDROCHLORIDE AND ACETAMINOPHEN 1 TABLET: 10; 325 TABLET ORAL at 19:56

## 2017-03-25 RX ADMIN — OXYCODONE HYDROCHLORIDE AND ACETAMINOPHEN 1 TABLET: 10; 325 TABLET ORAL at 08:07

## 2017-03-25 RX ADMIN — Medication 10 ML: at 14:01

## 2017-03-25 NOTE — ROUTINE PROCESS
Orthopedic & Surgical Nursing Communication Tool       7:58 AM  3/25/2017    Bedside and Verbal shift change report given to [facility], RN (incoming nurse) by Joe Bhatia RN (outgoing nurse) on Inessa Best a 33 y.o. female who was admitted on 3/21/2017  3:40 PM. Report included the following information SBAR, Kardex and MAR. Significant changes during shift: assumed care of pt, patient needing consistent pain medication. Utilizing heating pad and ice packs.        Issues for physician to address: none            Code Status: No Order     Infections: No current active infections     Allergies: Review of patient's allergies indicates no known allergies. Diet: DIET REGULAR         Admission Date 3/21/2017   Admission Diagnosis Post-operative infection  Status Post surgery infection     Consults IP CONSULT TO INFECTIOUS DISEASES            Consults   [x]PT   []OT   []Speech   []Case Management      [] Rehab      Cardiac Monitoring Order   []Yes   [x]No         DVT Prophylaxis   SCDs:                 Jorge stockings:            [] Meds   []Contraindicated   []None        Activity Level Activity Level: Up with Assistance      Activity Assistance: Partial (one person)     Purposeful Rounding every 1-2 hour?    [x]Yes   Georges Score  Total Score: 3     Bed Alarm (If score 3 or >)   []Yes   [] Refused (See signed refusal form in chart)   Sony Score  Sony Score: 19       Sony Score (if score 14 or less)   []PMT consult   []Wound Care consult      []Specialty bed   [] Nutrition consult      LBM: Last Bowel Movement Date: 03/23/17        Influenza Vaccine Received Flu Vaccine for Current Season (usually Sept-March): Yes        DIET Active Orders   Diet    DIET REGULAR      LDA PICC Single Lumen 41/98/03 Left;Basilic (Active)       PICC Single Lumen Left (Active)   Central Line Being Utilized Yes 3/25/2017  3:00 AM   Criteria for Appropriate Use Long term IV/antibiotic administration 3/25/2017 3:00 AM   Site Assessment Clean, dry, & intact 3/25/2017  3:00 AM   Phlebitis Assessment 0 3/25/2017  3:00 AM   Infiltration Assessment 0 3/25/2017  3:00 AM   Date of Last Dressing Change 03/23/17 3/24/2017 10:02 PM   Dressing Status Clean, dry, & intact 3/25/2017  3:00 AM   Dressing Type Tape;Transparent 3/25/2017  3:00 AM   Hub Color/Line Status Purple; Infusing 3/25/2017  3:00 AM   Action Taken Blood drawn 3/24/2017 10:08 AM   Positive Blood Return (Site #1) Yes 3/25/2017 12:30 AM   Alcohol Cap Used Yes 3/24/2017 10:08 AM      Urinary Catheter Urinary Catheter 03/24/17 2- way-Criteria for Appropriate Use: Surgical procedure   Intake & Output   Date 03/24/17 0700 - 03/25/17 0659 03/25/17 0700 - 03/26/17 0659   Shift 3056-0149 7177-4866 24 Hour Total 3076-3329 2327-0909 24 Hour Total   I  N  T  A  K  E   P.O.  240 240         P. O.  240 240       I.V.  (mL/kg/hr) 1800  (3) 3328.3  (5.6) 5128.3  (4.3)         Volume (0.9% sodium chloride infusion) 300 2078.3 2378. 3         Volume (lactated ringers infusion) 1500  1500         Volume (meropenem (MERREM) 500 mg in 0.9% sodium chloride (MBP/ADV) 50 mL)  250 250         Volume (vancomycin (VANCOCIN) 1,000 mg in 0.9% sodium chloride (MBP/ADV) 250 mL)  1000 1000       Shift Total  (mL/kg) 1800  (36.1) 3568.3  (71.5) 5368.3  (107.6)      O  U  T  P  U  T   Urine  (mL/kg/hr) 3100  (5.2) 4100  (6.8) 7200  (6)         Urine Voided 1600  1600         Urine Output 1500  1500         Urine Output (mL) (Urinary Catheter 03/24/17 2- way)  4100 4100       Drains  15 15         Output (ml) (Hemovac Lower Back)  15 15       Shift Total  (mL/kg) 3100  (62.1) 4115  (82.5) 7215  (144.6)      NET -1300 -546.7 -1846.7      Weight (kg) 49.9 49.9 49.9 49.9 49.9 49.9        Expected Length of Stay 3d 16h   Actual Length of Stay 4       Opportunity for questions and clarifications were given to the incoming nurse.  Patient's bed is in low position, side rails x2, door open PRN, call bell within reach and patient not in distress.       Luda Cabrera RN

## 2017-03-25 NOTE — PROGRESS NOTES
[facility] Dosing Services: Indication:Bone/Joint Infection, Surgical Site Infection  Day of Therapy 4     Vancomycin therapy:  Current maintenance dose: 1000 (mg) every 8 hours (frequency). Trough goal 15-20 mcg/mL. Last trough level 11.9 mcg/ml drawn 2 hrs late. Corrected trough 16 mcg/ml  Plan: continue current dose      Pharmacy to follow daily.   Pharmacist  Addis [id number]

## 2017-03-25 NOTE — OP NOTES
[facility]   [address] [facility], [address]   OP NOTE       Name:  Leilani Willoughby   MR#:  [id number]   :  1985   Account #:  [de-identified]    Surgery Date:  2017   Date of Adm:  2017       PREOPERATIVE DIAGNOSIS: Lumbar wound infection. POSTOPERATIVE DIAGNOSIS: Lumbar wound infection. PROCEDURE PERFORMED:   Lumbar wound irrigation and   debridement. SURGEON: Adrien Chatman MD.    ASSISTANT: Genesis Werner. ANESTHESIA: General.    ESTIMATED BLOOD LOSS: 10 mL. SPECIMENS REMOVED: We obtained cultures of the lumbar wound   fluid. FINDINGS: There is approximately 10 mL of cloudy serosanguineous   fluid. COMPLICATIONS: None. INDICATIONS FOR PROCEDURE:  The patient is a very pleasant 33  year-old female who had a diskectomy at L5-S1. She initially did very   well after the surgery. She then developed recurrent lower back pain. She was found to have an infection. She underwent an irrigation and   debridement. She was placed on IV antibiotics per the infectious   disease specialist. She was discharged to home in good medical   condition. She was doing relatively well after her irrigation and debridement. She   then developed recurrent lower back pain with spasms. The pain was   getting progressively worse. A new MRI showed persistent infection. She was readmitted. The patient did not respond to medical   management. Her pain was not improving. Consequently, the decision   was made to proceed with operative intervention. DESCRIPTION OF PROCEDURE: She was identified in the   preoperative holding area. The lumbar spine was marked by me. She   was transferred to the operating room where general anesthesia was   given. She was placed prone. All bony prominences were well-padded. We prepped and draped the lumbar spine in standard fashion. We   performed a surgical timeout, I made a skin incision over her old   incision.  There was approximately 10 mL of cloudy serosanguineous   fluid within the subcutaneous tissues that extended to the lumbar   spine. We obtained cultures. The lumbar spine was completely   irrigated with antibiotic solution using pulse lavage. I also irrigated the   disk space, like I did during the previous irrigation and debridement. I   irrigated the disk space with a catheter using antibiotic solution. We   also soaked the wound in dilute Betadine solution for 3 minutes. We   copiously irrigated the wound with additional saline after the Betadine   soak. There was good hemostasis. A drain was placed. The wound   was closed in multiple layers. The patient was extubated and   transferred to the recovery room in good medical condition. I, Dr. Enma Cosme, performed the above procedure.         MD NEAL Uribe / KELLY   D:  03/24/2017   17:19   T:  03/24/2017   22:23   Job #:  [id number]

## 2017-03-25 NOTE — PROGRESS NOTES
Pt arrived to unit after surgery and nurse requested to access wound site and she refused. Informed her of the importance to evaluate wound and discuss surgical complications but she still refused for nurse to look at surgical site. Pt stated nurse didn't give her dilaudid on time. Informed  Pt that Dilaudid is as needed and she would have to request it.   Reviewed meds with pt and family

## 2017-03-25 NOTE — PROGRESS NOTES
ORTHOPAEDIC SPINE SURGERY PROGRESS NOTE    NAME:     Cesilia Martel   :       1985   MRN:       [id number]   DATE:      3/25/2017    POD:    1 Day Post-Op  S/P:    Procedure(s):  SPINE INCISION AND DEBRIDEMENT LUMBAR    SUBJECTIVE:    C/O severe back spasms  No leg pain or numbness  Denies nausea/vomiting, chest pain, headache or shortness of breath    Aspiration cultures from 3/22/17 are Negative so far  OR Cultures from yesterday just updated, showing few gram negative rods, further culture results pending    Recent Labs      17   0449   HGB  10.3*   HCT  29.8*   NA  139   K  3.9   CL  103   CO2  28   BUN  4*   CREA  0.61   GLU  97     Patient Vitals for the past 12 hrs:   BP Temp Pulse Resp SpO2   17 0840 116/79 98.3 °F (36.8 °C) 94 18 94 %   17 0430 96/55 98.5 °F (36.9 °C) 94 18 98 %   17 2331 104/61 98 °F (36.7 °C) 74 16 -       EXAM:  Dressings clean, dry and intact   Positive strength/ROM bilat lower ext. Neuro intact to sensation  Calves, soft & nontender  BL LEs NVID      PLAN:  Pt is having back pain and spasms. No LE symptoms. Pt feels that IV Valium is not as effective at this time. Will start scheduled flexeril as requested by pt and still have IV Valium prn. Pt's pain is not controlled. Will place her on a Dilaudid PCA for a day or two to adequately control her pain.      Will start PT/OT tomorrow if patient is feeling better  OOB w/ assistance  Advance regular diet      Abbi Zee, [address]  Orthopaedic Surgery  Physician Assistant to Dr. Freda Kong

## 2017-03-25 NOTE — PERIOP NOTES
TRANSFER - OUT REPORT:    Verbal report given to Palmira PALACIOS(name) on Nii Escobar  being transferred to [facility](unit) for routine post - op       Report consisted of patients Situation, Background, Assessment and   Recommendations(SBAR). Information from the following report(s) SBAR, Kardex and MAR was reviewed with the receiving nurse. Lines:   PICC Single Lumen 62/96/83 Left;Basilic (Active)       PICC Single Lumen Left (Active)   Central Line Being Utilized Yes 3/24/2017  6:00 PM   Criteria for Appropriate Use Irritant/vesicant medication 3/24/2017  6:00 PM   Site Assessment Clean, dry, & intact 3/24/2017  6:00 PM   Phlebitis Assessment 0 3/24/2017  6:00 PM   Infiltration Assessment 0 3/24/2017  6:00 PM   Date of Last Dressing Change 03/23/17 3/24/2017  2:31 PM   Dressing Status Clean, dry, & intact 3/24/2017  2:31 PM   Dressing Type Transparent 3/24/2017  2:31 PM   Hub Color/Line Status Purple; Infusing 3/24/2017 10:08 AM   Action Taken Blood drawn 3/24/2017 10:08 AM   Positive Blood Return (Site #1) Yes 3/24/2017 10:08 AM   Alcohol Cap Used Yes 3/24/2017 10:08 AM        Opportunity for questions and clarification was provided.       Patient transported with:   Registered Nurse

## 2017-03-26 LAB
ANION GAP BLD CALC-SCNC: 6 MMOL/L (ref 5–15)
BACTERIA SPEC CULT: ABNORMAL
BACTERIA SPEC CULT: NORMAL
BUN SERPL-MCNC: 5 MG/DL (ref 6–20)
BUN/CREAT SERPL: 8 (ref 12–20)
CALCIUM SERPL-MCNC: 8.1 MG/DL (ref 8.5–10.1)
CHLORIDE SERPL-SCNC: 103 MMOL/L (ref 97–108)
CO2 SERPL-SCNC: 29 MMOL/L (ref 21–32)
CREAT SERPL-MCNC: 0.65 MG/DL (ref 0.55–1.02)
ERYTHROCYTE [DISTWIDTH] IN BLOOD BY AUTOMATED COUNT: 12.2 % (ref 11.5–14.5)
GLUCOSE SERPL-MCNC: 103 MG/DL (ref 65–100)
GRAM STN SPEC: ABNORMAL
GRAM STN SPEC: ABNORMAL
GRAM STN SPEC: NORMAL
GRAM STN SPEC: NORMAL
HCT VFR BLD AUTO: 28.3 % (ref 35–47)
HGB BLD-MCNC: 9.8 G/DL (ref 11.5–16)
MCH RBC QN AUTO: 31.3 PG (ref 26–34)
MCHC RBC AUTO-ENTMCNC: 34.6 G/DL (ref 30–36.5)
MCV RBC AUTO: 90.4 FL (ref 80–99)
PLATELET # BLD AUTO: 349 K/UL (ref 150–400)
POTASSIUM SERPL-SCNC: 3.8 MMOL/L (ref 3.5–5.1)
RBC # BLD AUTO: 3.13 M/UL (ref 3.8–5.2)
SERVICE CMNT-IMP: ABNORMAL
SERVICE CMNT-IMP: NORMAL
SODIUM SERPL-SCNC: 138 MMOL/L (ref 136–145)
WBC # BLD AUTO: 5.5 K/UL (ref 3.6–11)

## 2017-03-26 PROCEDURE — 80048 BASIC METABOLIC PNL TOTAL CA: CPT | Performed by: PHYSICIAN ASSISTANT

## 2017-03-26 PROCEDURE — 97116 GAIT TRAINING THERAPY: CPT

## 2017-03-26 PROCEDURE — 74011250636 HC RX REV CODE- 250/636: Performed by: INTERNAL MEDICINE

## 2017-03-26 PROCEDURE — 74011250637 HC RX REV CODE- 250/637: Performed by: PHYSICIAN ASSISTANT

## 2017-03-26 PROCEDURE — 36415 COLL VENOUS BLD VENIPUNCTURE: CPT | Performed by: PHYSICIAN ASSISTANT

## 2017-03-26 PROCEDURE — 65270000029 HC RM PRIVATE

## 2017-03-26 PROCEDURE — 97530 THERAPEUTIC ACTIVITIES: CPT

## 2017-03-26 PROCEDURE — 74011000258 HC RX REV CODE- 258: Performed by: NURSE PRACTITIONER

## 2017-03-26 PROCEDURE — 85027 COMPLETE CBC AUTOMATED: CPT | Performed by: PHYSICIAN ASSISTANT

## 2017-03-26 PROCEDURE — 74011250636 HC RX REV CODE- 250/636: Performed by: NURSE PRACTITIONER

## 2017-03-26 PROCEDURE — 97161 PT EVAL LOW COMPLEX 20 MIN: CPT

## 2017-03-26 PROCEDURE — 74011250636 HC RX REV CODE- 250/636: Performed by: PHYSICIAN ASSISTANT

## 2017-03-26 RX ADMIN — Medication 10 ML: at 15:46

## 2017-03-26 RX ADMIN — NORETHINDRONE ACETATE AND ETHINYL ESTRADIOL 1 TABLET: KIT at 08:26

## 2017-03-26 RX ADMIN — DIAZEPAM 5 MG: 5 INJECTION, SOLUTION INTRAMUSCULAR; INTRAVENOUS at 06:20

## 2017-03-26 RX ADMIN — Medication: at 12:41

## 2017-03-26 RX ADMIN — HYDROMORPHONE HYDROCHLORIDE 1 MG: 1 INJECTION, SOLUTION INTRAMUSCULAR; INTRAVENOUS; SUBCUTANEOUS at 12:42

## 2017-03-26 RX ADMIN — CYCLOBENZAPRINE HYDROCHLORIDE 10 MG: 10 TABLET, FILM COATED ORAL at 21:11

## 2017-03-26 RX ADMIN — OXYCODONE HYDROCHLORIDE AND ACETAMINOPHEN 1 TABLET: 10; 325 TABLET ORAL at 14:09

## 2017-03-26 RX ADMIN — CYCLOBENZAPRINE HYDROCHLORIDE 10 MG: 10 TABLET, FILM COATED ORAL at 16:32

## 2017-03-26 RX ADMIN — CIPROFLOXACIN 400 MG: 2 INJECTION, SOLUTION INTRAVENOUS at 14:05

## 2017-03-26 RX ADMIN — CYCLOBENZAPRINE HYDROCHLORIDE 10 MG: 10 TABLET, FILM COATED ORAL at 08:26

## 2017-03-26 RX ADMIN — DIAZEPAM 5 MG: 5 INJECTION, SOLUTION INTRAMUSCULAR; INTRAVENOUS at 17:32

## 2017-03-26 RX ADMIN — CIPROFLOXACIN 400 MG: 2 INJECTION, SOLUTION INTRAVENOUS at 02:29

## 2017-03-26 RX ADMIN — VANCOMYCIN HYDROCHLORIDE 1000 MG: 1 INJECTION, POWDER, LYOPHILIZED, FOR SOLUTION INTRAVENOUS at 23:10

## 2017-03-26 RX ADMIN — MEROPENEM 500 MG: 500 INJECTION, POWDER, FOR SOLUTION INTRAVENOUS at 15:42

## 2017-03-26 RX ADMIN — MEROPENEM 500 MG: 500 INJECTION, POWDER, FOR SOLUTION INTRAVENOUS at 01:06

## 2017-03-26 RX ADMIN — VANCOMYCIN HYDROCHLORIDE 1000 MG: 1 INJECTION, POWDER, LYOPHILIZED, FOR SOLUTION INTRAVENOUS at 06:20

## 2017-03-26 RX ADMIN — ALTEPLASE 1 MG: 2.2 INJECTION, POWDER, LYOPHILIZED, FOR SOLUTION INTRAVENOUS at 18:17

## 2017-03-26 RX ADMIN — DIAZEPAM 5 MG: 5 INJECTION, SOLUTION INTRAMUSCULAR; INTRAVENOUS at 00:17

## 2017-03-26 RX ADMIN — OXYCODONE HYDROCHLORIDE AND ACETAMINOPHEN 1 TABLET: 10; 325 TABLET ORAL at 03:19

## 2017-03-26 RX ADMIN — MEROPENEM 500 MG: 500 INJECTION, POWDER, FOR SOLUTION INTRAVENOUS at 08:26

## 2017-03-26 RX ADMIN — Medication 10 ML: at 00:17

## 2017-03-26 RX ADMIN — MEROPENEM 500 MG: 500 INJECTION, POWDER, FOR SOLUTION INTRAVENOUS at 21:11

## 2017-03-26 RX ADMIN — VANCOMYCIN HYDROCHLORIDE 1000 MG: 1 INJECTION, POWDER, LYOPHILIZED, FOR SOLUTION INTRAVENOUS at 16:28

## 2017-03-26 RX ADMIN — Medication 10 ML: at 22:37

## 2017-03-26 RX ADMIN — Medication 10 ML: at 06:18

## 2017-03-26 RX ADMIN — OXYCODONE HYDROCHLORIDE AND ACETAMINOPHEN 1 TABLET: 10; 325 TABLET ORAL at 09:11

## 2017-03-26 RX ADMIN — OXYCODONE HYDROCHLORIDE AND ACETAMINOPHEN 1 TABLET: 10; 325 TABLET ORAL at 18:19

## 2017-03-26 NOTE — PROGRESS NOTES
Bedside shift change report given to ryanne (oncoming nurse) by Gorge hook (offgoing nurse). Report included the following information SBAR, Kardex, Intake/Output and MAR.

## 2017-03-26 NOTE — PROGRESS NOTES
Problem: Mobility Impaired (Adult and Pediatric)  Goal: *Acute Goals and Plan of Care (Insert Text)  Physical Therapy Goals  Initiated 3/26/2017    1. Patient will move from supine to sit and sit to supine , scoot up and down and roll side to side in bed with independence within 4 days. 2. Patient will perform sit to stand with independence within 4 days. 3. Patient will ambulate with independence for 200 feet with the least restrictive device within 4 days. 4. Patient will ascend/descend 4 stairs with handrail(s) with independence within 4 days. 5. Patient will verbalize and demonstrate understanding of spinal precautions (No bending, lifting greater than 5 lbs, or twisting; log-roll technique; frequent repositioning as instructed) within 4 days. PHYSICAL THERAPY EVALUATION  Patient: Chris Wilder (57 y.o. female)  Date: 3/26/2017  Primary Diagnosis: Post-operative infection  Status Post surgery infection  Procedure(s) (LRB):  SPINE INCISION AND DEBRIDEMENT LUMBAR (N/A) 2 Days Post-Op   Precautions: Log roll, Back brace when OOB, may be up to bathroom without the back brace. ASSESSMENT :  Based on the objective data described below, the patient presents with difficulty with ambulation. Patient had back surgery last 2/6/17 and I&D 3/6/17. Roll on the side of the bed with total assist x 2, supine to sit total assist x 2. Sitting balance poor. Patient very anxious and afraid to move. Sat on the edge of bed with support. Patient unable to stand due to pain and very stiff. Had pain meds prior to therapy. Assisted back to bed supine and and educate and  Instructed patient to continue active range of motion exercise on both legs while up on bed. Offered to place bed to chair position patient declined and stated she just want her back flat. Notified nurse who agreed to monitor patient. Patient will benefit from skilled intervention to address the above impairments.   Patients rehabilitation potential is considered to be Excellent  Factors which may influence rehabilitation potential include:   [ ]         None noted  [ ]         Mental ability/status  [ ]         Medical condition  [ ]         Home/family situation and support systems  [ ]         Safety awareness  [X]         Pain tolerance/management  [ ]         Other:        PLAN :  Recommendations and Planned Interventions:  [X]           Bed Mobility Training             [ ]    Neuromuscular Re-Education  [X]           Transfer Training                   [ ]    Orthotic/Prosthetic Training  [X]           Gait Training                         [ ]    Modalities  [X]           Therapeutic Exercises           [ ]    Edema Management/Control  [X]           Therapeutic Activities            [ ]    Patient and Family Training/Education  [ ]           Other (comment):     Frequency/Duration: Patient will be followed by physical therapy  twice daily to address goals. Discharge Recommendations: Rehab vs HHPT To Be Determined  Further Equipment Recommendations for Discharge: already has DME's from previous surgery       SUBJECTIVE:   Patient stated Ok we can try to sit up.       OBJECTIVE DATA SUMMARY:   HISTORY:    Past Medical History:   Diagnosis Date    Ill-defined condition       elevated cholesterol     Past Surgical History:   Procedure Laterality Date    HX ORTHOPAEDIC   02/2017     back surgery w/ Dr Ed Interiano [address] [place] [address] eye surgery     Prior Level of Function/Home Situation: Independent community ambulator without assistive device.   Personal factors and/or comorbidities impacting plan of care:      Home Situation  Home Environment: Private residence  # Steps to Enter: 4  One/Two Story Residence: One story  Living Alone: No  Support Systems: Spouse/Significant Other/Partner  Patient Expects to be Discharged to[de-identified] Private residence  Current DME Used/Available at Home: Brace/Splint     EXAMINATION/PRESENTATION/DECISION MAKING:   Critical Behavior:  Neurologic State: Alert  Orientation Level: Oriented X4  Cognition: Appropriate decision making     Hearing: Auditory  Auditory Impairment: None     Range Of Motion:  AROM: Generally decreased, functional (due to pain and very anxious.)           PROM: Generally decreased, functional (due to pain and very anxious.)           Strength:    Strength: Generally decreased, functional (due to pain and very anxious.)                    Tone & Sensation:                                  Coordination:  Coordination: Generally decreased, functional (due to pain and very anxious.)  Vision:      Functional Mobility:  Bed Mobility:  Rolling: Total assistance; Additional time;Assist x2  Supine to Sit: Total assistance; Additional time;Assist x2  Sit to Supine: Total assistance; Additional time;Assist x2  Scooting: Total assistance; Additional time;Assist x2  Transfers:                             Balance:   Sitting: Impaired; With support  Sitting - Static: Poor (constant support)  Sitting - Dynamic: Poor (constant support)  Ambulation/Gait Training:                                      Therapeutic Exercises:    Instructed patient to continue active range of motion exercise on both legs while up on chair or on bed. Functional Measure:  Barthel Index:      Bathin  Bladder: 0  Bowels: 0  Groomin  Dressin  Feeding: 10  Mobility: 0  Stairs: 0  Toilet Use: 0  Transfer (Bed to Chair and Back): 0  Total: 10         Barthel and G-code impairment scale:  Percentage of impairment CH  0% CI  1-19% CJ  20-39% CK  40-59% CL  60-79% CM  80-99% CN  100%   Barthel Score 0-100 100 99-80 79-60 59-40 20-39 1-19    0   Barthel Score 0-20 20 17-19 13-16 9-12 5-8 1-4 0      The Barthel ADL Index: Guidelines  1. The index should be used as a record of what a patient does, not as a record of what a patient could do.   2. The main aim is to establish degree of independence from any help, physical or verbal, however minor and for whatever reason. 3. The need for supervision renders the patient not independent. 4. A patient's performance should be established using the best available evidence. Asking the patient, friends/relatives and nurses are the usual sources, but direct observation and common sense are also important. However direct testing is not needed. 5. Usually the patient's performance over the preceding 24-48 hours is important, but occasionally longer periods will be relevant. 6. Middle categories imply that the patient supplies over 50 per cent of the effort. 7. Use of aids to be independent is allowed. Yashira Mederos., Barthel, D.W. (0158). Functional evaluation: the Barthel Index. [facility] (14)2. MAT Rich, Joetta Kayser., Gita Riley, Nay, [address] (1999). Measuring the change indisability after inpatient rehabilitation; comparison of the responsiveness of the Barthel Index and Functional [place] Measure. Journal of Neurology, Neurosurgery, and Psychiatry, 66(4), 431-146. Ezekiel Biggs N.J.LUZ, ELIZABETH Figueroa, & Mirna Merrill MHORACE. (2004.) Assessment of post-stroke quality of life in cost-effectiveness studies: The usefulness of the Barthel Index and the EuroQoL-5D. Quality of Life Research, 13, 633-33         G codes: In compliance with CMSs Claims Based Outcome Reporting, the following G-code set was chosen for this patient based on their primary functional limitation being treated: The outcome measure chosen to determine the severity of the functional limitation was the Barthel index with a score of 10/100 which was correlated with the impairment scale.       · Mobility - Walking and Moving Around:               - CURRENT STATUS:    CM - 80%-99% impaired, limited or restricted               - GOAL STATUS:           CL - 60%-79% impaired, limited or restricted               - D/C STATUS:                       ---------------To be determined--------------- Physical Therapy Evaluation Charge Determination   History Examination Presentation Decision-Making   LOW Complexity : Zero comorbidities / personal factors that will impact the outcome / POC LOW Complexity : 1-2 Standardized tests and measures addressing body structure, function, activity limitation and / or participation in recreation  LOW Complexity : Stable, uncomplicated  Other outcome measures barthel  HIGH       Based on the above components, the patient evaluation is determined to be of the following complexity level: LOW      Pain:  Pain Scale 1: Numeric (0 - 10)  Pain Intensity 1: 3  Pain Location 1: Back  Pain Orientation 1: Lower  Pain Description 1: Sore  Pain Intervention(s) 1: Medication (see MAR)  Activity Tolerance:   Good. Please refer to the flowsheet for vital signs taken during this treatment. After treatment:   [ ]         Patient left in no apparent distress sitting up in chair  [X]         Patient left in no apparent distress in bed  [X]         Call bell left within reach  [X]         Nursing notified  [X]         Caregiver present  [ ]         Bed alarm activated      COMMUNICATION/EDUCATION:   The patients plan of care was discussed with: Registered Nurse and patient. [X]         Fall prevention education was provided and the patient/caregiver indicated understanding. [X]         Patient/family have participated as able in goal setting and plan of care. [X]         Patient/family agree to work toward stated goals and plan of care. [ ]         Patient understands intent and goals of therapy, but is neutral about his/her participation. [ ]         Patient is unable to participate in goal setting and plan of care. Thank you for this referral.  Sly Rudd, PT,WCC.    Time Calculation: 25 mins

## 2017-03-26 NOTE — PROGRESS NOTES
Bedside shift change report given to ryanne (oncoming nurse) by Anthony hook (offgoing nurse). Report included the following information SBAR, Kardex, Intake/Output and MAR.

## 2017-03-26 NOTE — ROUTINE PROCESS
Orthopedic & Surgical Nursing Communication Tool       7:59 AM  3/26/2017    Bedside and Verbal shift change report given to [facility], RN (incoming nurse) by Soledad Sanchez RN (outgoing nurse) on Ellen Patel a 33 y.o. female who was admitted on 3/21/2017  3:40 PM. Report included the following information SBAR, Kardex and MAR. Significant changes during shift: patient's pain better controlled by PCA.      Issues for physician to address: none            Code Status: No Order     Infections: No current active infections     Allergies: Review of patient's allergies indicates no known allergies. Diet: DIET REGULAR         Admission Date 3/21/2017   Admission Diagnosis Post-operative infection  Status Post surgery infection     Consults IP CONSULT TO INFECTIOUS DISEASES            Consults   [x]PT   []OT   []Speech   []Case Management      [] Rehab      Cardiac Monitoring Order   []Yes   [x]No         DVT Prophylaxis   SCDs:  Sequential Compression Device: Bilateral              Jorge stockings:            [] Meds   []Contraindicated   []None        Activity Level Activity Level: Up with Assistance      Activity Assistance: Partial (one person)     Purposeful Rounding every 1-2 hour?    [x]Yes   Georges Score  Total Score: 3     Bed Alarm (If score 3 or >)   []Yes   [] Refused (See signed refusal form in chart)   Sony Score  Sony Score: 20       Sony Score (if score 14 or less)   []PMT consult   []Wound Care consult      []Specialty bed   [] Nutrition consult      LBM: Last Bowel Movement Date: 03/23/17        Influenza Vaccine Received Flu Vaccine for Current Season (usually Sept-March): Yes        DIET Active Orders   Diet    DIET REGULAR      LDA PICC Single Lumen Left (Active)   Central Line Being Utilized Yes 3/26/2017  3:20 AM   Criteria for Appropriate Use Long term IV/antibiotic administration 3/26/2017  3:20 AM   Site Assessment Clean, dry, & intact 3/26/2017  3:20 AM   Phlebitis Assessment 0 3/26/2017  3:20 AM   Infiltration Assessment 0 3/26/2017  3:20 AM   Date of Last Dressing Change 03/23/17 3/25/2017  8:22 PM   Dressing Status Clean, dry, & intact 3/26/2017  3:20 AM   Dressing Type Disk with Chlorhexadine gluconate (CHG); Transparent 3/26/2017  3:20 AM   Hub Color/Line Status Purple; Infusing 3/26/2017  3:20 AM   Action Taken Open ports on tubing capped 3/26/2017  3:20 AM   Positive Blood Return (Site #1) Yes 3/26/2017  3:20 AM   Alcohol Cap Used Yes 3/25/2017  3:37 PM      Urinary Catheter Urinary Catheter 03/24/17 2- way-Criteria for Appropriate Use: Surgical procedure   Intake & Output   Date 03/25/17 0700 - 03/26/17 0659 03/26/17 0700 - 03/27/17 0659   Shift 0801-8649 1867-9557 24 Hour Total 9169-9384 3405-3707 24 Hour Total   I  N  T  A  K  E   P.O. 240 170 410         P. O. 240 170 410       I.V.  (mL/kg/hr)  4000  (6.7) 4000  (3.3)         Volume (0.9% sodium chloride infusion)  2400 2400         Volume (ciprofloxacin (CIPRO) 400 mg IVPB (premix))  400 400         Volume (meropenem (MERREM) 500 mg in 0.9% sodium chloride (MBP/ADV) 50 mL)  200 200         Volume (vancomycin (VANCOCIN) 1,000 mg in 0.9% sodium chloride (MBP/ADV) 250 mL)  1000 1000       Shift Total  (mL/kg) 240  (4.8) 4170  (83.6) 4410  (88.4)      O  U  T  P  U  T   Urine  (mL/kg/hr) 3900  (6.5) 3875  (6.5) 7775  (6.5)         Urine Output (mL) (Urinary Catheter 03/24/17 2- way) 3900 3875 7775       Drains 0  0         Output (ml) (Hemovac Lower Back) 0  0       Shift Total  (mL/kg) 3900  (78.2) 3875  (77.7) 7775  (155.8)      NET -9429 036 -8230      Weight (kg) 49.9 49.9 49.9 49.9 49.9 49.9        Expected Length of Stay 3d 16h   Actual Length of Stay 5       Opportunity for questions and clarifications were given to the incoming nurse. Patient's bed is in low position, side rails x2, door open PRN, call bell within reach and patient not in distress.       Jorge Paez RN

## 2017-03-26 NOTE — PROGRESS NOTES
Orthopedic Spine Progress Note  Post Op day: 2 Days Post-Op    2017 8:15 AM     Greg Robles    Vital Signs:    Patient Vitals for the past 8 hrs:   BP Temp Pulse Resp SpO2   17 0716 101/59 98.7 °F (37.1 °C) 88 16 96 %   17 0447 102/58 98.6 °F (37 °C) 87 14 93 %     Temp (24hrs), Av.4 °F (36.9 °C), Min:97.4 °F (36.3 °C), Max:98.7 °F (37.1 °C)      Intake/Output:      1901 -  0700  In: 7978.3 [P.O.:650; I.V.:7328.3]  Out: 02083 [USHO:16883; Drains:15]    Pain Control:   Pain Assessment  Pain Scale 1: Numeric (0 - 10)  Pain Intensity 1: 4  Pain Onset 1: surgery  Pain Location 1: Back  Pain Orientation 1: Lower  Pain Description 1: Sore  Pain Intervention(s) 1: Medication (see MAR)    LAB:    Recent Labs      17   0327   HCT  28.3*   HGB  9.8*     Lab Results   Component Value Date/Time    Sodium 138 2017 03:27 AM    Potassium 3.8 2017 03:27 AM    Chloride 103 2017 03:27 AM    CO2 29 2017 03:27 AM    Glucose 103 2017 03:27 AM    BUN 5 2017 03:27 AM    Creatinine 0.65 2017 03:27 AM    Calcium 8.1 2017 03:27 AM       Subjective:  Greg Robles is a 33 y.o. female s/p a  Procedure(s):  SPINE INCISION AND DEBRIDEMENT LUMBAR   Procedure(s):  SPINE INCISION AND DEBRIDEMENT LUMBAR. Tolerating diet. Objective: General: alert, cooperative, no distress. Gastrointestinal:  Soft, non-tender. Neurological: Neurovascular exam within normal limits. Sensation stable. Motor: unchanged C5-T1 and L2-S1. Musculoskeletal:  Elio's sign negative in bilateral lower extremities. Calves soft, supple, non-tender upon palpation or with passive stretch. Skin: Incision - clean, dry and intact. No significant erythema or swelling.     Dressing: clean, dry, and intact     PT/OT:   Gait:                      Assessment:    s/p Procedure(s):  SPINE INCISION AND DEBRIDEMENT LUMBAR    Active Problems:    Post-operative infection (3/21/2017) Plan:     1. Continue PT/OT  2. Continue established methods of pain control  3. VTE Prophylaxes - TEDS &/or SCDs   Cont orourke and dilaudid PCA           Discharge To:       Signed By: Amira Arredondo MD

## 2017-03-27 LAB
ANION GAP BLD CALC-SCNC: 5 MMOL/L (ref 5–15)
BACTERIA SPEC CULT: NORMAL
BACTERIA SPEC CULT: NORMAL
BUN SERPL-MCNC: 6 MG/DL (ref 6–20)
BUN/CREAT SERPL: 9 (ref 12–20)
CALCIUM SERPL-MCNC: 8.3 MG/DL (ref 8.5–10.1)
CHLORIDE SERPL-SCNC: 103 MMOL/L (ref 97–108)
CO2 SERPL-SCNC: 30 MMOL/L (ref 21–32)
CREAT SERPL-MCNC: 0.64 MG/DL (ref 0.55–1.02)
ERYTHROCYTE [DISTWIDTH] IN BLOOD BY AUTOMATED COUNT: 12.3 % (ref 11.5–14.5)
GLUCOSE SERPL-MCNC: 89 MG/DL (ref 65–100)
HCT VFR BLD AUTO: 29.4 % (ref 35–47)
HGB BLD-MCNC: 9.7 G/DL (ref 11.5–16)
MCH RBC QN AUTO: 30.7 PG (ref 26–34)
MCHC RBC AUTO-ENTMCNC: 33 G/DL (ref 30–36.5)
MCV RBC AUTO: 93 FL (ref 80–99)
PLATELET # BLD AUTO: 341 K/UL (ref 150–400)
POTASSIUM SERPL-SCNC: 4 MMOL/L (ref 3.5–5.1)
RBC # BLD AUTO: 3.16 M/UL (ref 3.8–5.2)
SERVICE CMNT-IMP: NORMAL
SERVICE CMNT-IMP: NORMAL
SODIUM SERPL-SCNC: 138 MMOL/L (ref 136–145)
WBC # BLD AUTO: 4.5 K/UL (ref 3.6–11)

## 2017-03-27 PROCEDURE — 65270000029 HC RM PRIVATE

## 2017-03-27 PROCEDURE — 74011250636 HC RX REV CODE- 250/636: Performed by: PHYSICIAN ASSISTANT

## 2017-03-27 PROCEDURE — 74011000258 HC RX REV CODE- 258: Performed by: INTERNAL MEDICINE

## 2017-03-27 PROCEDURE — 85027 COMPLETE CBC AUTOMATED: CPT | Performed by: PHYSICIAN ASSISTANT

## 2017-03-27 PROCEDURE — 74011250637 HC RX REV CODE- 250/637: Performed by: NURSE PRACTITIONER

## 2017-03-27 PROCEDURE — 74011250636 HC RX REV CODE- 250/636: Performed by: INTERNAL MEDICINE

## 2017-03-27 PROCEDURE — 80048 BASIC METABOLIC PNL TOTAL CA: CPT | Performed by: PHYSICIAN ASSISTANT

## 2017-03-27 PROCEDURE — 36415 COLL VENOUS BLD VENIPUNCTURE: CPT | Performed by: PHYSICIAN ASSISTANT

## 2017-03-27 PROCEDURE — 74011250637 HC RX REV CODE- 250/637: Performed by: PHYSICIAN ASSISTANT

## 2017-03-27 PROCEDURE — 74011250636 HC RX REV CODE- 250/636: Performed by: NURSE PRACTITIONER

## 2017-03-27 PROCEDURE — 97530 THERAPEUTIC ACTIVITIES: CPT

## 2017-03-27 PROCEDURE — 97116 GAIT TRAINING THERAPY: CPT

## 2017-03-27 PROCEDURE — 74011000258 HC RX REV CODE- 258: Performed by: NURSE PRACTITIONER

## 2017-03-27 RX ORDER — CIPROFLOXACIN 500 MG/1
500 TABLET ORAL EVERY 12 HOURS
Status: DISCONTINUED | OUTPATIENT
Start: 2017-03-28 | End: 2017-04-04 | Stop reason: HOSPADM

## 2017-03-27 RX ORDER — ADHESIVE BANDAGE
15 BANDAGE TOPICAL DAILY PRN
Status: DISCONTINUED | OUTPATIENT
Start: 2017-03-27 | End: 2017-03-31

## 2017-03-27 RX ORDER — METAXALONE 800 MG/1
800 TABLET ORAL 3 TIMES DAILY
Status: DISCONTINUED | OUTPATIENT
Start: 2017-03-27 | End: 2017-03-28

## 2017-03-27 RX ADMIN — MEROPENEM 500 MG: 500 INJECTION, POWDER, FOR SOLUTION INTRAVENOUS at 08:22

## 2017-03-27 RX ADMIN — Medication 10 ML: at 05:41

## 2017-03-27 RX ADMIN — CIPROFLOXACIN 400 MG: 2 INJECTION, SOLUTION INTRAVENOUS at 15:09

## 2017-03-27 RX ADMIN — MEROPENEM 500 MG: 500 INJECTION, POWDER, FOR SOLUTION INTRAVENOUS at 13:46

## 2017-03-27 RX ADMIN — OXYCODONE HYDROCHLORIDE AND ACETAMINOPHEN 1 TABLET: 10; 325 TABLET ORAL at 20:12

## 2017-03-27 RX ADMIN — CEFEPIME 2 G: 2 INJECTION, POWDER, FOR SOLUTION INTRAVENOUS at 16:28

## 2017-03-27 RX ADMIN — CYCLOBENZAPRINE HYDROCHLORIDE 10 MG: 10 TABLET, FILM COATED ORAL at 15:09

## 2017-03-27 RX ADMIN — OXYCODONE HYDROCHLORIDE AND ACETAMINOPHEN 1 TABLET: 10; 325 TABLET ORAL at 12:49

## 2017-03-27 RX ADMIN — DIAZEPAM 5 MG: 5 INJECTION, SOLUTION INTRAMUSCULAR; INTRAVENOUS at 22:33

## 2017-03-27 RX ADMIN — SODIUM CHLORIDE 100 ML/HR: 900 INJECTION, SOLUTION INTRAVENOUS at 15:26

## 2017-03-27 RX ADMIN — CIPROFLOXACIN 400 MG: 2 INJECTION, SOLUTION INTRAVENOUS at 04:09

## 2017-03-27 RX ADMIN — METAXALONE 800 MG: 800 TABLET ORAL at 20:12

## 2017-03-27 RX ADMIN — CYCLOBENZAPRINE HYDROCHLORIDE 10 MG: 10 TABLET, FILM COATED ORAL at 08:22

## 2017-03-27 RX ADMIN — Medication: at 21:24

## 2017-03-27 RX ADMIN — OXYCODONE HYDROCHLORIDE AND ACETAMINOPHEN 1 TABLET: 5; 325 TABLET ORAL at 16:29

## 2017-03-27 RX ADMIN — VANCOMYCIN HYDROCHLORIDE 1000 MG: 1 INJECTION, POWDER, LYOPHILIZED, FOR SOLUTION INTRAVENOUS at 06:05

## 2017-03-27 RX ADMIN — CEFEPIME 2 G: 2 INJECTION, POWDER, FOR SOLUTION INTRAVENOUS at 22:33

## 2017-03-27 RX ADMIN — DIAZEPAM 5 MG: 5 INJECTION, SOLUTION INTRAMUSCULAR; INTRAVENOUS at 00:23

## 2017-03-27 RX ADMIN — MAGNESIUM HYDROXIDE 15 ML: 400 SUSPENSION ORAL at 15:09

## 2017-03-27 RX ADMIN — HYDROMORPHONE HYDROCHLORIDE 1 MG: 1 INJECTION, SOLUTION INTRAMUSCULAR; INTRAVENOUS; SUBCUTANEOUS at 12:03

## 2017-03-27 RX ADMIN — Medication 10 ML: at 13:46

## 2017-03-27 RX ADMIN — OXYCODONE HYDROCHLORIDE AND ACETAMINOPHEN 1 TABLET: 10; 325 TABLET ORAL at 05:41

## 2017-03-27 RX ADMIN — DIAZEPAM 5 MG: 5 INJECTION, SOLUTION INTRAMUSCULAR; INTRAVENOUS at 10:39

## 2017-03-27 RX ADMIN — MEROPENEM 500 MG: 500 INJECTION, POWDER, FOR SOLUTION INTRAVENOUS at 02:22

## 2017-03-27 RX ADMIN — OXYCODONE HYDROCHLORIDE AND ACETAMINOPHEN 1 TABLET: 10; 325 TABLET ORAL at 02:16

## 2017-03-27 NOTE — PROGRESS NOTES
Problem: Mobility Impaired (Adult and Pediatric)  Goal: *Acute Goals and Plan of Care (Insert Text)  Physical Therapy Goals  Initiated 3/26/2017    1. Patient will move from supine to sit and sit to supine , scoot up and down and roll side to side in bed with independence within 4 days. 2. Patient will perform sit to stand with independence within 4 days. 3. Patient will ambulate with independence for 200 feet with the least restrictive device within 4 days. 4. Patient will ascend/descend 4 stairs with handrail(s) with independence within 4 days. 5. Patient will verbalize and demonstrate understanding of spinal precautions (No bending, lifting greater than 5 lbs, or twisting; log-roll technique; frequent repositioning as instructed) within 4 days. PHYSICAL THERAPY TREATMENT  Patient: Lesia Harkins (69 y.o. female)  Date: 3/27/2017  Diagnosis: Post-operative infection  Status Post surgery infection <principal problem not specified>  Procedure(s) (LRB):  SPINE INCISION AND DEBRIDEMENT LUMBAR (N/A) 3 Days Post-Op  Precautions:        ASSESSMENT:  Pt received in bed, sidelying. Requesting PT to attempt sitting EOB.  at bedside. Sidelying >sitting Max A x2 max verbal cues for sequencing. Sit<>stand using RW with Max A x2. Total A to don LSO. Pt required max A for placement of hands EOB and on walker. Performed 3 steps fwd/bkwd x2 and sideways toward [facility] with max A x2 to manage walker and verbal cues for sequencing. No buckling of knees,noted however patient states \"no strength\" and demonstrates increased WB on BUE. Returned to bed with max Ax2. Pt reports 10/10 pain, nursing notified. Progression toward goals:  [ ]      Improving appropriately and progressing toward goals  [X]      Improving slowly and progressing toward goals  [ ]      Not making progress toward goals and plan of care will be adjusted       PLAN:  Patient continues to benefit from skilled intervention to address the above impairments. Continue treatment per established plan of care. Discharge Recommendations:  Rehab  Further Equipment Recommendations for Discharge:  none       SUBJECTIVE:   Patient stated i wait to try.    The patient stated 3/3 back precautions. Reviewed all 3 with patient. OBJECTIVE DATA SUMMARY:   Critical Behavior:  Neurologic State: Alert  Orientation Level: Oriented X4  Cognition: Appropriate decision making     Functional Mobility Training:  Bed Mobility:  Log Rolling: Maximum assistance;Assist x2  Supine to Sit: Maximum assistance;Assist x2  Sit to Supine: Maximum assistance;Assist x2  Scooting: Maximum assistance        Brace donned with  total assistance   Transfers:                                   Balance:     Ambulation/Gait Training:  Distance (ft): 5 Feet (ft)  Assistive Device: Walker, rolling;Gait belt;Brace/Splint  Ambulation - Level of Assistance: Maximum assistance;Assist x2                                                          Stairs: Therapeutic Exercises:      Pain:  Pain Scale 1: Numeric (0 - 10)  Pain Intensity 1: 4  Pain Location 1: Back  Pain Orientation 1: Lower  Pain Description 1: Aching  Pain Intervention(s) 1: Medication (see MAR)  Activity Tolerance:   Poor  Please refer to the flowsheet for vital signs taken during this treatment.   After treatment:   [ ]  Patient left in no apparent distress sitting up in chair  [X]  Patient left in no apparent distress in bed  [X]  Call bell left within reach  [X]  Nursing notified  [X]  Caregiver present  [ ]  Bed alarm activated      COMMUNICATION/COLLABORATION:   The patients plan of care was discussed with: Registered Nurse     Ihsan San   Time Calculation: 25 mins

## 2017-03-27 NOTE — PROGRESS NOTES
[facility] Infectious Diseases Outpatient  IV Antibiotic Orders    1. Diagnosis:  Lspine Sx site Enterobacter infection  2. Routine PICC/ Fransisca/ Portacath Care including PRN cath-flow/activase to declot   3. Antibiotic:  Cefepime 2gm Iv q 8 hours through 5/4/17                           Ciprofloxacin 500mg PO twice a day through 5/4/17      4. Last labs  Lab Results   Component Value Date/Time    WBC 4.5 03/27/2017 02:25 AM    HGB 9.7 03/27/2017 02:25 AM    HCT 29.4 03/27/2017 02:25 AM    PLATELET 278 90/34/9432 02:25 AM    MCV 93.0 03/27/2017 02:25 AM     Lab Results   Component Value Date/Time    Sodium 138 03/27/2017 02:25 AM    Potassium 4.0 03/27/2017 02:25 AM    Chloride 103 03/27/2017 02:25 AM    CO2 30 03/27/2017 02:25 AM    Anion gap 5 03/27/2017 02:25 AM    Glucose 89 03/27/2017 02:25 AM    BUN 6 03/27/2017 02:25 AM    Creatinine 0.64 03/27/2017 02:25 AM    BUN/Creatinine ratio 9 03/27/2017 02:25 AM    GFR est AA >60 03/27/2017 02:25 AM    GFR est non-AA >60 03/27/2017 02:25 AM    Calcium 8.3 03/27/2017 02:25 AM    Bilirubin, total 0.2 03/21/2017 04:26 PM    AST (SGOT) 21 03/21/2017 04:26 PM    Alk. phosphatase 112 03/21/2017 04:26 PM    Protein, total 8.0 03/21/2017 04:26 PM    Albumin 3.0 03/21/2017 04:26 PM    Globulin 5.0 03/21/2017 04:26 PM    A-G Ratio 0.6 03/21/2017 04:26 PM    ALT (SGPT) 40 03/21/2017 04:26 PM         5. _+__ Weekly Labs:           _+__CBC/diff/platelets   _+__AST/ALT/Alk phos/   _+__BUN/CRT   _+__ESR   _+__CRP         6. Fax Final lab results and critical values to Shae @[phone]  7. Call urgent lab results to [phone]. Allergies:  No Known Allergies  9. Pharmacy: [facility]          Home Health Nursing:  10. Pharmacy Consult for Vancomycin/Aminoglycoside Dosing  11. First Dose protocol as needed.    12. Please pull PIC line at end of therapy or send to IR for [facility] removal     Home Health: Call Angio at [facility] to schedule Fransisca Removal :  P: [phone]    Fax: [phone] or [phone]      Le Romberg, MD

## 2017-03-27 NOTE — PROGRESS NOTES
[facility] Infectious Diseases Progress Note  Shira Bernardo MD,             Rob Boone MD,          Terry Omari [address]    [address], [address]  [phone]  Fax    3/27/2017      Assessment & Plan: Cefepime, Cipro   1. Postoperative wound infection, status post recent L5-S1 microdiskectomy. S/p  I&D 2017. Intraoperative cultures growing enterobacter aeruginosa. Patient discharged on 6 week course of Ertapenem. Now returns with new fluid collection/abscess. Underwent aspiration by IR 3/22 and then another I&D 3/24, so far cultures showing Enterobacter again. Discontinue Vancomycin, Meropenem.  This time will try combination of Cefepime IV and PO Cipro through 17, for additional 6 weeks from last Sx , d/w patient and family at bedside.           Subjective:     Feeling better    Objective:     Vitals:   Visit Vitals    /69 (BP 1 Location: Right arm, BP Patient Position: At rest)    Pulse 89    Temp 99 °F (37.2 °C)    Resp 18    Ht 5' 3\" (1.6 m)    Wt 49.9 kg (110 lb)    LMP 2017    SpO2 100%    BMI 19.49 kg/m2        Tmax:  Temp (24hrs), Av.2 °F (36.8 °C), Min:97.8 °F (36.6 °C), Max:99 °F (37.2 °C)      Exam:   GENERAL ASSESSMENT: NAD  HEENT: Nontraumatic NCAT  CHEST: no distress  HEART: rrr s1s2  SKIN:  No rash:   : Prabhakar: no  EXTREMITY: no edema  NEURO:Grossly non focal    Labs:        Recent Labs      17   0225  17   0327  17   0449   NA  138  138  139   K  4.0  3.8  3.9   CL  103  103  103   CO2  30  29  28   BUN  6  5*  4*   CREA  0.64  0.65  0.61   GLU  89  103*  97   WBC  4.5  5.5  5.5   HGB  9.7*  9.8*  10.3*   HCT  29.4*  28.3*  29.8*   PLT  341  349  362         Cultures:     No results found for: SDES  Lab Results   Component Value Date/Time    Culture result: FEW  ENTEROBACTER AEROGENES   2017 04:47 PM    Culture result: NO ANAEROBES ISOLATED 2017 04:47 PM    Culture result: NO GROWTH 4 DAYS 03/22/2017 01:05 PM    Culture result: NO GROWTH 4 DAYS 03/22/2017 01:05 PM    Culture result: NO GROWTH 5 DAYS 03/22/2017 01:05 PM       Radiology:     Medications       Current Facility-Administered Medications   Medication Dose Route Frequency Last Dose    cefepime (MAXIPIME) 2 g in 0.9% sodium chloride (MBP/ADV) 100 mL  2 g IntraVENous Q8H 2 g at 03/27/17 1628    magnesium hydroxide (MILK OF MAGNESIA) 400 mg/5 mL oral suspension 15 mL  15 mL Oral DAILY PRN 15 mL at 03/27/17 1509    metaxalone (SKELAXIN) tablet 800 mg  800 mg Oral TID      alteplase (CATHFLO) injection 1 mg  1 mg InterCATHeter PRN 1 mg at 03/26/17 1817    HYDROmorphone (PF) 15 mg/30 ml (DILAUDID) PCA   IntraVENous TITRATE      diazePAM (VALIUM) injection 5 mg  5 mg IntraVENous Q6H PRN 5 mg at 03/27/17 1039    HYDROmorphone (PF) (DILAUDID) injection 1 mg  1 mg IntraVENous Q4H PRN 1 mg at 03/27/17 1203    ciprofloxacin (CIPRO) 400 mg IVPB (premix)  400 mg IntraVENous Q12H 400 mg at 03/27/17 1509    norethindrone-ethinyl estradiol (JUNEL FE 1/20) 1 mg-20 mcg (21)/75 mg (7) per tablet 1 Tab  1 Tab Oral DAILY 1 Tab at 03/26/17 0826    promethazine (PHENERGAN) tablet 25 mg  25 mg Oral Q6H PRN 25 mg at 03/22/17 1945    0.9% sodium chloride infusion  100 mL/hr IntraVENous CONTINUOUS 100 mL/hr at 03/27/17 1526    oxyCODONE-acetaminophen (PERCOCET) 5-325 mg per tablet 1 Tab  1 Tab Oral Q4H PRN 1 Tab at 03/27/17 1629    oxyCODONE-acetaminophen (PERCOCET 10)  mg per tablet 1 Tab  1 Tab Oral Q4H PRN 1 Tab at 03/27/17 1249    sodium chloride (NS) flush 5-10 mL  5-10 mL IntraVENous Q8H 10 mL at 03/27/17 1346    sodium chloride (NS) flush 5-10 mL  5-10 mL IntraVENous PRN      acetaminophen (TYLENOL) tablet 650 mg  650 mg Oral Q4H PRN      naloxone (NARCAN) injection 0.4 mg  0.4 mg IntraVENous PRN      diphenhydrAMINE (BENADRYL) injection 12.5 mg  12.5 mg IntraVENous Q4H PRN             Case discussed with:  RN, Patient and family at bedside    Stephany Timmons MD

## 2017-03-27 NOTE — PROGRESS NOTES
Problem: Mobility Impaired (Adult and Pediatric)  Goal: *Acute Goals and Plan of Care (Insert Text)  Physical Therapy Goals  Initiated 3/26/2017    1. Patient will move from supine to sit and sit to supine , scoot up and down and roll side to side in bed with independence within 4 days. 2. Patient will perform sit to stand with independence within 4 days. 3. Patient will ambulate with independence for 200 feet with the least restrictive device within 4 days. 4. Patient will ascend/descend 4 stairs with handrail(s) with independence within 4 days. 5. Patient will verbalize and demonstrate understanding of spinal precautions (No bending, lifting greater than 5 lbs, or twisting; log-roll technique; frequent repositioning as instructed) within 4 days. PHYSICAL THERAPY TREATMENT  Patient: Maria Del Carmen Soto (73 y.o. female)  Date: 3/27/2017  Diagnosis: Post-operative infection  Status Post surgery infection <principal problem not specified>  Procedure(s) (LRB):  SPINE INCISION AND DEBRIDEMENT LUMBAR (N/A) 3 Days Post-Op  Precautions:        ASSESSMENT:  Pt received in bed, hesitant to participate with physical therapy. Agreeable with spouse encouragement and explanation of slow progression. Max A x 2 for log rolling bed mobility with increased time to complete task. With attempt sidelying >sitting EOB pt experienced increased back spasm and requested to return to flat. Knees supported on pillows for comfort. Pt tearful, requested Valium and asked to try again later.  present and supported throughout session. Progression toward goals:  [ ]      Improving appropriately and progressing toward goals  [X]      Improving slowly and progressing toward goals  [ ]      Not making progress toward goals and plan of care will be adjusted       PLAN:  Patient continues to benefit from skilled intervention to address the above impairments. Continue treatment per established plan of care.   Discharge Recommendations: Rehab  Further Equipment Recommendations for Discharge:  none       SUBJECTIVE:   Patient stated I have had a difficult moving this time.    The patient stated 3/3 back precautions. Reviewed all 3 with patient. OBJECTIVE DATA SUMMARY:   Critical Behavior:  Neurologic State: Alert  Orientation Level: Oriented X4  Cognition: Appropriate decision making     Functional Mobility Training:  Bed Mobility:  Log Rolling: Maximum assistance;Assist x2                    Transfers:                                   Balance:     Ambulation/Gait Training:                                                                   Stairs: Therapeutic Exercises:      Pain:  Pain Scale 1: Numeric (0 - 10)  Pain Intensity 1: 4  Pain Location 1: Back  Pain Orientation 1: Lower  Pain Description 1: Aching  Pain Intervention(s) 1: Medication (see MAR)  Activity Tolerance:   Poor  Please refer to the flowsheet for vital signs taken during this treatment.   After treatment:   [ ]  Patient left in no apparent distress sitting up in chair  [X]  Patient left in no apparent distress in bed  [X]  Call bell left within reach  [X]  Nursing notified  [X]  Caregiver present  [ ]  Bed alarm activated      COMMUNICATION/COLLABORATION:   The patients plan of care was discussed with: Eric Deras   Time Calculation: 20 mins

## 2017-03-27 NOTE — PROGRESS NOTES
Problem: Mobility Impaired (Adult and Pediatric)  Goal: *Acute Goals and Plan of Care (Insert Text)  Physical Therapy Goals  Initiated 3/26/2017    1. Patient will move from supine to sit and sit to supine , scoot up and down and roll side to side in bed with independence within 4 days. 2. Patient will perform sit to stand with independence within 4 days. 3. Patient will ambulate with independence for 200 feet with the least restrictive device within 4 days. 4. Patient will ascend/descend 4 stairs with handrail(s) with independence within 4 days. 5. Patient will verbalize and demonstrate understanding of spinal precautions (No bending, lifting greater than 5 lbs, or twisting; log-roll technique; frequent repositioning as instructed) within 4 days. PHYSICAL THERAPY TREATMENT  Patient: Gerardo Aparicio (30 y.o. female)  Date: 3/27/2017  Precautions:  neutral spine, brace when OOB      ASSESSMENT:  Patient continues to have considerable pain that limits her mobility severely. With increased time and assist of 2, she was able to get to the EOB and sit using her arms to prop herself up. All the while, she had severe pain in her back, hips and lower abdominal area. Her pain was slightly relieved by the application of the brace, and she was able to stand with the walker. She remained standing for quite a few minutes and take some side steps towards the head of the bed. To return to supine, she again needed max assist of 2 to attempt to control her pain. Once in supine, used pillows to support her legs in external rotation, flexion, abduction which appeared to alleviate her pain slightly. Note that her abdomen is rounded and protruded, though she states she has been passing gas well and denies any nausea. Dicussed at length with family and patient some different methods to attempt to acclimate her to a more upright sitting position.   Disposition recommendation is based on her progress with therapy, but at this time she needs total assist.  Progression toward goals:  [X]      Improving appropriately and progressing toward goals  [X]      Improving slowly and progressing toward goals  [ ]      Not making progress toward goals and plan of care will be adjusted       PLAN:  Patient continues to benefit from skilled intervention to address the above impairments. Continue treatment per established plan of care. Discharge Recommendations: To Be Determined  Further Equipment Recommendations for Discharge: To be determined       SUBJECTIVE:   Patient stated I don't know what I can do, I am still having so many spasms.    The patient stated 3/3 back precautions. Reviewed all 3 with patient. OBJECTIVE DATA SUMMARY:   Functional Mobility Training:  Bed Mobility:  Log Rolling: Maximum assistance (due to pain)  Supine to Sit: Maximum assistance;Assist x2; Additional time (log roll and limited by pain)  Sit to Supine: Maximum assistance; Additional time;Assist x2 (log roll)  Scooting: Maximum assistance        Brace donned with  total assistance LSO  Transfers:  Sit to Stand: Moderate assistance; Additional time; Adaptive equipment;Assist x1  Stand to Sit: Minimum assistance; Additional time; Adaptive equipment                             Ambulation/Gait Training:  Distance (ft): 5 Feet (ft)  Assistive Device: Walker, rolling;Gait belt;Brace/Splint  Ambulation - Level of Assistance: Maximum assistance;Assist x2                                                        Side steps to the head of the bed only  Stairs: Therapeutic Exercises:   Gentle AROM of legs prior to moving to sitting  Pain:  Pain Scale 1: Numeric (0 - 10)  Pain Intensity 1: 10  Pain Location 1: Back  Pain Orientation 1: Lower  Pain Description 1: spasming  Pain Intervention(s) 1: Medication (see MAR)  Activity Tolerance:   Limited by pain in all mobility  Please refer to the flowsheet for vital signs taken during this treatment.   After treatment:   [ ] Patient left in no apparent distress sitting up in chair  [X]  Patient left in no apparent distress in bed  [X]  Call bell left within reach  [X]  Nursing notified  [X]  Caregiver present, spouse  [ ]  Bed alarm activated      COMMUNICATION/COLLABORATION:   The patients plan of care was discussed with: Physical Therapy Assistant and Registered Nurse     Jett Marcelo PT   Time Calculation: 40 mins

## 2017-03-27 NOTE — PROGRESS NOTES
ORTHOPAEDIC SPINE SURGERY PROGRESS NOTE    NAME:     Marybeth Ball   :       1985   MRN:       [id number]   DATE:      3/27/2017    POD:    3 Days Post-Op  S/P:    Procedure(s):  SPINE INCISION AND DEBRIDEMENT LUMBAR    SUBJECTIVE:    C/O back soreness and occasional spasms  No leg pain or numbness  Denies nausea/vomiting, chest pain, headache or shortness of breath  Pain controlled    OR cultures from 3/24/17 are + for enterobacter (pan-sensitive)    Recent Labs      17   0225   HGB  9.7*   HCT  29.4*   NA  138   K  4.0   CL  103   CO2  30   BUN  6   CREA  0.64   GLU  89     Patient Vitals for the past 12 hrs:   BP Temp Pulse Resp SpO2   17 0500 107/67 97.8 °F (36.6 °C) 82 18 98 %   17 2100 100/64 97.9 °F (36.6 °C) 70 18 97 %       EXAM:  Dressings intact   Positive strength/ROM bilat lower ext.   Neuro intact to sensation  Calves, soft & nontender  BL LEs NVID      PLAN:  Continue pain medications as ordered  PT/OT, OOB w/ assistance  Advance regular diet  ID also following patient      RUPINDER Baker  Orthopaedic Surgery  Physician Assistant to Dr. Marline Britton

## 2017-03-27 NOTE — PROGRESS NOTES
3/27/2017 10:06 AM Case management consult for home health received. HH referral sent to [facility] via [facility]. CM will follow up.  CHRISS Alejandre

## 2017-03-27 NOTE — ROUTINE PROCESS
Orthopedic & Surgical Nursing Communication Tool       8:05 AM  3/27/2017    Bedside and Verbal shift change report given to Jackie Glynn RN (incoming nurse) by Roman Rodríguez RN (outgoing nurse) on Serg Dorsey a 33 y.o. female who was admitted on 3/21/2017  3:40 PM. Report included the following information SBAR, Kardex and MAR. Significant changes during shift: Patient's pain under better control, patient tolerated some activity with physical therapist, doing log roll while in bed, small breakthrough drainage on aquacell, output very good.       Issues for physician to address: none            Code Status: No Order     Infections: No current active infections     Allergies: Review of patient's allergies indicates no known allergies. Diet: DIET REGULAR         Admission Date 3/21/2017   Admission Diagnosis Post-operative infection  Status Post surgery infection     Consults IP CONSULT TO INFECTIOUS DISEASES            Consults   [x]PT   [x]OT   []Speech   []Case Management      [] Rehab      Cardiac Monitoring Order   []Yes   [x]No         DVT Prophylaxis   SCDs:  Sequential Compression Device: Bilateral              Jorge stockings:            [] Meds   []Contraindicated   []None        Activity Level Activity Level: Up with Assistance      Activity Assistance: Partial (two people)     Purposeful Rounding every 1-2 hour?    [x]Yes   Georges Score  Total Score: 3     Bed Alarm (If score 3 or >)   []Yes   [] Refused (See signed refusal form in chart)   Sony Score  Sony Score: 20       Sony Score (if score 14 or less)   []PMT consult   []Wound Care consult      []Specialty bed   [] Nutrition consult      LBM: Last Bowel Movement Date: 03/23/17        Influenza Vaccine Received Flu Vaccine for Current Season (usually Sept-March): Yes        DIET Active Orders   Diet    DIET REGULAR      LDA PICC Single Lumen Left (Active)   Central Line Being Utilized Yes 3/27/2017  3:00 AM   Criteria for Appropriate Use Long term IV/antibiotic administration 3/27/2017  3:00 AM   Site Assessment Clean, dry, & intact 3/27/2017  3:00 AM   Phlebitis Assessment 0 3/27/2017  3:00 AM   Infiltration Assessment 0 3/27/2017  3:00 AM   Date of Last Dressing Change 03/23/17 3/25/2017  8:22 PM   Dressing Status Clean, dry, & intact 3/27/2017  3:00 AM   Dressing Type Tape;Transparent 3/27/2017  3:00 AM   Hub Color/Line Status Purple; Infusing 3/27/2017  3:00 AM   Action Taken Open ports on tubing capped 3/27/2017  3:00 AM   Positive Blood Return (Site #1) Yes 3/27/2017  3:00 AM   Alcohol Cap Used Yes 3/26/2017  2:45 PM      Urinary Catheter Urinary Catheter 03/24/17 2- way-Criteria for Appropriate Use: Surgical procedure   Intake & Output   Date 03/26/17 0700 - 03/27/17 0659 03/27/17 0700 - 03/28/17 0659   Shift 7734-4772 0390-9331 24 Hour Total 3862-2245 3647-7656 24 Hour Total   I  N  T  A  K  E   P.O.  240 240         P. O.  240 240       I.V.  (mL/kg/hr) 600  (1) 1458.3  (2.4) 2058.3  (1.7)         Volume (0.9% sodium chloride infusion) 600 808. 3 1408.3         Volume (ciprofloxacin (CIPRO) 400 mg IVPB (premix))  200 200         Volume (meropenem (MERREM) 500 mg in 0.9% sodium chloride (MBP/ADV) 50 mL)  200 200         Volume (vancomycin (VANCOCIN) 1,000 mg in 0.9% sodium chloride (MBP/ADV) 250 mL)  250 250       Shift Total  (mL/kg) 600  (12) 1698.3  (34) 2298.3  (46.1)      O  U  T  P  U  T   Urine  (mL/kg/hr) 4300  (7.2) 2700  (4.5) 7000  (5.8)         Urine Output (mL) (Urinary Catheter 03/24/17 2- way) 4300 2700 7000       Drains 0 0 0         Output (ml) (Hemovac Lower Back) 0 0 0       Shift Total  (mL/kg) 4300  (86.2) 2700  (54.1) 7000  (140.3)      NET -3700 -1001.7 -4701.7      Weight (kg) 49.9 49.9 49.9 49.9 49.9 49.9        Expected Length of Stay 3d 16h   Actual Length of Stay 6       Opportunity for questions and clarifications were given to the incoming nurse.  Patient's bed is in low position, side rails x2, door open PRN, call bell within reach and patient not in distress.       Jaret Yousif RN

## 2017-03-27 NOTE — PROGRESS NOTES
Nutrition Assessment:    RECOMMENDATIONS/INTERVENTION(S):   Continue current diet  Start Prosource protein powder TID to aid in meeting increased protein needs  Monitor po intake, supplement tolerance, wt, skin integrity    ASSESSMENT:   3/27:  Pt seen for LOS. 33 yof admitted for post-operative infection. S/p recent L5-S1 microdiskectomy. Returns w/ new fluid collection/abscess - aspiration 3/22 & I&D 3/24. New abx through 5/4. Visited pt this afternoon.  at bedside. -115# - @ 100% of UBW. Fair to good appetite. Last BM 3/23. Provided diet recs for constipation. Agreeable to Prosource protein powder trial for increased protein needs. Labs/meds reviewed. Skin: post-op wound to lower back. SUBJECTIVE/OBJECTIVE:     Diet Order: Regular  % Eaten:  Patient Vitals for the past 72 hrs:   % Diet Eaten   03/25/17 0941 50 %     Pertinent Medications: [x] Reviewed    Chemistries:  Lab Results   Component Value Date/Time    Sodium 138 03/27/2017 02:25 AM    Potassium 4.0 03/27/2017 02:25 AM    Chloride 103 03/27/2017 02:25 AM    CO2 30 03/27/2017 02:25 AM    Anion gap 5 03/27/2017 02:25 AM    Glucose 89 03/27/2017 02:25 AM    BUN 6 03/27/2017 02:25 AM    Creatinine 0.64 03/27/2017 02:25 AM    BUN/Creatinine ratio 9 03/27/2017 02:25 AM    GFR est AA >60 03/27/2017 02:25 AM    GFR est non-AA >60 03/27/2017 02:25 AM    Calcium 8.3 03/27/2017 02:25 AM    AST (SGOT) 21 03/21/2017 04:26 PM    Alk. phosphatase 112 03/21/2017 04:26 PM    Protein, total 8.0 03/21/2017 04:26 PM    Albumin 3.0 03/21/2017 04:26 PM    Globulin 5.0 03/21/2017 04:26 PM    A-G Ratio 0.6 03/21/2017 04:26 PM    ALT (SGPT) 40 03/21/2017 04:26 PM      Anthropometrics: Height: 5' 3\" (160 cm) Weight: 49.9 kg (110 lb 0.2 oz)    IBW (%IBW): 52.2 kg (115 lb) (95.66 %) UBW (%UBW): 49.9 kg (110 lb) (100.01 %)    BMI: Body mass index is 19.49 kg/(m^2).     This BMI is indicative of:   [] Underweight    [x] Normal    [] Overweight    [] Obesity    []  Extreme Obesity (BMI>40)  Estimated Nutrition Needs (Based on): 1537 Kcals/day (RMR (1183) x 1.3 AF) , 64 g (1.3 g/kg x actual body wt) Protein  Carbohydrate: At Least 130 g/day  Fluids: 1500 mL/day    Last BM: 3/23   [x]Active     []Hyperactive  []Hypoactive       [] Absent   BS  Skin:    [] Intact   [x] Incision  [] Breakdown   [] DTI   [] Tears/Excoriation/Abrasion  []Edema [] Other:    Wt Readings from Last 30 Encounters:   03/27/17 49.9 kg (110 lb 0.2 oz)   03/11/17 50.8 kg (112 lb)   03/05/17 49.4 kg (109 lb)   03/05/17 49.4 kg (109 lb)   03/05/17 49.4 kg (109 lb)   02/06/17 49.5 kg (109 lb 2 oz)      NUTRITION DIAGNOSES:   Problem:  Increased protein needs     Etiology: related to impaired skin integrity     Signs/Symptoms: as evidenced by post-op infected wound      NUTRITION INTERVENTIONS:  Meals/Snacks: General/healthful diet   Supplements: Commercial supplement     Initial/Brief Nutrition Education: Purpose of nutrition education        GOAL:   Pt will consume >50% of meals and >75% of supplements in the next 2-4 days    Cultural, Evangelical, or Ethnic Dietary Needs: None     LEARNING NEEDS (Diet, Food/Nutrient-Drug Interaction):    [] None Identified   [x] Identified and Education Provided/Documented   [] Identified and Pt declined/was not appropriate      [] Interdisciplinary Care Plan Reviewed/Documented    [] Discharge Needs:  TBD   [] No Nutrition Related Discharge Needs    NUTRITION RISK:   Pt Is At Nutrition Risk  [x]     No Nutrition Risk Identified  []       PT SEEN FOR:    []  MD Consult: []Calorie Count      []Diabetic Diet Education        []Diet Education     []Electrolyte Management     []General Nutrition Management and Supplements     []Management of Tube Feeding     []TPN Recommendations    []  RN Referral:  []MST score >=2     []Enteral/Parenteral Nutrition PTA     []Pregnant: Gestational DM or Multigestation                 [] Pressure Ulcer    []  Low BMI      [x]  Length of Stay       [] Dysphagia Diet         [] Ventilator            Vandana Martinez, [address] [place] [address]   Pager [phone]

## 2017-03-27 NOTE — PROGRESS NOTES
Called floor and spoke to Verito Infante [facility] to acknowledge PICC order. VAT placed PICC line last week and line is working fine per [facility]. VAT will discontinue to the PICC order.

## 2017-03-28 LAB
ANION GAP BLD CALC-SCNC: 6 MMOL/L (ref 5–15)
BUN SERPL-MCNC: 7 MG/DL (ref 6–20)
BUN/CREAT SERPL: 10 (ref 12–20)
CALCIUM SERPL-MCNC: 8.7 MG/DL (ref 8.5–10.1)
CHLORIDE SERPL-SCNC: 101 MMOL/L (ref 97–108)
CO2 SERPL-SCNC: 31 MMOL/L (ref 21–32)
CREAT SERPL-MCNC: 0.68 MG/DL (ref 0.55–1.02)
ERYTHROCYTE [DISTWIDTH] IN BLOOD BY AUTOMATED COUNT: 12 % (ref 11.5–14.5)
GLUCOSE SERPL-MCNC: 86 MG/DL (ref 65–100)
HCT VFR BLD AUTO: 29.4 % (ref 35–47)
HGB BLD-MCNC: 9.8 G/DL (ref 11.5–16)
MCH RBC QN AUTO: 30.6 PG (ref 26–34)
MCHC RBC AUTO-ENTMCNC: 33.3 G/DL (ref 30–36.5)
MCV RBC AUTO: 91.9 FL (ref 80–99)
PLATELET # BLD AUTO: 361 K/UL (ref 150–400)
POTASSIUM SERPL-SCNC: 4.2 MMOL/L (ref 3.5–5.1)
RBC # BLD AUTO: 3.2 M/UL (ref 3.8–5.2)
SODIUM SERPL-SCNC: 138 MMOL/L (ref 136–145)
WBC # BLD AUTO: 5 K/UL (ref 3.6–11)

## 2017-03-28 PROCEDURE — 74011250637 HC RX REV CODE- 250/637: Performed by: INTERNAL MEDICINE

## 2017-03-28 PROCEDURE — 65270000029 HC RM PRIVATE

## 2017-03-28 PROCEDURE — 74011250637 HC RX REV CODE- 250/637: Performed by: NURSE PRACTITIONER

## 2017-03-28 PROCEDURE — 85027 COMPLETE CBC AUTOMATED: CPT | Performed by: PHYSICIAN ASSISTANT

## 2017-03-28 PROCEDURE — 36415 COLL VENOUS BLD VENIPUNCTURE: CPT | Performed by: PHYSICIAN ASSISTANT

## 2017-03-28 PROCEDURE — 74011000258 HC RX REV CODE- 258: Performed by: INTERNAL MEDICINE

## 2017-03-28 PROCEDURE — 97165 OT EVAL LOW COMPLEX 30 MIN: CPT | Performed by: OCCUPATIONAL THERAPIST

## 2017-03-28 PROCEDURE — 74011250636 HC RX REV CODE- 250/636: Performed by: NURSE PRACTITIONER

## 2017-03-28 PROCEDURE — 74011250636 HC RX REV CODE- 250/636: Performed by: PHYSICIAN ASSISTANT

## 2017-03-28 PROCEDURE — 74011250637 HC RX REV CODE- 250/637: Performed by: PHYSICIAN ASSISTANT

## 2017-03-28 PROCEDURE — 74011250636 HC RX REV CODE- 250/636: Performed by: INTERNAL MEDICINE

## 2017-03-28 PROCEDURE — 80048 BASIC METABOLIC PNL TOTAL CA: CPT | Performed by: PHYSICIAN ASSISTANT

## 2017-03-28 PROCEDURE — 97530 THERAPEUTIC ACTIVITIES: CPT | Performed by: OCCUPATIONAL THERAPIST

## 2017-03-28 RX ORDER — CALCIUM CARBONATE 200(500)MG
400 TABLET,CHEWABLE ORAL
Status: DISCONTINUED | OUTPATIENT
Start: 2017-03-28 | End: 2017-04-04 | Stop reason: HOSPADM

## 2017-03-28 RX ORDER — METAXALONE 800 MG/1
800 TABLET ORAL 4 TIMES DAILY
Status: DISCONTINUED | OUTPATIENT
Start: 2017-03-28 | End: 2017-03-29

## 2017-03-28 RX ORDER — HYDROMORPHONE HYDROCHLORIDE 1 MG/ML
1 INJECTION, SOLUTION INTRAMUSCULAR; INTRAVENOUS; SUBCUTANEOUS
Status: DISCONTINUED | OUTPATIENT
Start: 2017-03-28 | End: 2017-03-30 | Stop reason: SDUPTHER

## 2017-03-28 RX ADMIN — CIPROFLOXACIN HYDROCHLORIDE 500 MG: 500 TABLET, FILM COATED ORAL at 09:45

## 2017-03-28 RX ADMIN — METAXALONE 800 MG: 800 TABLET ORAL at 23:00

## 2017-03-28 RX ADMIN — METAXALONE 800 MG: 800 TABLET ORAL at 17:17

## 2017-03-28 RX ADMIN — CIPROFLOXACIN HYDROCHLORIDE 500 MG: 500 TABLET, FILM COATED ORAL at 21:00

## 2017-03-28 RX ADMIN — Medication: at 00:11

## 2017-03-28 RX ADMIN — Medication: at 22:43

## 2017-03-28 RX ADMIN — CEFEPIME 2 G: 2 INJECTION, POWDER, FOR SOLUTION INTRAVENOUS at 15:04

## 2017-03-28 RX ADMIN — OXYCODONE HYDROCHLORIDE AND ACETAMINOPHEN 1 TABLET: 5; 325 TABLET ORAL at 00:17

## 2017-03-28 RX ADMIN — HYDROMORPHONE HYDROCHLORIDE 1 MG: 1 INJECTION, SOLUTION INTRAMUSCULAR; INTRAVENOUS; SUBCUTANEOUS at 15:05

## 2017-03-28 RX ADMIN — DIAZEPAM 5 MG: 5 INJECTION, SOLUTION INTRAMUSCULAR; INTRAVENOUS at 13:49

## 2017-03-28 RX ADMIN — HYDROMORPHONE HYDROCHLORIDE 1 MG: 1 INJECTION, SOLUTION INTRAMUSCULAR; INTRAVENOUS; SUBCUTANEOUS at 02:27

## 2017-03-28 RX ADMIN — CEFEPIME 2 G: 2 INJECTION, POWDER, FOR SOLUTION INTRAVENOUS at 06:02

## 2017-03-28 RX ADMIN — Medication 10 ML: at 13:56

## 2017-03-28 RX ADMIN — OXYCODONE HYDROCHLORIDE AND ACETAMINOPHEN 1 TABLET: 10; 325 TABLET ORAL at 05:55

## 2017-03-28 RX ADMIN — SODIUM CHLORIDE 100 ML/HR: 900 INJECTION, SOLUTION INTRAVENOUS at 13:56

## 2017-03-28 RX ADMIN — DIAZEPAM 5 MG: 5 INJECTION, SOLUTION INTRAMUSCULAR; INTRAVENOUS at 05:55

## 2017-03-28 RX ADMIN — OXYCODONE HYDROCHLORIDE AND ACETAMINOPHEN 1 TABLET: 10; 325 TABLET ORAL at 16:10

## 2017-03-28 RX ADMIN — METAXALONE 800 MG: 800 TABLET ORAL at 09:45

## 2017-03-28 RX ADMIN — CEFEPIME 2 G: 2 INJECTION, POWDER, FOR SOLUTION INTRAVENOUS at 22:36

## 2017-03-28 RX ADMIN — OXYCODONE HYDROCHLORIDE AND ACETAMINOPHEN 1 TABLET: 10; 325 TABLET ORAL at 10:23

## 2017-03-28 RX ADMIN — HYDROMORPHONE HYDROCHLORIDE 1 MG: 1 INJECTION, SOLUTION INTRAMUSCULAR; INTRAVENOUS; SUBCUTANEOUS at 18:05

## 2017-03-28 RX ADMIN — HYDROMORPHONE HYDROCHLORIDE 1 MG: 1 INJECTION, SOLUTION INTRAMUSCULAR; INTRAVENOUS; SUBCUTANEOUS at 20:55

## 2017-03-28 RX ADMIN — CALCIUM CARBONATE (ANTACID) CHEW TAB 500 MG 400 MG: 500 CHEW TAB at 17:17

## 2017-03-28 RX ADMIN — HYDROMORPHONE HYDROCHLORIDE 1 MG: 1 INJECTION, SOLUTION INTRAMUSCULAR; INTRAVENOUS; SUBCUTANEOUS at 12:06

## 2017-03-28 RX ADMIN — HYDROMORPHONE HYDROCHLORIDE 1 MG: 1 INJECTION, SOLUTION INTRAMUSCULAR; INTRAVENOUS; SUBCUTANEOUS at 07:42

## 2017-03-28 RX ADMIN — DIAZEPAM 5 MG: 5 INJECTION, SOLUTION INTRAMUSCULAR; INTRAVENOUS at 19:55

## 2017-03-28 RX ADMIN — MAGNESIUM HYDROXIDE 15 ML: 400 SUSPENSION ORAL at 15:09

## 2017-03-28 NOTE — PROGRESS NOTES
Bedside and Verbal shift change report given to Marilee Gamboa RN (oncoming nurse) by Ba Cordoba RN (offgoing nurse). Report included the following information SBAR, Kardex, Procedure Summary, Intake/Output, MAR and Recent Results. Patient with increased pain today. Persistent spasms not relieved by available PRN and scheduled meds. Starr Haile in to assess patient - increased frequency of skelaxin from TID to QID. IV dilaudid also changed to Q3 hrs PRN from Q4 hrs PRN.

## 2017-03-28 NOTE — PROGRESS NOTES
PHYSICAL THERAPY    1332 Two attempts made to work with patient. Patient continues to experience increased muscle spasm with mobility, decreasing tolerance to OOB activity. Patient very upset, frustrated and tearful regarding inability to participate with Occupational and Physical therapy. Despite education to gradually increase angle of HOB patient prefers to lie flat. PT will continue to follow. Jo Ann Parra

## 2017-03-28 NOTE — PROGRESS NOTES
[facility] Infectious Diseases Progress Note  Abdoulaye Aguilar MD,             Sean Parker MD,          Unique Blake [address]    [address], [address]  [phone]  Fax    3/28/2017      Assessment & Plan: Cefepime, Cipro   Post-op wound infection - s/p recent L5-S1 microdiskectomy and subsequent  I&D 2017. Intraoperative cultures growing enterobacter aeruginosa. Patient discharged on 6 week course of Ertapenem. Now returns with new fluid collection/abscess. Underwent aspiration by IR 3/22 and then another I&D 3/24, so far cultures showing Enterobacter again. Discontinued Vancomycin, Meropenem. This time will try combination of Cefepime IV and PO Cipro through 17, for additional 6 weeks from last Sx. · Continue cefepime, Ciprio           Subjective:   C/o significant pain since PT today.     Objective:     Vitals:   Visit Vitals    /82 (BP 1 Location: Right arm, BP Patient Position: At rest)    Pulse 99    Temp 98.8 °F (37.1 °C)    Resp 14    Ht 5' 3\" (1.6 m)    Wt 49.9 kg (110 lb 0.2 oz)    LMP 2017    SpO2 99%    BMI 19.49 kg/m2        Tmax:  Temp (24hrs), Av.3 °F (36.8 °C), Min:97.6 °F (36.4 °C), Max:99 °F (37.2 °C)      Exam:   GENERAL ASSESSMENT: NAD  HEENT: Nontraumatic NCAT  CHEST: no distress  HEART: rrr   SKIN:  No rash:   : Prabhakar: no  EXTREMITY: no edema  NEURO:Grossly non focal    Labs:        Recent Labs      17   0610  17   0225  17   0327   NA  138  138  138   K  4.2  4.0  3.8   CL  101  103  103   CO2  31  30  29   BUN  7  6  5*   CREA  0.68  0.64  0.65   GLU  86  89  103*   WBC  5.0  4.5  5.5   HGB  9.8*  9.7*  9.8*   HCT  29.4*  29.4*  28.3*   PLT  361  341  349         Cultures:     Lab Results   Component Value Date/Time    Culture result: FEW  ENTEROBACTER AEROGENES   2017 04:47 PM    Culture result: NO ANAEROBES ISOLATED 2017 04:47 PM    Culture result: NO GROWTH 4 DAYS 03/22/2017 01:05 PM    Culture result: NO GROWTH 4 DAYS 03/22/2017 01:05 PM    Culture result: NO GROWTH 5 DAYS 03/22/2017 01:05 PM       Radiology: reviewed    Medications       Current Facility-Administered Medications   Medication Dose Route Frequency Last Dose    calcium carbonate (TUMS) chewable tablet 400 mg [elemental]  400 mg Oral TID WITH MEALS      cefepime (MAXIPIME) 2 g in 0.9% sodium chloride (MBP/ADV) 100 mL  2 g IntraVENous Q8H 2 g at 03/28/17 0602    magnesium hydroxide (MILK OF MAGNESIA) 400 mg/5 mL oral suspension 15 mL  15 mL Oral DAILY PRN 15 mL at 03/27/17 1509    metaxalone (SKELAXIN) tablet 800 mg  800 mg Oral  mg at 03/28/17 0945    ciprofloxacin HCl (CIPRO) tablet 500 mg  500 mg Oral Q12H 500 mg at 03/28/17 0945    alteplase (CATHFLO) injection 1 mg  1 mg InterCATHeter PRN 1 mg at 03/26/17 1817    HYDROmorphone (PF) 15 mg/30 ml (DILAUDID) PCA   IntraVENous TITRATE      diazePAM (VALIUM) injection 5 mg  5 mg IntraVENous Q6H PRN 5 mg at 03/28/17 1349    HYDROmorphone (PF) (DILAUDID) injection 1 mg  1 mg IntraVENous Q4H PRN 1 mg at 03/28/17 1206    norethindrone-ethinyl estradiol (JUNEL FE 1/20) 1 mg-20 mcg (21)/75 mg (7) per tablet 1 Tab  1 Tab Oral DAILY 1 Tab at 03/26/17 0826    promethazine (PHENERGAN) tablet 25 mg  25 mg Oral Q6H PRN 25 mg at 03/22/17 1945    0.9% sodium chloride infusion  100 mL/hr IntraVENous CONTINUOUS 100 mL/hr at 03/28/17 1356    oxyCODONE-acetaminophen (PERCOCET) 5-325 mg per tablet 1 Tab  1 Tab Oral Q4H PRN 1 Tab at 03/28/17 0017    oxyCODONE-acetaminophen (PERCOCET 10)  mg per tablet 1 Tab  1 Tab Oral Q4H PRN 1 Tab at 03/28/17 1023    sodium chloride (NS) flush 5-10 mL  5-10 mL IntraVENous Q8H 10 mL at 03/28/17 1356    sodium chloride (NS) flush 5-10 mL  5-10 mL IntraVENous PRN      acetaminophen (TYLENOL) tablet 650 mg  650 mg Oral Q4H PRN      naloxone (NARCAN) injection 0.4 mg  0.4 mg IntraVENous PRN      diphenhydrAMINE (BENADRYL) injection 12.5 mg  12.5 mg IntraVENous Q4H PRN           Karen Madison, NP

## 2017-03-28 NOTE — PROGRESS NOTES
3/28/2017 1:31 PM IV abx orders sent to [facility] via [facility]. CM will follow for final discharge needs.  CHRISS Deng

## 2017-03-28 NOTE — PROGRESS NOTES
Problem: Self Care Deficits Care Plan (Adult)  Goal: *Acute Goals and Plan of Care (Insert Text)  Occupational Therapy Goals  Initiated 3/28/2017    1. Patient will perform lower body dressing with supervision/set-up using adaptive equipment PRN within 7 days. 2. Patient will perform toileting and toilet transfer with supervision/set-up using most appropriate DME within 7 days. 3. Patient will perform grooming standing at sink for 5 minutes or more at supervision/set-up within 7 days. 4. Patient will don/doff back brace at modified independence within 7 days. 5. Patient will verbalize/demonstrate 3/3 back precautions during ADL tasks without cues within 7 days. OCCUPATIONAL THERAPY EVALUATION  Patient: Jose Interiano (4 y.o. female)  Date: 3/28/2017  Primary Diagnosis: Post-operative infection  Status Post surgery infection  Procedure(s) (LRB):  SPINE INCISION AND DEBRIDEMENT LUMBAR (N/A) 4 Days Post-Op   Precautions:  Fall, Back (LSO brace when OOB; neutral spine)      ASSESSMENT :  Based on the objective data described below, the patient presents with c/o 8/10 back pain, back and abdominal spasms with all mobility, increased anxiety in anticipation of pain, decreased strength, endurance, mobility, balance and safety. Pt has been hospitalized twice since her initial back sx and now s/p aspiration 3/22/2017 and I and D 3/24/2017. She verbalized all back precautions from memory. Pt currently is eating her meals supine, lying flat in bed despite education and encouragement to sit upright (or at least more upright) to improve safe swallowing and upright tolerance. She requires max-total A for all other ADLs and functional mobility. She was able to come to sitting EOB with total A and then immediatly became rigid in all extremities and spine with pt crying in pain stating she was experiencing \"a spasm that had locked up her whole body. \"  Assisted pt back to supine and positioned her to increase comfort and notified her RN. Recommend IP rehab vs [place] therapy depending on progress. Patient will benefit from skilled intervention to address the above impairments. Patients rehabilitation potential is considered to be Good  Factors which may influence rehabilitation potential include:   [ ]             None noted  [ ]             Mental ability/status  [X]             Medical condition  [ ]             Home/family situation and support systems  [ ]             Safety awareness  [X]             Pain tolerance/management  [ ]             Other:        PLAN :  Recommendations and Planned Interventions:  [X]               Self Care Training                  [X]        Therapeutic Activities  [X]               Functional Mobility Training    [ ]        Cognitive Retraining  [X]               Therapeutic Exercises           [X]        Endurance Activities  [X]               Balance Training                   [X]        Neuromuscular Re-Education  [ ]               Visual/Perceptual Training     [X]   Home Safety Training  [X]               Patient Education                 [X]        Family Training/Education  [ ]               Other (comment):     Frequency/Duration: Patient will be followed by occupational therapy 5 times a week to address goals. Discharge Recommendations: Home Health vs Inpatient Rehab  Further Equipment Recommendations for Discharge: TBD       SUBJECTIVE:   Patient stated I am so weak. It takes so much energy to work through this pain and spasms      OBJECTIVE DATA SUMMARY:   HISTORY:   Past Medical History:   Diagnosis Date    Ill-defined condition       elevated cholesterol     Past Surgical History:   Procedure Laterality Date    HX ORTHOPAEDIC   02/2017     back surgery w/ Dr Marlin Matos     [id number] [facility] eye surgery        Prior Level of Function/Home Situation: Prior to 2nd back sx for revision she was independent, working, active and driving.   Home Situation  Home Environment: Private residence  # Steps to Enter: 4  Rails to Enter: Yes  Hand Rails : Bilateral  Wheelchair Ramp: Yes  One/Two Story Residence: One story  Living Alone: No  Support Systems: Family member(s), Spouse/Significant Other/Partner  Patient Expects to be Discharged to[de-identified] Private residence  Current DME Used/Available at Home: Commode, bedside, Adaptive dressing aides, Brace/Splint (reacher)  Tub or Shower Type: Shower  [X]  Right hand dominant             [ ]  Left hand dominant     EXAMINATION OF PERFORMANCE DEFICITS:  Cognitive/Behavioral Status:  Neurologic State: Alert; Appropriate for age  Orientation Level: Oriented X4  Cognition: Appropriate decision making; Appropriate for age attention/concentration; Appropriate safety awareness; Follows commands  Perception: Appears intact  Perseveration: No perseveration noted  Safety/Judgement: Awareness of environment; Fall prevention; Insight into deficits     Hearing: Auditory  Auditory Impairment: None     Vision/Perceptual:    Acuity: Within Defined Limits          Range of Motion:  AROM: Generally decreased, functional (due to pain, anxiety and spasms)                          Strength:  Strength: Generally decreased, functional (due to pain, anxiety and spasms)                 Coordination:  Coordination: Generally decreased, functional  Fine Motor Skills-Upper: Left Intact; Right Intact    Gross Motor Skills-Upper: Left Intact; Right Intact     Balance:  Sitting: Impaired; With support  Sitting - Static: Poor (constant support)  Sitting - Dynamic: Poor (constant support)  Standing:  (unable to come to stand due to pain, anxiety and spasms)     Functional Mobility and Transfers for ADLs:  Bed Mobility:  Rolling: Maximum assistance; Additional time;Assist x1  Supine to Sit: Total assistance; Additional time;Assist x1  Sit to Supine: Total assistance; Additional time;Assist x1  Scooting: Total assistance; Additional time;Assist x1     Transfers:  Sit to Stand:  (unable to come to stand due to pain, anxiety and spasms)  Toilet Transfer : Total assistance (unable to come to stand due to pain, anxiety and spasms)     ADL Assessment:  Feeding: Setup; Additional time (lying supine in bed- pt declined HOB elevated)     Oral Facial Hygiene/Grooming: Setup (lying supine in bed- pt declined HOB elevated)     Bathing: Moderate assistance; Additional time;Assist x1 (bed level- A for buttocks and LEs)     Upper Body Dressing: Total assistance (pt not tolerating mobility and activity due to pain, spasms)     Lower Body Dressing: Total assistance (pt not tolerating mobility and activity due to pain, spasms)     Toileting: Total assistance (pt with orourke)     Cognitive Retraining  Safety/Judgement: Awareness of environment; Fall prevention; Insight into deficits     Functional Measure:  Barthel Index:      Bathin  Bladder: 0 (orourke)  Bowels: 5  Groomin  Dressin  Feedin  Mobility: 0  Stairs: 0  Toilet Use: 0  Transfer (Bed to Chair and Back): 0  Total: 10         Barthel and G-code impairment scale:  Percentage of impairment CH  0% CI  1-19% CJ  20-39% CK  40-59% CL  60-79% CM  80-99% CN  100%   Barthel Score 0-100 100 99-80 79-60 59-40 20-39 1-19    0   Barthel Score 0-20 20 17-19 13-16 9-12 5-8 1-4 0      The Barthel ADL Index: Guidelines  1. The index should be used as a record of what a patient does, not as a record of what a patient could do. 2. The main aim is to establish degree of independence from any help, physical or verbal, however minor and for whatever reason. 3. The need for supervision renders the patient not independent. 4. A patient's performance should be established using the best available evidence. Asking the patient, friends/relatives and nurses are the usual sources, but direct observation and common sense are also important. However direct testing is not needed.   5. Usually the patient's performance over the preceding 24-48 hours is important, but occasionally longer periods will be relevant. 6. Middle categories imply that the patient supplies over 50 per cent of the effort. 7. Use of aids to be independent is allowed. Terrell Agrawal., Barthel, DRAKEW. (7295). Functional evaluation: the Barthel Index. [address] [facility] (14)2. MAT Rodriguez, Liz Rolon., Ana Sebastien., [place], [address] (1999). Measuring the change indisability after inpatient rehabilitation; comparison of the responsiveness of the Barthel Index and Functional [place] Measure. Journal of Neurology, Neurosurgery, and Psychiatry, 66(4), 455-247. Juan Villasenor, NKRISTIN.A, ELIZABETH Figueroa, & Saad Lee M.A. (2004.) Assessment of post-stroke quality of life in cost-effectiveness studies: The usefulness of the Barthel Index and the EuroQoL-5D. Quality of Life Research, 15, 770-20         Occupational Therapy Evaluation Charge Determination   History Examination Decision-Making   LOW Complexity : Brief history review  MEDIUM Complexity : 3-5 performance deficits relating to physical, cognitive , or psychosocial skils that result in activity limitations and / or participation restrictions MEDIUM Complexity : Patient may present with comorbidities that affect occupational performnce. Miniml to moderate modification of tasks or assistance (eg, physical or verbal ) with assesment(s) is necessary to enable patient to complete evaluation       Based on the above components, the patient evaluation is determined to be of the following complexity level: LOW   Pain:  Pain Scale 1: Numeric (0 - 10)  Pain Intensity 1: 9  Pain Location 1: Back  Pain Orientation 1: Lower  Pain Description 1: Intermittent; Throbbing (spasms)  Pain Intervention(s) 1: Medication (see MAR)  Activity Tolerance:   Poor  Please refer to the flowsheet for vital signs taken during this treatment.   After treatment:   [ ] Patient left in no apparent distress sitting up in chair  [X] Patient left in no apparent distress in bed  [X] Call bell left within reach  [X] Nursing notified  [ ] Caregiver present  [ ] Bed alarm activated      COMMUNICATION/EDUCATION:   The patients plan of care was discussed with: Physical Therapy Assistant and Registered Nurse.  [X] Home safety education was provided and the patient/caregiver indicated understanding. [X] Patient/family have participated as able in goal setting and plan of care. [X] Patient/family agree to work toward stated goals and plan of care. [ ] Patient understands intent and goals of therapy, but is neutral about his/her participation. [ ] Patient is unable to participate in goal setting and plan of care. This patients plan of care is appropriate for delegation to [facility].      Thank you for this referral.  Dilip Melendez OT  Time Calculation: 26 mins

## 2017-03-28 NOTE — PROGRESS NOTES
Occupational Therapy Note:  Orders received and appreciated. Chart reviewed. Pt cleared for therapy by RN. Have attempted OT eval x2 this am.  First time pt was eating breakfast and she requested to come back after. Second time pt stated she was too sleep from pain meds as she was having severe back and abdominal pain and spasms earlier with PT. Will try back later this afternoon as able. Thank you for the consult.   Luciana Adorno, OTR/L, CBIS

## 2017-03-29 LAB
ANION GAP BLD CALC-SCNC: 4 MMOL/L (ref 5–15)
BUN SERPL-MCNC: 9 MG/DL (ref 6–20)
BUN/CREAT SERPL: 14 (ref 12–20)
CALCIUM SERPL-MCNC: 9.1 MG/DL (ref 8.5–10.1)
CHLORIDE SERPL-SCNC: 101 MMOL/L (ref 97–108)
CK SERPL-CCNC: 25 U/L (ref 26–192)
CO2 SERPL-SCNC: 34 MMOL/L (ref 21–32)
CREAT SERPL-MCNC: 0.63 MG/DL (ref 0.55–1.02)
ERYTHROCYTE [DISTWIDTH] IN BLOOD BY AUTOMATED COUNT: 12 % (ref 11.5–14.5)
GLUCOSE SERPL-MCNC: 84 MG/DL (ref 65–100)
HCT VFR BLD AUTO: 31.2 % (ref 35–47)
HGB BLD-MCNC: 10.1 G/DL (ref 11.5–16)
MCH RBC QN AUTO: 29.9 PG (ref 26–34)
MCHC RBC AUTO-ENTMCNC: 32.4 G/DL (ref 30–36.5)
MCV RBC AUTO: 92.3 FL (ref 80–99)
PLATELET # BLD AUTO: 385 K/UL (ref 150–400)
POTASSIUM SERPL-SCNC: 3.8 MMOL/L (ref 3.5–5.1)
RBC # BLD AUTO: 3.38 M/UL (ref 3.8–5.2)
SODIUM SERPL-SCNC: 139 MMOL/L (ref 136–145)
WBC # BLD AUTO: 5.2 K/UL (ref 3.6–11)

## 2017-03-29 PROCEDURE — 85027 COMPLETE CBC AUTOMATED: CPT | Performed by: PHYSICIAN ASSISTANT

## 2017-03-29 PROCEDURE — 74011250636 HC RX REV CODE- 250/636: Performed by: NURSE PRACTITIONER

## 2017-03-29 PROCEDURE — 97530 THERAPEUTIC ACTIVITIES: CPT | Performed by: OCCUPATIONAL THERAPIST

## 2017-03-29 PROCEDURE — 74011250637 HC RX REV CODE- 250/637: Performed by: NURSE PRACTITIONER

## 2017-03-29 PROCEDURE — 77030012935 HC DRSG AQUACEL BMS -B

## 2017-03-29 PROCEDURE — 74011250636 HC RX REV CODE- 250/636: Performed by: INTERNAL MEDICINE

## 2017-03-29 PROCEDURE — 65270000029 HC RM PRIVATE

## 2017-03-29 PROCEDURE — 97116 GAIT TRAINING THERAPY: CPT

## 2017-03-29 PROCEDURE — 97530 THERAPEUTIC ACTIVITIES: CPT

## 2017-03-29 PROCEDURE — 82550 ASSAY OF CK (CPK): CPT | Performed by: PHYSICIAN ASSISTANT

## 2017-03-29 PROCEDURE — 74011250637 HC RX REV CODE- 250/637: Performed by: INTERNAL MEDICINE

## 2017-03-29 PROCEDURE — 74011000258 HC RX REV CODE- 258: Performed by: INTERNAL MEDICINE

## 2017-03-29 PROCEDURE — 80048 BASIC METABOLIC PNL TOTAL CA: CPT | Performed by: PHYSICIAN ASSISTANT

## 2017-03-29 PROCEDURE — 97110 THERAPEUTIC EXERCISES: CPT | Performed by: OCCUPATIONAL THERAPIST

## 2017-03-29 PROCEDURE — 74011250636 HC RX REV CODE- 250/636: Performed by: PHYSICIAN ASSISTANT

## 2017-03-29 PROCEDURE — 74011250637 HC RX REV CODE- 250/637: Performed by: PHYSICIAN ASSISTANT

## 2017-03-29 PROCEDURE — 36415 COLL VENOUS BLD VENIPUNCTURE: CPT | Performed by: PHYSICIAN ASSISTANT

## 2017-03-29 RX ORDER — DICLOFENAC SODIUM 25 MG/1
50 TABLET, DELAYED RELEASE ORAL 2 TIMES DAILY
Status: DISCONTINUED | OUTPATIENT
Start: 2017-03-29 | End: 2017-03-31

## 2017-03-29 RX ORDER — BACLOFEN 10 MG/1
5 TABLET ORAL 3 TIMES DAILY
Status: DISCONTINUED | OUTPATIENT
Start: 2017-03-29 | End: 2017-03-31

## 2017-03-29 RX ORDER — AMOXICILLIN 250 MG
1 CAPSULE ORAL 2 TIMES DAILY
Status: DISCONTINUED | OUTPATIENT
Start: 2017-03-29 | End: 2017-04-04 | Stop reason: HOSPADM

## 2017-03-29 RX ADMIN — DOCUSATE SODIUM -SENNOSIDES 1 TABLET: 50; 8.6 TABLET, COATED ORAL at 18:07

## 2017-03-29 RX ADMIN — Medication 10 ML: at 10:04

## 2017-03-29 RX ADMIN — DICLOFENAC SODIUM 50 MG: 25 TABLET, DELAYED RELEASE ORAL at 18:06

## 2017-03-29 RX ADMIN — CIPROFLOXACIN HYDROCHLORIDE 500 MG: 500 TABLET, FILM COATED ORAL at 22:27

## 2017-03-29 RX ADMIN — Medication 10 ML: at 07:13

## 2017-03-29 RX ADMIN — CIPROFLOXACIN HYDROCHLORIDE 500 MG: 500 TABLET, FILM COATED ORAL at 08:48

## 2017-03-29 RX ADMIN — BACLOFEN 5 MG: 10 TABLET ORAL at 11:23

## 2017-03-29 RX ADMIN — CEFEPIME 2 G: 2 INJECTION, POWDER, FOR SOLUTION INTRAVENOUS at 15:27

## 2017-03-29 RX ADMIN — CALCIUM CARBONATE (ANTACID) CHEW TAB 500 MG 400 MG: 500 CHEW TAB at 08:48

## 2017-03-29 RX ADMIN — HYDROMORPHONE HYDROCHLORIDE 1 MG: 1 INJECTION, SOLUTION INTRAMUSCULAR; INTRAVENOUS; SUBCUTANEOUS at 01:10

## 2017-03-29 RX ADMIN — HYDROMORPHONE HYDROCHLORIDE 1 MG: 1 INJECTION, SOLUTION INTRAMUSCULAR; INTRAVENOUS; SUBCUTANEOUS at 04:41

## 2017-03-29 RX ADMIN — CEFEPIME 2 G: 2 INJECTION, POWDER, FOR SOLUTION INTRAVENOUS at 06:02

## 2017-03-29 RX ADMIN — ALTEPLASE 1 MG: 2.2 INJECTION, POWDER, LYOPHILIZED, FOR SOLUTION INTRAVENOUS at 10:06

## 2017-03-29 RX ADMIN — DOCUSATE SODIUM -SENNOSIDES 1 TABLET: 50; 8.6 TABLET, COATED ORAL at 11:23

## 2017-03-29 RX ADMIN — HYDROMORPHONE HYDROCHLORIDE 1 MG: 1 INJECTION, SOLUTION INTRAMUSCULAR; INTRAVENOUS; SUBCUTANEOUS at 13:33

## 2017-03-29 RX ADMIN — BACLOFEN 5 MG: 10 TABLET ORAL at 22:27

## 2017-03-29 RX ADMIN — OXYCODONE HYDROCHLORIDE AND ACETAMINOPHEN 1 TABLET: 10; 325 TABLET ORAL at 15:27

## 2017-03-29 RX ADMIN — HYDROMORPHONE HYDROCHLORIDE 1 MG: 1 INJECTION, SOLUTION INTRAMUSCULAR; INTRAVENOUS; SUBCUTANEOUS at 16:58

## 2017-03-29 RX ADMIN — DIAZEPAM 5 MG: 5 INJECTION, SOLUTION INTRAMUSCULAR; INTRAVENOUS at 05:55

## 2017-03-29 RX ADMIN — CALCIUM CARBONATE (ANTACID) CHEW TAB 500 MG 400 MG: 500 CHEW TAB at 11:23

## 2017-03-29 RX ADMIN — NORETHINDRONE ACETATE AND ETHINYL ESTRADIOL 1 TABLET: KIT at 09:00

## 2017-03-29 RX ADMIN — OXYCODONE HYDROCHLORIDE AND ACETAMINOPHEN 1 TABLET: 10; 325 TABLET ORAL at 22:28

## 2017-03-29 RX ADMIN — CALCIUM CARBONATE (ANTACID) CHEW TAB 500 MG 400 MG: 500 CHEW TAB at 18:08

## 2017-03-29 RX ADMIN — BACLOFEN 5 MG: 10 TABLET ORAL at 16:57

## 2017-03-29 RX ADMIN — DIAZEPAM 5 MG: 5 INJECTION, SOLUTION INTRAMUSCULAR; INTRAVENOUS at 18:45

## 2017-03-29 RX ADMIN — HYDROMORPHONE HYDROCHLORIDE 1 MG: 1 INJECTION, SOLUTION INTRAMUSCULAR; INTRAVENOUS; SUBCUTANEOUS at 10:04

## 2017-03-29 RX ADMIN — OXYCODONE HYDROCHLORIDE AND ACETAMINOPHEN 1 TABLET: 10; 325 TABLET ORAL at 07:12

## 2017-03-29 RX ADMIN — DICLOFENAC SODIUM 50 MG: 25 TABLET, DELAYED RELEASE ORAL at 11:22

## 2017-03-29 RX ADMIN — METAXALONE 800 MG: 800 TABLET ORAL at 08:48

## 2017-03-29 RX ADMIN — Medication 10 ML: at 22:28

## 2017-03-29 RX ADMIN — CEFEPIME 2 G: 2 INJECTION, POWDER, FOR SOLUTION INTRAVENOUS at 22:28

## 2017-03-29 NOTE — PROGRESS NOTES
Problem: Self Care Deficits Care Plan (Adult)  Goal: *Acute Goals and Plan of Care (Insert Text)  Occupational Therapy Goals  Initiated 3/28/2017    1. Patient will perform lower body dressing with supervision/set-up using adaptive equipment PRN within 7 days. 2. Patient will perform toileting and toilet transfer with supervision/set-up using most appropriate DME within 7 days. 3. Patient will perform grooming standing at sink for 5 minutes or more at supervision/set-up within 7 days. 4. Patient will don/doff back brace at modified [place] within 7 days. 5. Patient will verbalize/demonstrate 3/3 back precautions during ADL tasks without cues within 7 days. OCCUPATIONAL THERAPY TREATMENT  Patient: Harjeet Landin (75 y.o. female)  Date: 3/29/2017  Diagnosis: Post-operative infection  Status Post surgery infection <principal problem not specified>  Procedure(s) (LRB):  SPINE INCISION AND DEBRIDEMENT LUMBAR (N/A) 5 Days Post-Op  Precautions: Fall, Back (LSO brace when OOB; neutral spine) No bending, no lifting greater than 5 lbs, no twisting, log-roll technique, repositioning every 20-30 min except when sleeping, LSO brace when OOB      ASSESSMENT:  Pt remains with significant pain, anxiety with all thought of mobility and while participating in therapy and muscle spasms. Pt and her  stated pt had a bad night and both declined pt participating in mobility at this time, but requesting education on tasks and activities pt can perform in bed. Educated both pt and her  on isometric exer, increasing the elevation of the HOB gradually, ADL participation in bed and AROM to maintain flexibility in joints to perform ADLs especially lower body, relaxation techniques and leisure tasks. Both verbalized good understanding. Pt agreed to attempted increased activity and pt's  stated he is trying and will continue to encouraged increased activity.   Recommend HH therapy vs IP rehab st discharge depending on pt's progress. Progression toward goals:  [ ]          Improving appropriately and progressing toward goals  [X]          Improving slowly and progressing toward goals  [ ]          Not making progress toward goals and plan of care will be adjusted       PLAN:  Patient continues to benefit from skilled intervention to address the above impairments. Continue treatment per established plan of care. Discharge Recommendations:  Home Health vs Inpatient Rehab  Further Equipment Recommendations for Discharge:  TBD       SUBJECTIVE:   Patient stated I can't so anything right now.   The patient stated 3/3 back precautions. Reviewed all 3 with patient. OBJECTIVE DATA SUMMARY:   Cognitive/Behavioral Status:  Neurologic State: Drowsy  Orientation Level: Oriented X4  Cognition: Decreased attention/concentration, Follows commands  Safety/Judgement: Awareness of environment, Fall prevention, Good awareness of safety precautions, Insight into deficits, Home safety     ADL Intervention and Instruction:  Educated both pt and her  on isometric exer, increasing the elevation of the HOB gradually, ADL participation in bed and AROM to maintain flexibility in joints to perform ADLs especially lower body, relaxation techniques and leisure tasks. Both verbalized good understanding. Pt agreed to attempted increased activity and pt's  stated he is trying and will continue to encouraged increased activity. Cognitive Retraining  Safety/Judgement: Awareness of environment; Fall prevention;Good awareness of safety precautions; Insight into deficits;Home safety      Dressing lower body: Patient instructed to don brace first and on the benefits to remain seated to don all clothing to increase independence with precautions and pain management. Toileting: Patient instructed on the benefits of using flushable wet wipes and toilet tongs if decreased reach or pain for dustin care.  Also, the benefits of a reacher to aid in clothing management. Home safety: Patient instructed and indicated understanding on home modifications and safety (raise height of ADL objects, appropriate height of chair surfaces, recliner safety, change of floor surfaces, clear pathways) to increase independence and fall prevention. Patient instructed and indicated understanding the benefits of maintaining activity tolerance, functional mobility, and independence with self care tasks during acute stay  to ensure safe return home and to baseline. Encouraged patient to increase frequency and duration OOB, not sitting longer than 30 mins without marching/walking with staff, be out of bed for all meals, perform daily ADLs (as approved by RN/MD regarding bathing etc), and performing functional mobility to/from bathroom. Patient instruction and indicated understanding on body mechanics, ergonomics and gravitational force on the spine during different body positions to plan activities in prep for return home to complete instrumental ADLs and back to work safely. Pain:  Pain Scale 1: Numeric (0 - 10)  Pain Intensity 1: 7  Pain Location 1: Back  Pain Orientation 1: Lower  Pain Description 1: Aching  Pain Intervention(s) 1: Medication (see MAR)     Activity Tolerance:   Poor due to pain, anxiety and muscle spasms  Please refer to the flowsheet for vital signs taken during this treatment.   After treatment:   [ ] Patient left in no apparent distress sitting up in chair  [X] Patient left in no apparent distress in bed  [X] Call bell left within reach  [X] Nursing notified  [X] Caregiver present - pt's   [ ] Bed alarm activated      COMMUNICATION/COLLABORATION:   The patients plan of care was discussed with: Physical Therapy Assistant and Registered Nurse     Nicol Wheatley OT  Time Calculation: 17 mins

## 2017-03-29 NOTE — PROGRESS NOTES
ORTHOPAEDIC SPINE SURGERY NOTE    NAME:     Greg Robles   :       1985   MRN:       [id number]   DATE:      3/29/2017    POD:    5 Days Post-Op  S/P:    Procedure(s):  SPINE INCISION AND DEBRIDEMENT LUMBAR    SUBJECTIVE:    C/O back pain and spasms to low back, hips and thighs  No radiating leg pain or numbness  Denies nausea/vomiting, chest pain, headache or shortness of breath    Skelaxin and prn IV Valium not controlling spasms    Recent Labs      17   0610   HGB  9.8*   HCT  29.4*   NA  138   K  4.2   CL  101   CO2  31   BUN  7   CREA  0.68   GLU  86     Patient Vitals for the past 12 hrs:   BP Temp Pulse Resp SpO2   17 0904 108/66 98.6 °F (37 °C) 99 17 90 %   17 0442 117/72 - (!) 102 16 93 %   17 2336 110/68 - 87 16 98 %       EXAM:  Dressings intact   Positive strength/ROM bilat lower ext.   Neuro intact to sensation  Calves, soft & nontender  BL LEs NVID      PLAN:  Will stop Skelaxin and start Baclofen, Will continue prn IV Valium at this time  Will consult Palliative Care re: Pain control  PT/OT, OOB w/ assistance  Advance regular diet      Beronica Mcnamara [place]  Orthopaedic Surgery  Physician Assistant to Dr. Geoffrey Byrd

## 2017-03-29 NOTE — PROGRESS NOTES
ORTHOPAEDIC SPINE SURGERY PROGRESS NOTE    NAME:     April Falk   :       1985   MRN:       [id number]   DATE:      3/28/2017    POD:    4 Days Post-Op  S/P:    Procedure(s):  SPINE INCISION AND DEBRIDEMENT LUMBAR    SUBJECTIVE:    C/O intermittent back spasms, seem to be improving  No leg pain or numbness  Denies nausea/vomiting, chest pain, headache or shortness of breath    Recent Labs      17   0610   HGB  9.8*   HCT  29.4*   NA  138   K  4.2   CL  101   CO2  31   BUN  7   CREA  0.68   GLU  86     Patient Vitals for the past 12 hrs:   BP Temp Pulse Resp SpO2   17 2058 117/76 - 96 16 100 %   17 1601 - - - - 98 %   17 1553 113/79 98.8 °F (37.1 °C) 90 16 99 %   17 1358 - - 99 - 99 %   17 1153 116/82 98.8 °F (37.1 °C) 84 14 99 %       EXAM:  Dressings intact   Positive strength/ROM bilat lower ext.   Neuro intact to sensation  Calves, soft & nontender  BL LEs NVID      PLAN:  Continue pain medications and muscle relaxers as ordered  PT/OT, OOB w/ assistance  Advance regular diet  ID also following patient      Figueroa Mattson [place]  Orthopaedic Surgery  Physician Assistant to Dr. Luis Enrique Atwood

## 2017-03-29 NOTE — PROGRESS NOTES
Problem: Mobility Impaired (Adult and Pediatric)  Goal: *Acute Goals and Plan of Care (Insert Text)  Physical Therapy Goals  Initiated 3/26/2017    1. Patient will move from supine to sit and sit to supine , scoot up and down and roll side to side in bed with independence within 4 days. 2. Patient will perform sit to stand with independence within 4 days. 3. Patient will ambulate with independence for 200 feet with the least restrictive device within 4 days. 4. Patient will ascend/descend 4 stairs with handrail(s) with independence within 4 days. 5. Patient will verbalize and demonstrate understanding of spinal precautions (No bending, lifting greater than 5 lbs, or twisting; log-roll technique; frequent repositioning as instructed) within 4 days. PHYSICAL THERAPY TREATMENT  Patient: Maria Del Carmen Soto (83 y.o. female)  Date: 3/29/2017  Diagnosis: Post-operative infection  Status Post surgery infection <principal problem not specified>  Procedure(s) (LRB):  SPINE INCISION AND DEBRIDEMENT LUMBAR (N/A) 5 Days Post-Op  Precautions: Fall, Back (LSO brace when OOB; neutral spine)      ASSESSMENT:  Pt received in bed, agreeable to participate with physical therapy. Able to perform rolling bed mobility with Min A. Sidelying to sitting EOB with min A. Total assist to don LSO, no back spasms with bed mobility. Pt sat EOB approx 2 min. Requested to attempt standing. Using RW and height of bed elevated sit<>stand with mod A x2. Gait training using RW x 20' demonstrated increased WB through BUE with min A, second person to manage IV lines. Able to stand x 3min using RW for steadying while hospital bed exchanged. Pt returned to bed, min A x2 sit >supine. No reports of increased back spasm during or at conclusion of session.   Progression toward goals:  [ ]      Improving appropriately and progressing toward goals  [X]      Improving slowly and progressing toward goals  [ ]      Not making progress toward goals and plan of care will be adjusted       PLAN:  Patient continues to benefit from skilled intervention to address the above impairments. Continue treatment per established plan of care. Discharge Recommendations:  Rehab  Further Equipment Recommendations for Discharge:  none       SUBJECTIVE:   Patient stated i am feeling better, want to try.    The patient stated 3/3 back precautions. Reviewed all 3 with patient. OBJECTIVE DATA SUMMARY:   Critical Behavior:  Neurologic State: Drowsy  Orientation Level: Oriented X4  Cognition: Decreased attention/concentration, Follows commands  Safety/Judgement: Awareness of environment, Fall prevention, Good awareness of safety precautions, Insight into deficits, Home safety  Functional Mobility Training:  Bed Mobility:  Log    Supine to Sit: Minimum assistance  Sit to Supine: Minimum assistance           Brace donned with  total assistance   Transfers:  Sit to Stand: Moderate assistance;Assist x2  Stand to Sit: Moderate assistance;Assist x2                             Balance:  Sitting - Static: Fair (occasional)  Sitting - Dynamic: Fair (occasional)  Standing - Static: Constant support; Fair  Standing - Dynamic : Fair  Ambulation/Gait Training:  Distance (ft): 20 Feet (ft)  Assistive Device: Brace/Splint; Walker, rolling;Gait belt  Ambulation - Level of Assistance: Minimal assistance        Gait Abnormalities: Decreased step clearance        Base of Support: Narrowed     Speed/Dawn: Slow  Step Length: Left shortened;Right shortened                               Stairs: Therapeutic Exercises:      Pain:  Pain Scale 1: Numeric (0 - 10)  Pain Intensity 1: 4  Pain Location 1: Back  Pain Orientation 1: Lower  Pain Description 1: Aching  Pain Intervention(s) 1: Medication (see MAR)  Activity Tolerance:   Good  Please refer to the flowsheet for vital signs taken during this treatment.   After treatment:   [ ]  Patient left in no apparent distress sitting up in chair  [X]  Patient left in no apparent distress in bed  [X]  Call bell left within reach  [X]  Nursing notified  [ ]  Caregiver present  [ ]  Bed alarm activated      COMMUNICATION/COLLABORATION:   The patients plan of care was discussed with: Registered Nurse     Sarah Barraza   Time Calculation: 34 mins

## 2017-03-29 NOTE — PROGRESS NOTES
Bedside and Verbal shift change report given to [address] [place] [address] (oncoming nurse) by [facility] (offgoing nurse). Report included the following information  SBAR, Kardex, Procedure Summary, Intake/Output, MAR and Recent Results.

## 2017-03-29 NOTE — CONSULTS
Palliative Medicine Consult  Arturo: [phone] ([phone])    Patient Name: Chris Wilder  YOB: 1985    Date of Initial Consult: 3/29/2017  Reason for Consult: Pain management  Requesting Provider: RUPINDER Burns   Primary Care Physician: Junior Slater. Nathanael Vega MD      SUMMARY:   Chris Wilder is a 33 y.o. with a past history of HLD, back pain, patient had back surgery- L5-S1 microdiscectomy by Dr. Jerri Malone 2/6/17, recovered well until 3/5/17 when she developed significant back spasms and admitted for intractable pain, on 3/7/17 had a revision of discectomy and found to have a post op infection growing enterobacter, had PICC placed and 3/10/17 went home and received IV antibiotics. Then on 3/21/2017 she was admitted  from home  with a diagnosis of 7/10 throbbing back pain, 3/24 returned to OR for I&D still growing enterobactor. Day +5 s/p post op from I &D. Still having terrible back spasms and pain. Current medical issues leading to Palliative Medicine involvement include: pain management, opioid induced constipation, osteomyelitis of disc. PALLIATIVE DIAGNOSES:   1. Back pain  2. Back spasms  3. Debility- has been laying flat for over 6 days now  4. Opioid induced constipation- no BM for 6 days       PLAN:   1. Met with patient and patient's . Discussed set back patient has experienced in the last few months. She is currently on dilaudid PCA- 0.5 dose, no basal with 3 mg/ 4 hour limit. Since 7 AM with 5 demands and 4 deliveries. She has used 2 doses of percocet in last 24 hours. Was also receiving valium 5 mg IV. 2. Pain: Recommendations: Leave dilaudid PCA with same settings. Goals for her to use less and have better pain control. Pain is about 7/10. Some radiation of pain with spasms awful when she tries to move. Yesterday worked with PT and had set back of two hours with back spasms that went to abdomen, upper thighs and hips. Ordered diclofenac 50 mg BID to help with inflammation.  On baclofen RTC  and valium PRN. Will reassess and plan to discontinue PCA as soon as able. 3. Spoke to [address] [place] [address] with plan and she is in agreement. 4. Opioid induced constipation PA already ordered senna with colace. 5. Patient eating and drinking well. 6. Initial consult note routed to primary continuity provider  7. Communicated plan of care with: Palliative IDT       GOALS OF CARE / TREATMENT PREFERENCES:   [====Goals of Care====]  GOALS OF CARE:  Patient / health care proxy stated goals: \"to control back pain\"      TREATMENT PREFERENCES:   Code Status: Full Code    Advance Care Planning:  Advance Care Planning 3/29/2017   Patient's Healthcare Decision Maker is: Legal Next of Mago Yusuf   Primary Decision Maker Name Sly Nogueira   Primary Decision Maker Phone Number [phone]   Primary Decision Maker Relationship to Patient Spouse   Confirm Advance Directive None   Patient Would Like to Complete Advance Directive No       Other:    The palliative care team has discussed with patient / health care proxy about goals of care / treatment preferences for patient.  [====Goals of Care====]         HISTORY:     History obtained from: chart, patient and patient's     CHIEF COMPLAINT: Lower back pain with soreness in hips, and thighs, rating pain 7/10, back spasms    HPI/SUBJECTIVE:    The patient is:   [x] Verbal and participatory  [] Non-participatory due to:   Ms. Winston Villegas is a 33year old who had a microdiscectomy- L5-S1- on 2/6/17.   Admitted for spasms 3/5/17, 3/5/17- revision of discectomy and found to have infection, 3/10- went home with PICC and antibiotics, 3/21- readmitted for low back pain, 3/24- I &D of lumbar area    Clinical Pain Assessment (nonverbal scale for severity on nonverbal patients):   [++++ Clinical Pain Assessment++++]  [++++Pain Severity++++]: Pain: 7  [++++Pain Character++++]: spasms, sharp  [++++Pain Duration++++]: for at least 3 weeks  [++++Pain Effect++++]: have to lay flat  [++++Pain Factors++++]:   [++++Pain Frequency++++]: worse with movements  [++++Pain Location++++]: lower back  [++++ Clinical Pain Assessment++++]     FUNCTIONAL ASSESSMENT:     Palliative Performance Scale (PPS):  PPS: 90       PSYCHOSOCIAL/SPIRITUAL SCREENING:     Advance Care Planning:  Advance Care Planning 3/29/2017   Patient's Healthcare Decision Maker is: Legal Next of Mago Yusuf   Primary Decision Maker Name Kristin Roldan   Primary Decision Maker Phone Number [phone]   Primary Decision Maker Relationship to Patient Spouse   Confirm Advance Directive None   Patient Would Like to Complete Advance Directive No        Any spiritual / Religion concerns:  [] Yes /  [x] No    Caregiver Burnout:  [] Yes /  [x] No /  [] No Caregiver Present      Anticipatory grief assessment:   [x] Normal  / [] Maladaptive       ESAS Anxiety: Anxiety: 0    ESAS Depression: Depression: 0        REVIEW OF SYSTEMS:     Positive and pertinent negative findings in ROS are noted above in HPI. The following systems were [x] reviewed / [] unable to be reviewed as noted in HPI  Other findings are noted below. Systems: constitutional, ears/nose/mouth/throat, respiratory, gastrointestinal, genitourinary, musculoskeletal, integumentary, neurologic, psychiatric, endocrine. Positive findings noted below. Modified ESAS Completed by: provider   Fatigue: 1 Drowsiness: 1   Depression: 0 Pain: 7   Anxiety: 0 Nausea: 0   Anorexia: 0 Dyspnea: 0     Constipation: Yes     Stool Occurrence(s): 1        PHYSICAL EXAM:     From RN flowsheet:  Wt Readings from Last 3 Encounters:   03/27/17 110 lb 0.2 oz (49.9 kg)   03/11/17 112 lb (50.8 kg)   03/05/17 109 lb (49.4 kg)     Blood pressure 108/66, pulse 99, temperature 98.6 °F (37 °C), resp. rate 17, height 5' 3\" (1.6 m), weight 110 lb 0.2 oz (49.9 kg), last menstrual period 03/03/2017, SpO2 90 %.     Pain Scale 1: Numeric (0 - 10)  Pain Intensity 1: 8  Pain Onset 1: constant, sx  Pain Location 1: Back  Pain Orientation 1: Lower  Pain Description 1: Constant, Cramping, Heaviness  Pain Intervention(s) 1: Medication (see MAR)  Last bowel movement, if known:     Constitutional: Young woman laying flat in bed in NAD  Eyes: pupils equal, anicteric  ENMT: no nasal discharge, moist mucous membranes  Cardiovascular: regular rhythm, distal pulses intact  Respiratory: breathing not labored, symmetric  Gastrointestinal: soft non-tender, +bowel sounds  Musculoskeletal: no deformity, no tenderness to palpation  Skin: warm, dry  Neurologic: following commands, moving all extremities, sleepy at times  Psychiatric: full affect, no hallucinations  Other: worried about addiction to pain medications, we discussed that she should be starting to heal and will not be needing as much, trying other things besides opiods       HISTORY:     Active Problems:    Post-operative infection (3/21/2017)      Past Medical History:   Diagnosis Date    Ill-defined condition     elevated cholesterol      Past Surgical History:   Procedure Laterality Date    HX ORTHOPAEDIC  02/2017    back surgery w/ Dr Garfield Chawla HX OTHER SURGICAL      Lasik eye surgery      History reviewed. No pertinent family history. History reviewed, no pertinent family history.   Social History   Substance Use Topics    Smoking status: Never Smoker    Smokeless tobacco: Never Used    Alcohol use 2.4 oz/week     2 Glasses of wine, 2 Cans of beer per week     No Known Allergies   Current Facility-Administered Medications   Medication Dose Route Frequency    baclofen (LIORESAL) tablet 5 mg  5 mg Oral TID    senna-docusate (PERICOLACE) 8.6-50 mg per tablet 1 Tab  1 Tab Oral BID    diclofenac EC (VOLTAREN) tablet 50 mg  50 mg Oral BID    calcium carbonate (TUMS) chewable tablet 400 mg [elemental]  400 mg Oral TID WITH MEALS    HYDROmorphone (PF) (DILAUDID) injection 1 mg  1 mg IntraVENous Q3H PRN    cefepime (MAXIPIME) 2 g in 0.9% sodium chloride (MBP/ADV) 100 mL  2 g IntraVENous Q8H  magnesium hydroxide (MILK OF MAGNESIA) 400 mg/5 mL oral suspension 15 mL  15 mL Oral DAILY PRN    ciprofloxacin HCl (CIPRO) tablet 500 mg  500 mg Oral Q12H    alteplase (CATHFLO) injection 1 mg  1 mg InterCATHeter PRN    HYDROmorphone (PF) 15 mg/30 ml (DILAUDID) PCA   IntraVENous TITRATE    diazePAM (VALIUM) injection 5 mg  5 mg IntraVENous Q6H PRN    norethindrone-ethinyl estradiol (JUNEL FE 1/20) 1 mg-20 mcg (21)/75 mg (7) per tablet 1 Tab  1 Tab Oral DAILY    promethazine (PHENERGAN) tablet 25 mg  25 mg Oral Q6H PRN    0.9% sodium chloride infusion  100 mL/hr IntraVENous CONTINUOUS    oxyCODONE-acetaminophen (PERCOCET) 5-325 mg per tablet 1 Tab  1 Tab Oral Q4H PRN    oxyCODONE-acetaminophen (PERCOCET 10)  mg per tablet 1 Tab  1 Tab Oral Q4H PRN    sodium chloride (NS) flush 5-10 mL  5-10 mL IntraVENous Q8H    sodium chloride (NS) flush 5-10 mL  5-10 mL IntraVENous PRN    acetaminophen (TYLENOL) tablet 650 mg  650 mg Oral Q4H PRN    naloxone (NARCAN) injection 0.4 mg  0.4 mg IntraVENous PRN    diphenhydrAMINE (BENADRYL) injection 12.5 mg  12.5 mg IntraVENous Q4H PRN          LAB AND IMAGING FINDINGS:     Lab Results   Component Value Date/Time    WBC 5.2 03/29/2017 10:11 AM    HGB 10.1 03/29/2017 10:11 AM    PLATELET 555 93/66/0323 10:11 AM     Lab Results   Component Value Date/Time    Sodium 139 03/29/2017 10:11 AM    Potassium 3.8 03/29/2017 10:11 AM    Chloride 101 03/29/2017 10:11 AM    CO2 34 03/29/2017 10:11 AM    BUN 9 03/29/2017 10:11 AM    Creatinine 0.63 03/29/2017 10:11 AM    Calcium 9.1 03/29/2017 10:11 AM      Lab Results   Component Value Date/Time    AST (SGOT) 21 03/21/2017 04:26 PM    Alk.  phosphatase 112 03/21/2017 04:26 PM    Protein, total 8.0 03/21/2017 04:26 PM    Albumin 3.0 03/21/2017 04:26 PM    Globulin 5.0 03/21/2017 04:26 PM     Lab Results   Component Value Date/Time    INR 1.1 03/09/2017 05:56 AM    Prothrombin time 11.0 03/09/2017 05:56 AM      No results found for: IRON, FE, TIBC, IBCT, PSAT, FERR   No results found for: PH, PCO2, PO2  No components found for: [place]   Lab Results   Component Value Date/Time    CK 25 03/29/2017 10:11 AM                Total time: 70 minutes  Counseling / coordination time: 50 minutes  > 50% counseling / coordination?: yes    Prolonged service was provided for  []30 min   []75 min in face to face time in the presence of the patient. Time Start:   Time End:   Note: this can only be billed with 90989 (initial) or 60210 (follow up). If multiple start / stop times, list each separately.

## 2017-03-29 NOTE — INTERDISCIPLINARY ROUNDS
[address]    Patient Information    Name: Lizzie Kong  Age: 33 y.o. Admission Date: 3/21/2017  Surgery/Procedure: Procedure(s):  [address] LUMBAR  Attending Provider: Saad Tom MD  Surgeon: Antonina Barlow   Problem List: Active Problems:    Post-operative infection (3/21/2017)      During rounds the following quality care indicators and evidence based practices were addressed :       PT/OT: Patient mobility - having difficulty, couldn't get OOB yesterday, terrible spasms  Bed Mobility Training  Rolling: Maximum assistance, Additional time, Assist x1  Supine to Sit: Total assistance, Additional time, Assist x1  Sit to Supine: Total assistance, Additional time, Assist x1  Scooting:  Total assistance, Additional time, Assist x1  Transfer Training  Sit to Stand:  (unable to come to stand due to pain, anxiety and spasms)  Stand to Sit: Minimum assistance, Additional time, Adaptive equipment      Gait Training  Assistive Device: Walker, rolling, Gait belt, Brace/Splint  Ambulation - Level of Assistance: Maximum assistance, Assist x2  Distance (ft): 5 Feet (ft)          Pain Assessment  Pain Intensity 1: 8 (03/29/17 0700)  Pain Location 1: Back  Pain Intervention(s) 1: Medication (see MAR)  Patient Stated Pain Goal: 4    Pain meds: dilaudid pca, percocet  Antibiotics completed: cipro, cefepime  Anticoagulation:  none  Prabhakar: N   Goals For Today: Progress with PT, pain control    RRAT Score:      Readmit Risk Tool  Support Systems: Family member(s), Spouse/Significant Other/Partner  Relationship with Primary Physician Group: Seen at least one time within the past 6 months    Discharge Management/Planning:    Readmit Risk Assessment Information:   Low Risk            12       Total Score        3 Relationship with PCP    2 Patient Living Status    3 Patient Length of Stay > 5    4 More than 1 Admission in calendar year        Criteria that do not apply:    Patient Insurance is Medicare, Medicaid or Self Pay    Charlson Comorbidity Score         Readmit Risk Tool  Support Systems: Family member(s), Spouse/Significant Other/Partner  Relationship with Primary Physician Group: Seen at least one time within the past 6 months    [facility] [place]: [id number]:  Anticipated Date of Discharge: TBD    Interdisciplinary team rounds were held with the following team members: Nurse Practitioner, Case Management, Nursing, Pharmacy, and Rehab. See clinical pathway and/or care plan for interventions and desired outcomes.

## 2017-03-30 LAB
ANION GAP BLD CALC-SCNC: 6 MMOL/L (ref 5–15)
BUN SERPL-MCNC: 12 MG/DL (ref 6–20)
BUN/CREAT SERPL: 19 (ref 12–20)
CALCIUM SERPL-MCNC: 8.7 MG/DL (ref 8.5–10.1)
CHLORIDE SERPL-SCNC: 103 MMOL/L (ref 97–108)
CO2 SERPL-SCNC: 29 MMOL/L (ref 21–32)
CREAT SERPL-MCNC: 0.64 MG/DL (ref 0.55–1.02)
ERYTHROCYTE [DISTWIDTH] IN BLOOD BY AUTOMATED COUNT: 12.1 % (ref 11.5–14.5)
GLUCOSE SERPL-MCNC: 91 MG/DL (ref 65–100)
HCT VFR BLD AUTO: 29.1 % (ref 35–47)
HGB BLD-MCNC: 10.2 G/DL (ref 11.5–16)
MCH RBC QN AUTO: 32.1 PG (ref 26–34)
MCHC RBC AUTO-ENTMCNC: 35.1 G/DL (ref 30–36.5)
MCV RBC AUTO: 91.5 FL (ref 80–99)
PLATELET # BLD AUTO: 327 K/UL (ref 150–400)
POTASSIUM SERPL-SCNC: 4.2 MMOL/L (ref 3.5–5.1)
RBC # BLD AUTO: 3.18 M/UL (ref 3.8–5.2)
SODIUM SERPL-SCNC: 138 MMOL/L (ref 136–145)
WBC # BLD AUTO: 4.8 K/UL (ref 3.6–11)

## 2017-03-30 PROCEDURE — 97535 SELF CARE MNGMENT TRAINING: CPT | Performed by: OCCUPATIONAL THERAPIST

## 2017-03-30 PROCEDURE — 74011250636 HC RX REV CODE- 250/636: Performed by: PHYSICIAN ASSISTANT

## 2017-03-30 PROCEDURE — 74011250637 HC RX REV CODE- 250/637: Performed by: NURSE PRACTITIONER

## 2017-03-30 PROCEDURE — 74011250637 HC RX REV CODE- 250/637: Performed by: PHYSICIAN ASSISTANT

## 2017-03-30 PROCEDURE — 65270000029 HC RM PRIVATE

## 2017-03-30 PROCEDURE — 36415 COLL VENOUS BLD VENIPUNCTURE: CPT | Performed by: PHYSICIAN ASSISTANT

## 2017-03-30 PROCEDURE — 74011000258 HC RX REV CODE- 258: Performed by: INTERNAL MEDICINE

## 2017-03-30 PROCEDURE — 97116 GAIT TRAINING THERAPY: CPT

## 2017-03-30 PROCEDURE — 97530 THERAPEUTIC ACTIVITIES: CPT | Performed by: OCCUPATIONAL THERAPIST

## 2017-03-30 PROCEDURE — 74011250637 HC RX REV CODE- 250/637: Performed by: INTERNAL MEDICINE

## 2017-03-30 PROCEDURE — 97530 THERAPEUTIC ACTIVITIES: CPT

## 2017-03-30 PROCEDURE — 80048 BASIC METABOLIC PNL TOTAL CA: CPT | Performed by: PHYSICIAN ASSISTANT

## 2017-03-30 PROCEDURE — 85027 COMPLETE CBC AUTOMATED: CPT | Performed by: PHYSICIAN ASSISTANT

## 2017-03-30 PROCEDURE — 74011250636 HC RX REV CODE- 250/636: Performed by: INTERNAL MEDICINE

## 2017-03-30 RX ORDER — DIAZEPAM 5 MG/1
5 TABLET ORAL
Status: DISCONTINUED | OUTPATIENT
Start: 2017-03-30 | End: 2017-04-04 | Stop reason: HOSPADM

## 2017-03-30 RX ORDER — HYDROMORPHONE HYDROCHLORIDE 2 MG/1
2 TABLET ORAL
Status: DISCONTINUED | OUTPATIENT
Start: 2017-03-30 | End: 2017-03-31

## 2017-03-30 RX ORDER — HYDROMORPHONE HYDROCHLORIDE 1 MG/ML
1 INJECTION, SOLUTION INTRAMUSCULAR; INTRAVENOUS; SUBCUTANEOUS
Status: DISCONTINUED | OUTPATIENT
Start: 2017-03-30 | End: 2017-03-31

## 2017-03-30 RX ADMIN — OXYCODONE HYDROCHLORIDE AND ACETAMINOPHEN 1 TABLET: 10; 325 TABLET ORAL at 22:46

## 2017-03-30 RX ADMIN — DIAZEPAM 5 MG: 5 INJECTION, SOLUTION INTRAMUSCULAR; INTRAVENOUS at 08:34

## 2017-03-30 RX ADMIN — DICLOFENAC SODIUM 50 MG: 25 TABLET, DELAYED RELEASE ORAL at 17:59

## 2017-03-30 RX ADMIN — OXYCODONE HYDROCHLORIDE AND ACETAMINOPHEN 1 TABLET: 10; 325 TABLET ORAL at 12:01

## 2017-03-30 RX ADMIN — BACLOFEN 5 MG: 10 TABLET ORAL at 16:30

## 2017-03-30 RX ADMIN — DIAZEPAM 5 MG: 5 INJECTION, SOLUTION INTRAMUSCULAR; INTRAVENOUS at 00:52

## 2017-03-30 RX ADMIN — HYDROMORPHONE HYDROCHLORIDE 2 MG: 2 TABLET ORAL at 20:45

## 2017-03-30 RX ADMIN — OXYCODONE HYDROCHLORIDE AND ACETAMINOPHEN 1 TABLET: 10; 325 TABLET ORAL at 18:23

## 2017-03-30 RX ADMIN — DOCUSATE SODIUM -SENNOSIDES 1 TABLET: 50; 8.6 TABLET, COATED ORAL at 08:33

## 2017-03-30 RX ADMIN — MAGNESIUM HYDROXIDE 15 ML: 400 SUSPENSION ORAL at 12:01

## 2017-03-30 RX ADMIN — DICLOFENAC SODIUM 50 MG: 25 TABLET, DELAYED RELEASE ORAL at 08:33

## 2017-03-30 RX ADMIN — HYDROMORPHONE HYDROCHLORIDE 1 MG: 1 INJECTION, SOLUTION INTRAMUSCULAR; INTRAVENOUS; SUBCUTANEOUS at 23:46

## 2017-03-30 RX ADMIN — DIAZEPAM 5 MG: 5 TABLET ORAL at 22:08

## 2017-03-30 RX ADMIN — CEFEPIME 2 G: 2 INJECTION, POWDER, FOR SOLUTION INTRAVENOUS at 06:13

## 2017-03-30 RX ADMIN — Medication 10 ML: at 22:48

## 2017-03-30 RX ADMIN — Medication 10 ML: at 06:13

## 2017-03-30 RX ADMIN — CEFEPIME 2 G: 2 INJECTION, POWDER, FOR SOLUTION INTRAVENOUS at 22:46

## 2017-03-30 RX ADMIN — OXYCODONE HYDROCHLORIDE AND ACETAMINOPHEN 1 TABLET: 10; 325 TABLET ORAL at 06:13

## 2017-03-30 RX ADMIN — CIPROFLOXACIN HYDROCHLORIDE 500 MG: 500 TABLET, FILM COATED ORAL at 20:45

## 2017-03-30 RX ADMIN — OXYCODONE HYDROCHLORIDE AND ACETAMINOPHEN 1 TABLET: 10; 325 TABLET ORAL at 02:35

## 2017-03-30 RX ADMIN — Medication 10 ML: at 15:13

## 2017-03-30 RX ADMIN — HYDROMORPHONE HYDROCHLORIDE 2 MG: 2 TABLET ORAL at 15:13

## 2017-03-30 RX ADMIN — CEFEPIME 2 G: 2 INJECTION, POWDER, FOR SOLUTION INTRAVENOUS at 15:13

## 2017-03-30 RX ADMIN — BACLOFEN 5 MG: 10 TABLET ORAL at 08:33

## 2017-03-30 RX ADMIN — CALCIUM CARBONATE (ANTACID) CHEW TAB 500 MG 400 MG: 500 CHEW TAB at 16:30

## 2017-03-30 RX ADMIN — Medication: at 07:39

## 2017-03-30 RX ADMIN — CALCIUM CARBONATE (ANTACID) CHEW TAB 500 MG 400 MG: 500 CHEW TAB at 12:01

## 2017-03-30 RX ADMIN — DOCUSATE SODIUM -SENNOSIDES 1 TABLET: 50; 8.6 TABLET, COATED ORAL at 17:59

## 2017-03-30 RX ADMIN — CALCIUM CARBONATE (ANTACID) CHEW TAB 500 MG 400 MG: 500 CHEW TAB at 08:33

## 2017-03-30 RX ADMIN — BACLOFEN 5 MG: 10 TABLET ORAL at 22:47

## 2017-03-30 RX ADMIN — CIPROFLOXACIN HYDROCHLORIDE 500 MG: 500 TABLET, FILM COATED ORAL at 08:33

## 2017-03-30 NOTE — PROGRESS NOTES
ORTHOPAEDIC SPINE SURGERY PROGRESS NOTE    NAME:     Chris Wilder   :       1985   MRN:       [id number]   DATE:      3/30/2017    POD:    6 Days Post-Op  S/P:    Procedure(s):  SPINE INCISION AND DEBRIDEMENT LUMBAR    SUBJECTIVE:    C/O some right sided back pain, occasionally has some pain that radiates to right buttock, spasms improved  No leg pain or numbness  Denies nausea/vomiting, chest pain, headache or shortness of breath  Pain controlled    Recent Labs      17   0620   HGB  10.2*   HCT  29.1*   NA  138   K  4.2   CL  103   CO2  29   BUN  12   CREA  0.64   GLU  91     Patient Vitals for the past 12 hrs:   BP Temp Pulse Resp SpO2   17 1207 130/86 98 °F (36.7 °C) 85 18 99 %   17 0630 101/59 98.1 °F (36.7 °C) 81 18 95 %   17 0222 96/64 97.6 °F (36.4 °C) 80 18 96 %       EXAM:  Dressings clean, dry and intact   Positive strength/ROM bilat lower ext.   Neuro intact to sensation  Calves, soft & nontender  BL LEs NVID      PLAN:  Continue pain medications  PT/OT, OOB w/ assistance  Advance regular diet  Palliative Care and ID also following      RUPINDER Hobbs  Orthopaedic Surgery  Physician Assistant to Dr. Huma Chand

## 2017-03-30 NOTE — PROGRESS NOTES
Bedside and Verbal shift change report given to [address] [facility] (oncoming nurse) by Cora Reynolds (offgoing nurse). Report included the following information SBAR, Kardex, Intake/Output, MAR and Recent Results.

## 2017-03-30 NOTE — PROGRESS NOTES
Problem: Mobility Impaired (Adult and Pediatric)  Goal: *Acute Goals and Plan of Care (Insert Text)  Physical Therapy Goals  Initiated 3/26/2017    1. Patient will move from supine to sit and sit to supine , scoot up and down and roll side to side in bed with independence within 4 days. 2. Patient will perform sit to stand with independence within 4 days. 3. Patient will ambulate with independence for 200 feet with the least restrictive device within 4 days. 4. Patient will ascend/descend 4 stairs with handrail(s) with independence within 4 days. 5. Patient will verbalize and demonstrate understanding of spinal precautions (No bending, lifting greater than 5 lbs, or twisting; log-roll technique; frequent repositioning as instructed) within 4 days. PHYSICAL THERAPY TREATMENT  Patient: Maria Del Carmen Soto (83 y.o. female)  Date: 3/30/2017  Diagnosis: Post-operative infection  Status Post surgery infection <principal problem not specified>  Procedure(s) (LRB):  SPINE INCISION AND DEBRIDEMENT LUMBAR (N/A) 6 Days Post-Op  Precautions: Fall, Back (LSO brace when OOB; neutral spine)      ASSESSMENT:  Pt received in bed, agreeable to participate with physical therapy. Supine<>sitting EOB with log roll technique with min A. Total A to don LSO. Continues to demonstrate increased BUE support with sitting EOB. Sit<>stand using RW with min A. Gait training using RW x 60' with CGA. Demonstrates slow, guarded gait with increased WB through BUE but improving. Pt returned to bed, review of BLE disassociation exercise. No back spasms during this session. Improving tolerance to activity. Family present and attentive throughout session.   Progression toward goals:  [ ]      Improving appropriately and progressing toward goals  [X]      Improving slowly and progressing toward goals  [ ]      Not making progress toward goals and plan of care will be adjusted       PLAN:  Patient continues to benefit from skilled intervention to address the above impairments. Continue treatment per established plan of care. Discharge Recommendations:  Rehab v HHPT depending progress  Further Equipment Recommendations for Discharge:  none       SUBJECTIVE:   Patient stated I am dong pretty good.    The patient stated 3/3 back precautions. Reviewed all 3 with patient. OBJECTIVE DATA SUMMARY:   Critical Behavior:  Neurologic State: Alert, Appropriate for age  Orientation Level: Oriented X4  Cognition: Appropriate for age attention/concentration, Appropriate decision making, Appropriate safety awareness, Follows commands  Safety/Judgement: Awareness of environment, Fall prevention, Good awareness of safety precautions, Insight into deficits  Functional Mobility Training:  Bed Mobility:  Log Rolling: Contact guard assistance; Additional time;Assist x1  Supine to Sit: Minimum assistance  Sit to Supine: Minimum assistance  Scooting: Minimum assistance        Brace donned with  total assistance   Transfers:  Sit to Stand: Minimum assistance  Stand to Sit: Minimum assistance                             Balance:  Sitting: Intact; With support  Standing: Intact; With support (RW)  Standing - Static: Fair;Constant support  Standing - Dynamic : Fair  Ambulation/Gait Training:  Distance (ft): 60 Feet (ft)  Assistive Device: Walker, rolling;Gait belt;Brace/Splint  Ambulation - Level of Assistance: Contact guard assistance        Gait Abnormalities: Decreased step clearance                                                 Stairs: Therapeutic Exercises:      Pain:  Pain Scale 1: Numeric (0 - 10)  Pain Intensity 1: 4  Pain Location 1: Back  Pain Orientation 1: Right  Pain Description 1: Aching  Pain Intervention(s) 1: Medication (see MAR)  Activity Tolerance:   Good  Please refer to the flowsheet for vital signs taken during this treatment.   After treatment:   [ ]  Patient left in no apparent distress sitting up in chair  [X]  Patient left in no apparent distress in bed  [X]  Call bell left within reach  [X]  Nursing notified  [X]  Caregiver present  [ ]  Bed alarm activated      COMMUNICATION/COLLABORATION:   The patients plan of care was discussed with: Registered Nurse     Demario Marrero   Time Calculation: 24 mins

## 2017-03-30 NOTE — PROGRESS NOTES
Bedside report received from PHILIP Amador; PCA verified, pt sleeping through report. Returned to room to assess pt, pt still sleeping, O2 93% on room air, pt difficult to arouse waking only to shaking. Falls back asleep immediately. 0740 - Bedside shift change report given to King Hernandez, [facility] (oncoming nurse) by Ilsa Pollack RN (offgoing nurse). Report included the following information SBAR, Kardex, Procedure Summary, Intake/Output, MAR and Recent Results.

## 2017-03-30 NOTE — PROGRESS NOTES
Problem: Pain  Goal: *Control of Pain  Outcome: Progressing Towards Goal  Pt reports pain 6-9/10 but is frequently drowsy and difficult to arouse, low O2 sats overnight.  May need pain meds adjusted

## 2017-03-30 NOTE — CONSULTS
Palliative Medicine Consult  Arturo: [phone] ([id number])    Patient Name: April Falk  YOB: 1985    Date of Initial Consult: 3/29/2017  Reason for Consult: Pain management  Requesting Provider: RUPINDER Kaur   Primary Care Physician: Bridgette Ni. Tee Shirley MD      SUMMARY:   April Falk is a 33 y.o. with a past history of HLD, back pain, patient had back surgery- L5-S1 microdiscectomy by Dr. Schneider Bending 2/6/17, recovered well until 3/5/17 when she developed significant back spasms and admitted for intractable pain, on 3/7/17 had a revision of discectomy and found to have a post op infection growing enterobacter, had PICC placed and 3/10/17 went home and received IV antibiotics. Then on 3/21/2017 she was admitted  from home  with a diagnosis of 7/10 throbbing back pain, 3/24 returned to OR for I&D still growing enterobactor. Day +6 s/p post op from I &D. Still having terrible back spasms and pain. Current medical issues leading to Palliative Medicine involvement include: pain management, opioid induced constipation, osteomyelitis of disc. PALLIATIVE DIAGNOSES:   1. Back pain  2. Back spasms  3. Debility- has been laying flat   4. Opioid induced constipation- no BM for 7 days       PLAN:   1. Met with patient this morning. She states she is doing much better. She has been working with PT and got OOB yesterday and today. She walked a small amount. She is currently on dilaudid PCA- 0.5 dose, no basal with 3 mg/ 4 hour limit. Since 7 AM with 4 demands and 4 deliveries- used 0.80 mg, since 7 AM, percocet X 3/ last 24 hours, valium X 2 last 24 hours. Rating pain 6/10 at present as she just finished working with PT.  2. Pain: Recommendations: Stop dilaudid PCA. Ordered dilaudid po 2 mg every 4 hours prn. Encouraged patient to ask for percocet first and if pain severe can have dilaudid 2 mg po every 4 hours as needed. Changed valium to po. Left doses of IV dilaudid if pain uncontrollable.   3. Goals for her to use less and have better pain control. Pain is about 7/10. Some radiation of pain to left buttock with less spasms. Continue diclofenac 50 mg BID and baclofen 5 mg. Will reassess. 4. Opioid induced constipation PA already ordered senna with colace. Still no BM. Good bowel sounds and passing gas. 5. Patient eating and drinking well. 6. Initial consult note routed to primary continuity provider  7. Communicated plan of care with: Palliative IDT       GOALS OF CARE / TREATMENT PREFERENCES:   [====Goals of Care====]  GOALS OF CARE:  Patient / health care proxy stated goals: \"to control back pain\"      TREATMENT PREFERENCES:   Code Status: Full Code    Advance Care Planning:  Advance Care Planning 3/29/2017   Patient's Healthcare Decision Maker is: Legal Next of Mago Yusuf   Primary Decision Maker Name Esteban Moreno   Primary Decision Maker Phone Number [phone]   Primary Decision Maker Relationship to Patient Spouse   Confirm Advance Directive None   Patient Would Like to Complete Advance Directive No       Other:    The palliative care team has discussed with patient / health care proxy about goals of care / treatment preferences for patient.  [====Goals of Care====]         HISTORY:     History obtained from: chart, patient and patient's     CHIEF COMPLAINT: Lower back pain with soreness in hips, and thighs, rating pain 7/10, back spasms    HPI/SUBJECTIVE:    The patient is:   [x] Verbal and participatory  [] Non-participatory due to:   Ms. Vanesa Castellon is a 33year old who had a microdiscectomy- L5-S1- on 2/6/17.   Admitted for spasms 3/5/17, 3/5/17- revision of discectomy and found to have infection, 3/10- went home with PICC and antibiotics, 3/21- readmitted for low back pain, 3/24- I &D of lumbar area    Clinical Pain Assessment (nonverbal scale for severity on nonverbal patients):   [++++ Clinical Pain Assessment++++]  [++++Pain Severity++++]: Pain: 7  [++++Pain Character++++]: spasms, sharp  [++++Pain Duration++++]: for at least 3 weeks  [++++Pain Effect++++]: have to lay flat  [++++Pain Factors++++]:   [++++Pain Frequency++++]: worse with movements  [++++Pain Location++++]: lower back  [++++ Clinical Pain Assessment++++]     FUNCTIONAL ASSESSMENT:     Palliative Performance Scale (PPS):  PPS: 90       PSYCHOSOCIAL/SPIRITUAL SCREENING:     Advance Care Planning:  Advance Care Planning 3/29/2017   Patient's Healthcare Decision Maker is: Legal Next of Mago 70   Primary Decision Maker Name Juan Jay   Primary Decision Maker Phone Number [phone]   Primary Decision Maker Relationship to Patient Spouse   Confirm Advance Directive None   Patient Would Like to Complete Advance Directive No        Any spiritual / Tenriism concerns:  [] Yes /  [x] No    Caregiver Burnout:  [] Yes /  [x] No /  [] No Caregiver Present      Anticipatory grief assessment:   [x] Normal  / [] Maladaptive       ESAS Anxiety: Anxiety: 0    ESAS Depression: Depression: 0        REVIEW OF SYSTEMS:     Positive and pertinent negative findings in ROS are noted above in HPI. The following systems were [x] reviewed / [] unable to be reviewed as noted in HPI  Other findings are noted below. Systems: constitutional, ears/nose/mouth/throat, respiratory, gastrointestinal, genitourinary, musculoskeletal, integumentary, neurologic, psychiatric, endocrine. Positive findings noted below. Modified ESAS Completed by: provider   Fatigue: 1 Drowsiness: 1   Depression: 0 Pain: 7   Anxiety: 0 Nausea: 0   Anorexia: 0 Dyspnea: 0     Constipation: Yes     Stool Occurrence(s): 1        PHYSICAL EXAM:     From RN flowsheet:  Wt Readings from Last 3 Encounters:   03/27/17 110 lb 0.2 oz (49.9 kg)   03/11/17 112 lb (50.8 kg)   03/05/17 109 lb (49.4 kg)     Blood pressure 130/86, pulse 85, temperature 98 °F (36.7 °C), resp. rate 18, height 5' 3\" (1.6 m), weight 110 lb 0.2 oz (49.9 kg), last menstrual period 03/03/2017, SpO2 99 %.     Pain Scale 1: Numeric (0 - 10)  Pain Intensity 1: 7  Pain Onset 1: post op  Pain Location 1: Back  Pain Orientation 1: Right  Pain Description 1: Aching  Pain Intervention(s) 1: Medication (see MAR)  Last bowel movement, if known:     Constitutional: Young woman laying flat in bed in NAD  Eyes: pupils equal, anicteric  ENMT: no nasal discharge, moist mucous membranes  Cardiovascular: regular rhythm, distal pulses intact  Respiratory: breathing not labored, symmetric  Gastrointestinal: soft non-tender, +bowel sounds  Musculoskeletal: no deformity, no tenderness to palpation  Skin: warm, dry  Neurologic: following commands, moving all extremities, less sleepy this afternoon  Psychiatric: full affect, no hallucinations  Other: worried about addiction to pain medications, we discussed that she should be starting to heal and will not be needing as much, trying other things besides opiods       HISTORY:     Active Problems:    Post-operative infection (3/21/2017)      Past Medical History:   Diagnosis Date    Ill-defined condition     elevated cholesterol      Past Surgical History:   Procedure Laterality Date    HX ORTHOPAEDIC  02/2017    back surgery w/ Dr Jesus Hoyos HX OTHER SURGICAL      Lasik eye surgery      History reviewed. No pertinent family history. History reviewed, no pertinent family history.   Social History   Substance Use Topics    Smoking status: Never Smoker    Smokeless tobacco: Never Used    Alcohol use 2.4 oz/week     2 Glasses of wine, 2 Cans of beer per week     No Known Allergies   Current Facility-Administered Medications   Medication Dose Route Frequency    HYDROmorphone (DILAUDID) tablet 2 mg  2 mg Oral Q4H PRN    diazePAM (VALIUM) tablet 5 mg  5 mg Oral Q6H PRN    baclofen (LIORESAL) tablet 5 mg  5 mg Oral TID    senna-docusate (PERICOLACE) 8.6-50 mg per tablet 1 Tab  1 Tab Oral BID    diclofenac EC (VOLTAREN) tablet 50 mg  50 mg Oral BID    calcium carbonate (TUMS) chewable tablet 400 mg [elemental]  400 mg Oral TID WITH MEALS    HYDROmorphone (PF) (DILAUDID) injection 1 mg  1 mg IntraVENous Q3H PRN    cefepime (MAXIPIME) 2 g in 0.9% sodium chloride (MBP/ADV) 100 mL  2 g IntraVENous Q8H    magnesium hydroxide (MILK OF MAGNESIA) 400 mg/5 mL oral suspension 15 mL  15 mL Oral DAILY PRN    ciprofloxacin HCl (CIPRO) tablet 500 mg  500 mg Oral Q12H    alteplase (CATHFLO) injection 1 mg  1 mg InterCATHeter PRN    norethindrone-ethinyl estradiol (JUNEL FE 1/20) 1 mg-20 mcg (21)/75 mg (7) per tablet 1 Tab  1 Tab Oral DAILY    promethazine (PHENERGAN) tablet 25 mg  25 mg Oral Q6H PRN    0.9% sodium chloride infusion  100 mL/hr IntraVENous CONTINUOUS    oxyCODONE-acetaminophen (PERCOCET) 5-325 mg per tablet 1 Tab  1 Tab Oral Q4H PRN    oxyCODONE-acetaminophen (PERCOCET 10)  mg per tablet 1 Tab  1 Tab Oral Q4H PRN    sodium chloride (NS) flush 5-10 mL  5-10 mL IntraVENous Q8H    sodium chloride (NS) flush 5-10 mL  5-10 mL IntraVENous PRN    acetaminophen (TYLENOL) tablet 650 mg  650 mg Oral Q4H PRN    naloxone (NARCAN) injection 0.4 mg  0.4 mg IntraVENous PRN    diphenhydrAMINE (BENADRYL) injection 12.5 mg  12.5 mg IntraVENous Q4H PRN          LAB AND IMAGING FINDINGS:     Lab Results   Component Value Date/Time    WBC 4.8 03/30/2017 06:20 AM    HGB 10.2 03/30/2017 06:20 AM    PLATELET 344 55/57/0876 06:20 AM     Lab Results   Component Value Date/Time    Sodium 138 03/30/2017 06:20 AM    Potassium 4.2 03/30/2017 06:20 AM    Chloride 103 03/30/2017 06:20 AM    CO2 29 03/30/2017 06:20 AM    BUN 12 03/30/2017 06:20 AM    Creatinine 0.64 03/30/2017 06:20 AM    Calcium 8.7 03/30/2017 06:20 AM      Lab Results   Component Value Date/Time    AST (SGOT) 21 03/21/2017 04:26 PM    Alk.  phosphatase 112 03/21/2017 04:26 PM    Protein, total 8.0 03/21/2017 04:26 PM    Albumin 3.0 03/21/2017 04:26 PM    Globulin 5.0 03/21/2017 04:26 PM     Lab Results   Component Value Date/Time    INR 1.1 03/09/2017 05:56 AM    Prothrombin time 11.0 03/09/2017 05:56 AM      No results found for: IRON, FE, TIBC, IBCT, PSAT, FERR   No results found for: PH, PCO2, PO2  No components found for: [place]   Lab Results   Component Value Date/Time    CK 25 03/29/2017 10:11 AM                Total time: 70 minutes  Counseling / coordination time: 50 minutes  > 50% counseling / coordination?: yes    Prolonged service was provided for  []30 min   []75 min in face to face time in the presence of the patient. Time Start:   Time End:   Note: this can only be billed with 89170 (initial) or 52457 (follow up). If multiple start / stop times, list each separately.

## 2017-03-30 NOTE — PROGRESS NOTES
Problem: Self Care Deficits Care Plan (Adult)  Goal: *Acute Goals and Plan of Care (Insert Text)  Occupational Therapy Goals  Initiated 3/28/2017    1. Patient will perform lower body dressing with supervision/set-up using adaptive equipment PRN within 7 days. 2. Patient will perform toileting and toilet transfer with supervision/set-up using most appropriate DME within 7 days. 3. Patient will perform grooming standing at sink for 5 minutes or more at supervision/set-up within 7 days. 4. Patient will don/doff back brace at modified [place] within 7 days. 5. Patient will verbalize/demonstrate 3/3 back precautions during ADL tasks without cues within 7 days. OCCUPATIONAL THERAPY TREATMENT  Patient: Kyara Mejia (40 y.o. female)  Date: 3/30/2017  Diagnosis: Post-operative infection  Status Post surgery infection <principal problem not specified>  Procedure(s) (LRB):  SPINE INCISION AND DEBRIDEMENT LUMBAR (N/A) 6 Days Post-Op  Precautions: Fall, Back (LSO brace when OOB; neutral spine) No bending, no lifting greater than 5 lbs, no twisting, log-roll technique, repositioning every 20-30 min except when sleeping, LSO brace when OOB      ASSESSMENT:  Pt now making steady progress daily. She continues to c/o back and now R gluteal pain and increased anxiety, but with decreased muscle spasms with mobility. With increased time and mod facilitation of B quads she was able to do light bridging in bed to shift her hips toward the L to then log roll and come to sit EOB with min A. She was able to maintain her balance seated EOB for >5 minutes with BUE support. She came to stand with min A and using RW and was able to perform functional mobility in room so that her bed could be exchanged.   Next session while attempt shower as this is important to pt and her goal.  Progression toward goals:  [ ]          Improving appropriately and progressing toward goals  [X]          Improving slowly and progressing toward goals  [ ]          Not making progress toward goals and plan of care will be adjusted       PLAN:  Patient continues to benefit from skilled intervention to address the above impairments. Continue treatment per established plan of care. Discharge Recommendations:  Rehab vs Home Health  Further Equipment Recommendations for Discharge:  TBD       SUBJECTIVE:   Patient stated I am doing better.   The patient stated 3/3 back precautions. Reviewed all 3 with patient. OBJECTIVE DATA SUMMARY:   Cognitive/Behavioral Status:  Neurologic State: Alert, Appropriate for age  Orientation Level: Oriented X4  Cognition: Appropriate for age attention/concentration, Appropriate decision making, Appropriate safety awareness, Follows commands  Safety/Judgement: Awareness of environment, Fall prevention, Good awareness of safety precautions, Insight into deficits     Functional Mobility and Transfers for ADLs: Pt required 30 minutes to perform below due to increased anxiety, anticipation of pain and decreased endurance. Bed Mobility:  Rolling: Contact guard assistance; Additional time;Assist x1  Supine to Sit: Minimum assistance; Additional time;Assist x1 (A for upper body)  Sit to Supine: Moderate assistance; Additional time;Assist x1 (A for upper body and LEs)  Scooting: Minimum assistance; Additional time;Assist x1     Transfers:  Sit to Stand: Minimum assistance; Additional time;Assist x1 (with RW in front of pt)        Balance:  Sitting: Intact; With support  Standing: Intact; With support (RW)     ADL Intervention and Instruction:  Lower Body Dressing Assistance  Socks: Total assistance (dependent)  Leg Crossed Method Used: No  Position Performed: Seated edge of bed  Cues: Don;Physical assistance;Verbal cues provided;Visual cues provided     Cognitive Retraining  Safety/Judgement: Awareness of environment; Fall prevention;Good awareness of safety precautions; Insight into deficits      Bathing: Patient instructed and indicated understanding when bathing to not submerge wound in water, stand to shower or sponge bathe, cover wound with plastic and tape to ensure no water reaches bandage/wound without cues. Dressing brace: Patient instructed and demonstrated to don/doff velcro on brace using dominant side, keeping non-dominant side intact. Patient instructed and demonstrated in meantime of being able to stand with back against wall to don/doff brace, to don/doff seated using lap and bed/chair surface to support brace while manipulating. Dressing lower body: Patient instructed to don brace first and on the benefits to remain seated to don all clothing to increase independence with precautions and pain management. Toileting: Patient instructed on the benefits of using flushable wet wipes and toilet tongs if decreased reach or pain for dustin care. Also, the benefits of a reacher to aid in clothing management. Home safety: Patient instructed and indicated understanding on home modifications and safety (raise height of ADL objects, appropriate height of chair surfaces, recliner safety, change of floor surfaces, clear pathways) to increase independence and fall prevention. Standing: Patient instructed and indicated understanding to walk up to sink/counter top/surfaces, step into walker, square off while using objects, slide objects along surfaces, to increase adherence to back precautions and fall prevention. Patient instructed to increase amount of time standing in order to increase independence and tolerance with ADLs. During prolonged standing, can open cabinet door or place foot on stool to decrease spinal pressure/increase pain. Patient instructed and indicated understanding the benefits of maintaining activity tolerance, functional mobility, and independence with self care tasks during acute stay  to ensure safe return home and to baseline.  Encouraged patient to increase frequency and duration OOB, not sitting longer than 30 mins without marching/walking with staff, be out of bed for all meals, perform daily ADLs (as approved by RN/MD regarding bathing etc), and performing functional mobility to/from bathroom. Patient instruction and indicated understanding on body mechanics, ergonomics and gravitational force on the spine during different body positions to plan activities in prep for return home to complete instrumental ADLs and back to work safely. Pain:  Pain Scale 1: Numeric (0 - 10)  Pain Intensity 1: 7  Pain Location 1: Back  Pain Orientation 1: Right  Pain Description 1: Aching  Pain Intervention(s) 1: Medication (see MAR)     Activity Tolerance:   Poor-Fair  Please refer to the flowsheet for vital signs taken during this treatment.   After treatment:   [ ] Patient left in no apparent distress sitting up in chair  [X] Patient left in no apparent distress in bed  [X] Call bell left within reach  [X] Nursing notified  [X] Caregiver present - pt's   [ ] Bed alarm activated      COMMUNICATION/COLLABORATION:   The patients plan of care was discussed with: Physical Therapy Assistant and Registered Nurse     Arron Sams OT  Time Calculation: 48 mins

## 2017-03-31 LAB
ANION GAP BLD CALC-SCNC: 8 MMOL/L (ref 5–15)
BUN SERPL-MCNC: 13 MG/DL (ref 6–20)
BUN/CREAT SERPL: 17 (ref 12–20)
CALCIUM SERPL-MCNC: 9.2 MG/DL (ref 8.5–10.1)
CHLORIDE SERPL-SCNC: 100 MMOL/L (ref 97–108)
CO2 SERPL-SCNC: 28 MMOL/L (ref 21–32)
CREAT SERPL-MCNC: 0.77 MG/DL (ref 0.55–1.02)
ERYTHROCYTE [DISTWIDTH] IN BLOOD BY AUTOMATED COUNT: 12.1 % (ref 11.5–14.5)
GLUCOSE SERPL-MCNC: 97 MG/DL (ref 65–100)
HCT VFR BLD AUTO: 30.4 % (ref 35–47)
HGB BLD-MCNC: 10.5 G/DL (ref 11.5–16)
MCH RBC QN AUTO: 31.3 PG (ref 26–34)
MCHC RBC AUTO-ENTMCNC: 34.5 G/DL (ref 30–36.5)
MCV RBC AUTO: 90.5 FL (ref 80–99)
PLATELET # BLD AUTO: 434 K/UL (ref 150–400)
POTASSIUM SERPL-SCNC: 4.8 MMOL/L (ref 3.5–5.1)
RBC # BLD AUTO: 3.36 M/UL (ref 3.8–5.2)
SODIUM SERPL-SCNC: 136 MMOL/L (ref 136–145)
WBC # BLD AUTO: 8 K/UL (ref 3.6–11)

## 2017-03-31 PROCEDURE — 36415 COLL VENOUS BLD VENIPUNCTURE: CPT | Performed by: PHYSICIAN ASSISTANT

## 2017-03-31 PROCEDURE — 74011000258 HC RX REV CODE- 258: Performed by: INTERNAL MEDICINE

## 2017-03-31 PROCEDURE — 77030020186 HC BOOT HL PROTCT SAGE -B

## 2017-03-31 PROCEDURE — 80048 BASIC METABOLIC PNL TOTAL CA: CPT | Performed by: PHYSICIAN ASSISTANT

## 2017-03-31 PROCEDURE — 74011250637 HC RX REV CODE- 250/637: Performed by: NURSE PRACTITIONER

## 2017-03-31 PROCEDURE — 74011250637 HC RX REV CODE- 250/637: Performed by: INTERNAL MEDICINE

## 2017-03-31 PROCEDURE — 65270000029 HC RM PRIVATE

## 2017-03-31 PROCEDURE — 74011250636 HC RX REV CODE- 250/636: Performed by: PHYSICIAN ASSISTANT

## 2017-03-31 PROCEDURE — 74011250637 HC RX REV CODE- 250/637: Performed by: PHYSICIAN ASSISTANT

## 2017-03-31 PROCEDURE — 97530 THERAPEUTIC ACTIVITIES: CPT

## 2017-03-31 PROCEDURE — 74011250636 HC RX REV CODE- 250/636: Performed by: INTERNAL MEDICINE

## 2017-03-31 PROCEDURE — 85027 COMPLETE CBC AUTOMATED: CPT | Performed by: PHYSICIAN ASSISTANT

## 2017-03-31 PROCEDURE — 74011250636 HC RX REV CODE- 250/636: Performed by: NURSE PRACTITIONER

## 2017-03-31 PROCEDURE — 97116 GAIT TRAINING THERAPY: CPT

## 2017-03-31 RX ORDER — ADHESIVE BANDAGE
30 BANDAGE TOPICAL DAILY PRN
Status: DISCONTINUED | OUTPATIENT
Start: 2017-03-31 | End: 2017-04-04 | Stop reason: HOSPADM

## 2017-03-31 RX ORDER — BACLOFEN 10 MG/1
10 TABLET ORAL 3 TIMES DAILY
Status: DISCONTINUED | OUTPATIENT
Start: 2017-03-31 | End: 2017-03-31

## 2017-03-31 RX ORDER — FENTANYL CITRATE 50 UG/ML
25 INJECTION, SOLUTION INTRAMUSCULAR; INTRAVENOUS
Status: DISCONTINUED | OUTPATIENT
Start: 2017-03-31 | End: 2017-04-04 | Stop reason: HOSPADM

## 2017-03-31 RX ORDER — POLYETHYLENE GLYCOL 3350 17 G/17G
17 POWDER, FOR SOLUTION ORAL ONCE
Status: COMPLETED | OUTPATIENT
Start: 2017-03-31 | End: 2017-03-31

## 2017-03-31 RX ORDER — OXYCODONE AND ACETAMINOPHEN 5; 325 MG/1; MG/1
1 TABLET ORAL
Status: COMPLETED | OUTPATIENT
Start: 2017-03-31 | End: 2017-03-31

## 2017-03-31 RX ORDER — BACLOFEN 10 MG/1
10 TABLET ORAL 3 TIMES DAILY
Status: DISCONTINUED | OUTPATIENT
Start: 2017-03-31 | End: 2017-04-04 | Stop reason: HOSPADM

## 2017-03-31 RX ORDER — DICLOFENAC SODIUM 25 MG/1
75 TABLET, DELAYED RELEASE ORAL 2 TIMES DAILY
Status: DISCONTINUED | OUTPATIENT
Start: 2017-03-31 | End: 2017-04-04 | Stop reason: HOSPADM

## 2017-03-31 RX ORDER — FACIAL-BODY WIPES
10 EACH TOPICAL
Status: COMPLETED | OUTPATIENT
Start: 2017-03-31 | End: 2017-03-31

## 2017-03-31 RX ADMIN — OXYCODONE HYDROCHLORIDE AND ACETAMINOPHEN 1 TABLET: 5; 325 TABLET ORAL at 11:22

## 2017-03-31 RX ADMIN — CIPROFLOXACIN HYDROCHLORIDE 500 MG: 500 TABLET, FILM COATED ORAL at 08:47

## 2017-03-31 RX ADMIN — DICLOFENAC SODIUM 50 MG: 25 TABLET, DELAYED RELEASE ORAL at 08:47

## 2017-03-31 RX ADMIN — HYDROMORPHONE HYDROCHLORIDE 1 MG: 1 INJECTION, SOLUTION INTRAMUSCULAR; INTRAVENOUS; SUBCUTANEOUS at 07:34

## 2017-03-31 RX ADMIN — BACLOFEN 10 MG: 10 TABLET ORAL at 22:57

## 2017-03-31 RX ADMIN — CALCIUM CARBONATE (ANTACID) CHEW TAB 500 MG 400 MG: 500 CHEW TAB at 17:57

## 2017-03-31 RX ADMIN — CALCIUM CARBONATE (ANTACID) CHEW TAB 500 MG 400 MG: 500 CHEW TAB at 13:25

## 2017-03-31 RX ADMIN — OXYCODONE HYDROCHLORIDE AND ACETAMINOPHEN 1 TABLET: 10; 325 TABLET ORAL at 21:22

## 2017-03-31 RX ADMIN — OXYCODONE HYDROCHLORIDE AND ACETAMINOPHEN 1 TABLET: 10; 325 TABLET ORAL at 15:50

## 2017-03-31 RX ADMIN — Medication 10 ML: at 22:58

## 2017-03-31 RX ADMIN — CIPROFLOXACIN HYDROCHLORIDE 500 MG: 500 TABLET, FILM COATED ORAL at 21:23

## 2017-03-31 RX ADMIN — DOCUSATE SODIUM -SENNOSIDES 1 TABLET: 50; 8.6 TABLET, COATED ORAL at 17:57

## 2017-03-31 RX ADMIN — Medication 10 ML: at 15:02

## 2017-03-31 RX ADMIN — BACLOFEN 10 MG: 10 TABLET ORAL at 17:57

## 2017-03-31 RX ADMIN — FENTANYL CITRATE 25 MCG: 50 INJECTION, SOLUTION INTRAMUSCULAR; INTRAVENOUS at 15:02

## 2017-03-31 RX ADMIN — MAGNESIUM HYDROXIDE 15 ML: 400 SUSPENSION ORAL at 11:26

## 2017-03-31 RX ADMIN — DICLOFENAC SODIUM 75 MG: 25 TABLET, DELAYED RELEASE ORAL at 17:57

## 2017-03-31 RX ADMIN — CEFEPIME 2 G: 2 INJECTION, POWDER, FOR SOLUTION INTRAVENOUS at 22:58

## 2017-03-31 RX ADMIN — DIAZEPAM 5 MG: 5 TABLET ORAL at 22:57

## 2017-03-31 RX ADMIN — DIAZEPAM 5 MG: 5 TABLET ORAL at 05:24

## 2017-03-31 RX ADMIN — CEFEPIME 2 G: 2 INJECTION, POWDER, FOR SOLUTION INTRAVENOUS at 06:21

## 2017-03-31 RX ADMIN — BACLOFEN 10 MG: 10 TABLET ORAL at 08:47

## 2017-03-31 RX ADMIN — CEFEPIME 2 G: 2 INJECTION, POWDER, FOR SOLUTION INTRAVENOUS at 15:03

## 2017-03-31 RX ADMIN — HYDROMORPHONE HYDROCHLORIDE 2 MG: 2 TABLET ORAL at 01:23

## 2017-03-31 RX ADMIN — POLYETHYLENE GLYCOL 3350 17 G: 17 POWDER, FOR SOLUTION ORAL at 23:00

## 2017-03-31 RX ADMIN — DIAZEPAM 5 MG: 5 TABLET ORAL at 13:25

## 2017-03-31 RX ADMIN — BISACODYL 10 MG: 10 SUPPOSITORY RECTAL at 13:30

## 2017-03-31 RX ADMIN — DOCUSATE SODIUM -SENNOSIDES 1 TABLET: 50; 8.6 TABLET, COATED ORAL at 08:47

## 2017-03-31 RX ADMIN — CALCIUM CARBONATE (ANTACID) CHEW TAB 500 MG 400 MG: 500 CHEW TAB at 08:48

## 2017-03-31 RX ADMIN — MAGNESIUM HYDROXIDE 15 ML: 400 SUSPENSION ORAL at 13:28

## 2017-03-31 RX ADMIN — OXYCODONE HYDROCHLORIDE AND ACETAMINOPHEN 1 TABLET: 5; 325 TABLET ORAL at 10:15

## 2017-03-31 RX ADMIN — OXYCODONE HYDROCHLORIDE AND ACETAMINOPHEN 1 TABLET: 10; 325 TABLET ORAL at 03:14

## 2017-03-31 RX ADMIN — FENTANYL CITRATE 25 MCG: 50 INJECTION, SOLUTION INTRAMUSCULAR; INTRAVENOUS at 19:20

## 2017-03-31 NOTE — WOUND CARE
Wound Care Consult:  Initial inpatient wound and skin consult. Patient is alert and oriented in room, family in room. Patient is requesting a low air loss bed for comfort, per Ortho RUPINDER Vega ok to get bed. Assessment:  All skin folds and bony prominences assessed. Patient has dry and intact dressing lower mid back, surgical wound. Right thigh with resolving blood blister, per patient and mother \" Its from tape\". Bilateral heels red, blanching. Sacral/buttock areas intact. Treatment:  Low air loss bed ordered for patient comfort. Prevalon boots applied to bilateral feet, education provided to patient about keeping heels off bed to prevent breakdown. Recommendations/Plan:    Will cont. To follow   Please re consult as needed.      Barber Lees RN

## 2017-03-31 NOTE — PROGRESS NOTES
Occupational Therapy Note:  Chart reviewed. Attempted to see pt x2 today. First time this am pt was in 10+/10 pain and unable to participate. Second attempt she was awaiting medication to then participate in PT. Will attempt to follow up with OT over the weekend with goal to be able to shower as able.   Albina Adonro, OTR/L, CBIS

## 2017-03-31 NOTE — PROGRESS NOTES
Pt complaining of 10/10 spasms d/t bed being too long. Pt requesting new bed and pain medication. Pt has already received available PRNs with no relief. Attempted to reach PA as requested per pt's mother, no response. Received phone call from Thais Saunders stating received call from mother requesting assistance. PA made aware of pt's c/o pain and prior adjustments to pain regimen, telephone verbal order provided for Dilaudid 1mg IV Q3H PRN for severe pain. 1388 - Called to pt's room for pain medication, upon entering room pt asleep with water pitcher in mouth difficult to arouse and drowsy when awake. PRN held at this time, pt education provided. Will continue to monitor. 0781 - Bedside shift change report given to Staci Kilgore RN (oncoming nurse) by Prudencio Clark RN (offgoing nurse). Report included the following information SBAR, Kardex, Procedure Summary, Intake/Output, MAR and Recent Results.

## 2017-03-31 NOTE — PROGRESS NOTES
Nutrition Assessment:    RECOMMENDATIONS/INTERVENTION(S):   Continue current diet  Continue Prosource protein powder TID to aid in meeting increased protein needs  Start Ensure High Protein BID - use as snacks/supplement to meal  Monitor po intakes, supplement tolerance, wt, skin integrity    ASSESSMENT:   3/31:  Follow-up. Noted lower back pain, spasms - continued IV pain meds. Noted constipation, last BM 3/23 - orders for milk of magnesia, pericolace. Visited pt this morning. Eating well but w/ feelings of abd distention. PO intakes ~75-80% per pt. RN records notate po intakes 45-60%. Discussed diet recs for constipation. Using Prosource protein powder TID. Agreeable to Ensure High Protein trial - use if po of meal <50% or as snacks. Palliative, ID, WOCN following.      3/27:  Pt seen for LOS. 33 yof admitted for post-operative infection. S/p recent L5-S1 microdiskectomy. Returns w/ new fluid collection/abscess - aspiration 3/22 & I&D 3/24. New abx through 5/4. Visited pt this afternoon.  at bedside. -115# - @ 100% of UBW. Fair to good appetite. Last BM 3/23. Provided diet recs for constipation. Agreeable to Prosource protein powder trial for increased protein needs. Labs/meds reviewed. Skin: post-op wound to lower back.       SUBJECTIVE/OBJECTIVE:     Diet Order: Regular, Prosource protein powder TID  % Eaten:    Patient Vitals for the past 72 hrs:   % Diet Eaten   03/29/17 1200 60 %   03/29/17 0748 45 %     Pertinent Medications: [x] Reviewed    Chemistries:  Lab Results   Component Value Date/Time    Sodium 136 03/31/2017 03:20 AM    Potassium 4.8 03/31/2017 03:20 AM    Chloride 100 03/31/2017 03:20 AM    CO2 28 03/31/2017 03:20 AM    Anion gap 8 03/31/2017 03:20 AM    Glucose 97 03/31/2017 03:20 AM    BUN 13 03/31/2017 03:20 AM    Creatinine 0.77 03/31/2017 03:20 AM    BUN/Creatinine ratio 17 03/31/2017 03:20 AM    GFR est AA >60 03/31/2017 03:20 AM    GFR est non-AA >60 03/31/2017 03:20 AM    Calcium 9.2 03/31/2017 03:20 AM    AST (SGOT) 21 03/21/2017 04:26 PM    Alk. phosphatase 112 03/21/2017 04:26 PM    Protein, total 8.0 03/21/2017 04:26 PM    Albumin 3.0 03/21/2017 04:26 PM    Globulin 5.0 03/21/2017 04:26 PM    A-G Ratio 0.6 03/21/2017 04:26 PM    ALT (SGPT) 40 03/21/2017 04:26 PM      Anthropometrics: Height: 5' 3\" (160 cm) Weight: 49.9 kg (110 lb 0.2 oz)    IBW (%IBW): 52.2 kg (115 lb) (95.66 %) UBW (%UBW): 49.9 kg (110 lb) (100.01 %)    BMI: Body mass index is 19.49 kg/(m^2). This BMI is indicative of:   [] Underweight    [x] Normal - @ risk of underwt    [] Overweight    []  Obesity    []  Extreme Obesity (BMI>40)  Estimated Nutrition Needs (Based on): 1537 Kcals/day (RMR (1183) x 1.3 AF) , 64 g (1.3 g/kg x actual body wt) Protein  Carbohydrate: At Least 130 g/day  Fluids: 1500 mL/day    Last BM: 3/23, constipation   [x]Active     []Hyperactive  []Hypoactive       [] Absent   BS  Skin:    [] Intact   [x] Incision  [] Breakdown   [] DTI   [] Tears/Excoriation/Abrasion  []Edema [] Other:    Wt Readings from Last 30 Encounters:   03/27/17 49.9 kg (110 lb 0.2 oz)   03/11/17 50.8 kg (112 lb)   03/05/17 49.4 kg (109 lb)   03/05/17 49.4 kg (109 lb)   03/05/17 49.4 kg (109 lb)   02/06/17 49.5 kg (109 lb 2 oz)      NUTRITION DIAGNOSES:   Problem:  Increased protein needs     Etiology: related to impaired skin integrity     Signs/Symptoms: as evidenced by post-op infected wound      NUTRITION INTERVENTIONS:  Meals/Snacks: General/healthful diet   Supplements: Commercial supplement     Initial/Brief Nutrition Education: Purpose of nutrition education        GOAL:   Pt will tolerate/consume >50% of meals & supplements in the next 3-5 days    Cultural, Alevism, or Ethnic Dietary Needs: None     LEARNING NEEDS (Diet, Food/Nutrient-Drug Interaction):    [] None Identified   [x] Identified and Education Provided/Documented   [] Identified and Pt declined/was not appropriate      [] Interdisciplinary Care Plan Reviewed/Documented    [] Discharge Needs:  TBD   [] No Nutrition Related Discharge Needs    NUTRITION RISK:   Pt Is At Nutrition Risk  [x]     No Nutrition Risk Identified  []       PT SEEN FOR:    []  MD Consult: []Calorie Count      []Diabetic Diet Education        []Diet Education     []Electrolyte Management     []General Nutrition Management and Supplements     []Management of Tube Feeding     []TPN Recommendations    []  RN Referral:  []MST score >=2     []Enteral/Parenteral Nutrition PTA     []Pregnant: Gestational DM or Multigestation                 [] Pressure Ulcer    []  Low BMI      [x]  Length of Stay       [] Dysphagia Diet         [] Ventilator            Evaline Fairly, [address] [place] [address]   Pager [phone]

## 2017-03-31 NOTE — PROGRESS NOTES
ORTHOPAEDIC SPINE SURGERY PROGRESS NOTE    NAME:     Lesia Harkins   :       1985   MRN:       [id number]   DATE:      3/31/2017    POD:    7 Days Post-Op  S/P:    Procedure(s):  SPINE INCISION AND DEBRIDEMENT LUMBAR    SUBJECTIVE:    C/O back pain and spasms  No leg pain or numbness  Denies nausea/vomiting, chest pain, headache or shortness of breath    IV Dilaudid every 3 hours prn re-ordered last night due to pain    Recent Labs      17   0320   HGB  10.5*   HCT  30.4*   NA  136   K  4.8   CL  100   CO2  28   BUN  13   CREA  0.77   GLU  97     Patient Vitals for the past 12 hrs:   BP Temp Pulse Resp SpO2   17 0520 102/65 98.3 °F (36.8 °C) 95 16 97 %   17 0107 108/67 97.1 °F (36.2 °C) 96 16 92 %   17 118/76 98.7 °F (37.1 °C) 96 17 99 %       EXAM:  Dressings clean, dry and intact   Positive strength/ROM bilat lower ext.   Neuro intact to sensation  Calves, soft & nontender  BL LEs NVID      PLAN:  Baclofen increased to 10 mg TID  PT/OT, OOB w/ assistance  Advance regular diet  Palliative Care and ID following patient      Lea Hartley [place]  Orthopaedic Surgery  Physician Assistant to Dr. America Garcia

## 2017-03-31 NOTE — CONSULTS
Palliative Medicine Consult  Arturo: [phone] ([phone])    Patient Name: Tony Palencia  YOB: 1985    Date of Initial Consult: 3/29/2017  Reason for Consult: Pain management  Requesting Provider: RUPINDER Jack   Primary Care Physician: Yakov Patel. Sharri Bonds MD      SUMMARY:   Tony Palencia is a 33 y.o. with a past history of HLD, back pain, patient had back surgery- L5-S1 microdiscectomy by Dr. Mat Deluca 2/6/17, recovered well until 3/5/17 when she developed significant back spasms and admitted for intractable pain, on 3/7/17 had a revision of discectomy and found to have a post op infection growing enterobacter, had PICC placed and 3/10/17 went home and received IV antibiotics. Then on 3/21/2017 she was admitted  from home  with a diagnosis of 7/10 throbbing back pain, 3/24 returned to OR for I&D still growing enterobactor. Day +7 s/p post op from I &D. Still having terrible back spasms and pain. Current medical issues leading to Palliative Medicine involvement include: pain management, opioid induced constipation, osteomyelitis of disc. PALLIATIVE DIAGNOSES:   1. Back pain  2. Back spasms  3. Debility- has been laying flat   4. Opioid induced constipation- no BM for 8 days       PLAN:   1. Met with clinical nurse lead- Mitcheal Spurling. She wanted to talk to me before seeing Ms. Mor Philip. She tells me that overnight patient set alarm (so she won't miss a dose)  and called for pain medicaition but when nurse came in to give pain medication patient was no responsive and therefore nurse held medication. She said pain and spasms were awful to the point her Mother called the orthopedic doctor on call and received order for Dilaudid IV. 2. In last 24 hours patient used: Dilaudid 2 mg po- 3 doses, Dilaudid 1 mg IV times 2 doses, percocet 10 mg X 6 doses and 5 mg X 1 dose with valium, bacolfen TID  and diclofenac BID. 3. Patient not moving much OOB, worked with PT times two yesterday.  Every time I visit her she is laying flat in bed. Encouraged her to move more. Even to sit on the side of the bed for 15 minute intervals. Rating pain 8/10. Not having spasms presently. 4. Plan: Stop dilaudid. Start Fentanyl 25 mcg IV every 4 hours prn pain. It is short acting and works rapidly- hopefully help with spasms. Baclofen increased to 10 mg TID and diclofenac increased to 75 mg. Instructed her to ask for valium when she is having back spasms. If pain does not go away ask for Fentanyl IV. Ask for percocet for break through pain. Continue with anti inflammatory and muscle relaxant. 5. Opioid induced constipation- has not had BM in 8 days. Abdomen distended. Good bowel sounds. Passing gas. Has been taking senna with colace. Ordered ducolax suppository and if no results after 4 hours then give enema. 6. Patient eating and drinking well. 7. Initial consult note routed to primary continuity provider  8.  Communicated plan of care with: Palliative IDT       GOALS OF CARE / TREATMENT PREFERENCES:   [====Goals of Care====]  GOALS OF CARE:  Patient / health care proxy stated goals: \"to control back pain\"      TREATMENT PREFERENCES:   Code Status: Full Code    Advance Care Planning:  Advance Care Planning 3/29/2017   Patient's Healthcare Decision Maker is: Legal Next of Mago Yusuf   Primary Decision Maker Name Willie De Leon   Primary Decision Maker Phone Number [phone]   Primary Decision Maker Relationship to Patient Spouse   Confirm Advance Directive None   Patient Would Like to Complete Advance Directive No       Other:    The palliative care team has discussed with patient / health care proxy about goals of care / treatment preferences for patient.  [====Goals of Care====]         HISTORY:     History obtained from: chart, patient and patient's     CHIEF COMPLAINT: Lower back pain with soreness in hips, and thighs, rating pain 8/10, back spasms    HPI/SUBJECTIVE:    The patient is:   [x] Verbal and participatory  [] Non-participatory due to:   Ms. [facility] is a 33year old who had a microdiscectomy- L5-S1- on 2/6/17. Admitted for spasms 3/5/17, 3/5/17- revision of discectomy and found to have infection, 3/10- went home with PICC and antibiotics, 3/21- readmitted for low back pain, 3/24- I &D of lumbar area    Clinical Pain Assessment (nonverbal scale for severity on nonverbal patients):   [++++ Clinical Pain Assessment++++]  [++++Pain Severity++++]: Pain: 7  [++++Pain Character++++]: spasms, sharp  [++++Pain Duration++++]: for at least 3 weeks  [++++Pain Effect++++]: have to lay flat  [++++Pain Factors++++]:   [++++Pain Frequency++++]: worse with movements  [++++Pain Location++++]: lower back  [++++ Clinical Pain Assessment++++]     FUNCTIONAL ASSESSMENT:     Palliative Performance Scale (PPS):  PPS: 90       PSYCHOSOCIAL/SPIRITUAL SCREENING:     Advance Care Planning:  Advance Care Planning 3/29/2017   Patient's Healthcare Decision Maker is: Legal Next Richa Yusuf   Primary Decision Maker Name Raisa Mock   Primary Decision Maker Phone Number [phone]   Primary Decision Maker Relationship to Patient Spouse   Confirm Advance Directive None   Patient Would Like to Complete Advance Directive No        Any spiritual / Scientologist concerns:  [] Yes /  [x] No    Caregiver Burnout:  [] Yes /  [x] No /  [] No Caregiver Present      Anticipatory grief assessment:   [x] Normal  / [] Maladaptive       ESAS Anxiety: Anxiety: 0    ESAS Depression: Depression: 0        REVIEW OF SYSTEMS:     Positive and pertinent negative findings in ROS are noted above in HPI. The following systems were [x] reviewed / [] unable to be reviewed as noted in HPI  Other findings are noted below. Systems: constitutional, ears/nose/mouth/throat, respiratory, gastrointestinal, genitourinary, musculoskeletal, integumentary, neurologic, psychiatric, endocrine. Positive findings noted below.   Modified ESAS Completed by: provider   Fatigue: 1 Drowsiness: 1   Depression: 0 Pain: 7   Anxiety: 0 Nausea: 0   Anorexia: 0 Dyspnea: 0     Constipation: Yes     Stool Occurrence(s): 1        PHYSICAL EXAM:     From RN flowsheet:  Wt Readings from Last 3 Encounters:   03/27/17 110 lb 0.2 oz (49.9 kg)   03/11/17 112 lb (50.8 kg)   03/05/17 109 lb (49.4 kg)     Blood pressure 111/83, pulse 82, temperature 97.6 °F (36.4 °C), resp. rate 16, height 5' 3\" (1.6 m), weight 110 lb 0.2 oz (49.9 kg), last menstrual period 03/03/2017, SpO2 95 %. Pain Scale 1: Numeric (0 - 10)  Pain Intensity 1: 8  Pain Onset 1: postop  Pain Location 1: Back  Pain Orientation 1: Right  Pain Description 1: Aching  Pain Intervention(s) 1: Medication (see MAR)  Last bowel movement, if known:     Constitutional: Young woman laying flat in bed in NAD  Eyes: pupils equal, anicteric  ENMT: no nasal discharge, moist mucous membranes  Cardiovascular: regular rhythm, distal pulses intact  Respiratory: breathing not labored, symmetric  Gastrointestinal: soft non-tender, +bowel sounds, distended  Musculoskeletal: no deformity, no tenderness to palpation  Skin: warm, dry  Neurologic: following commands, moving all extremities, less sleepy this afternoon  Psychiatric: full affect, no hallucinations  Other: Parents at bedside concerned we are not doing enough to help her pain       HISTORY:     Active Problems:    Post-operative infection (3/21/2017)      Past Medical History:   Diagnosis Date    Ill-defined condition     elevated cholesterol      Past Surgical History:   Procedure Laterality Date    HX ORTHOPAEDIC  02/2017    back surgery w/ Dr Abby Gaviria HX OTHER SURGICAL      Lasik eye surgery      History reviewed. No pertinent family history. History reviewed, no pertinent family history.   Social History   Substance Use Topics    Smoking status: Never Smoker    Smokeless tobacco: Never Used    Alcohol use 2.4 oz/week     2 Glasses of wine, 2 Cans of beer per week     No Known Allergies   Current Facility-Administered Medications   Medication Dose Route Frequency    baclofen (LIORESAL) tablet 10 mg  10 mg Oral TID    magnesium hydroxide (MILK OF MAGNESIA) 400 mg/5 mL oral suspension 30 mL  30 mL Oral DAILY PRN    fentaNYL citrate (PF) injection 25 mcg  25 mcg IntraVENous Q4H PRN    bisacodyl (DULCOLAX) suppository 10 mg  10 mg Rectal NOW    diazePAM (VALIUM) tablet 5 mg  5 mg Oral Q6H PRN    senna-docusate (PERICOLACE) 8.6-50 mg per tablet 1 Tab  1 Tab Oral BID    diclofenac EC (VOLTAREN) tablet 50 mg  50 mg Oral BID    calcium carbonate (TUMS) chewable tablet 400 mg [elemental]  400 mg Oral TID WITH MEALS    cefepime (MAXIPIME) 2 g in 0.9% sodium chloride (MBP/ADV) 100 mL  2 g IntraVENous Q8H    ciprofloxacin HCl (CIPRO) tablet 500 mg  500 mg Oral Q12H    alteplase (CATHFLO) injection 1 mg  1 mg InterCATHeter PRN    norethindrone-ethinyl estradiol (JUNEL FE 1/20) 1 mg-20 mcg (21)/75 mg (7) per tablet 1 Tab  1 Tab Oral DAILY    promethazine (PHENERGAN) tablet 25 mg  25 mg Oral Q6H PRN    0.9% sodium chloride infusion  100 mL/hr IntraVENous CONTINUOUS    oxyCODONE-acetaminophen (PERCOCET) 5-325 mg per tablet 1 Tab  1 Tab Oral Q4H PRN    oxyCODONE-acetaminophen (PERCOCET 10)  mg per tablet 1 Tab  1 Tab Oral Q4H PRN    sodium chloride (NS) flush 5-10 mL  5-10 mL IntraVENous Q8H    sodium chloride (NS) flush 5-10 mL  5-10 mL IntraVENous PRN    acetaminophen (TYLENOL) tablet 650 mg  650 mg Oral Q4H PRN    naloxone (NARCAN) injection 0.4 mg  0.4 mg IntraVENous PRN    diphenhydrAMINE (BENADRYL) injection 12.5 mg  12.5 mg IntraVENous Q4H PRN          LAB AND IMAGING FINDINGS:     Lab Results   Component Value Date/Time    WBC 8.0 03/31/2017 03:20 AM    HGB 10.5 03/31/2017 03:20 AM    PLATELET 213 16/65/1382 03:20 AM     Lab Results   Component Value Date/Time    Sodium 136 03/31/2017 03:20 AM    Potassium 4.8 03/31/2017 03:20 AM    Chloride 100 03/31/2017 03:20 AM    CO2 28 03/31/2017 03:20 AM    BUN 13 03/31/2017 03:20 AM    Creatinine 0.77 03/31/2017 03:20 AM    Calcium 9.2 03/31/2017 03:20 AM      Lab Results   Component Value Date/Time    AST (SGOT) 21 03/21/2017 04:26 PM    Alk. phosphatase 112 03/21/2017 04:26 PM    Protein, total 8.0 03/21/2017 04:26 PM    Albumin 3.0 03/21/2017 04:26 PM    Globulin 5.0 03/21/2017 04:26 PM     Lab Results   Component Value Date/Time    INR 1.1 03/09/2017 05:56 AM    Prothrombin time 11.0 03/09/2017 05:56 AM      No results found for: IRON, FE, TIBC, IBCT, PSAT, FERR   No results found for: PH, PCO2, PO2  No components found for: [place]   Lab Results   Component Value Date/Time    CK 25 03/29/2017 10:11 AM                Total time: 70 minutes  Counseling / coordination time: 50 minutes  > 50% counseling / coordination?: yes    Prolonged service was provided for  []30 min   []75 min in face to face time in the presence of the patient. Time Start:   Time End:   Note: this can only be billed with 47580 (initial) or 28554 (follow up). If multiple start / stop times, list each separately.

## 2017-03-31 NOTE — PROGRESS NOTES
Problem: Mobility Impaired (Adult and Pediatric)  Goal: *Acute Goals and Plan of Care (Insert Text)  Physical Therapy Goals  Initiated 3/26/2017    1. Patient will move from supine to sit and sit to supine , scoot up and down and roll side to side in bed with independence within 4 days. 2. Patient will perform sit to stand with independence within 4 days. 3. Patient will ambulate with independence for 200 feet with the least restrictive device within 4 days. 4. Patient will ascend/descend 4 stairs with handrail(s) with independence within 4 days. 5. Patient will verbalize and demonstrate understanding of spinal precautions (No bending, lifting greater than 5 lbs, or twisting; log-roll technique; frequent repositioning as instructed) within 4 days. PHYSICAL THERAPY TREATMENT  Patient: Nate Booth (23 y.o. female)  Date: 3/31/2017  Diagnosis: Post-operative infection  Status Post surgery infection <principal problem not specified>  Procedure(s) (LRB):  SPINE INCISION AND DEBRIDEMENT LUMBAR (N/A) 7 Days Post-Op  Precautions: Fall, Back (LSO brace when OOB; neutral spine)      ASSESSMENT:  Coordinate with RN for IV Fentanyl prior to session. Pt received in lying flat in bed, mother at bedside, agreeable to participate with physical therapy. Reports 7/10 pain primarily R side. Review of supine BLE hip ROM. Min A for rolling bed mobility<>sitting EOB. Total A for donning LSO. Sit<>stand using RW with min A. Gait training using RW x 75' with CGA. Demonstrated less guarded posture during gait. Pt request to sit on commode for possible BM, did not occur. Instructed patient not to strain for BM, as to not place increased pressure on back. No spasms during session. Patient requires increased time for mobility tasks.   Progression toward goals:  [ ]      Improving appropriately and progressing toward goals  [X]      Improving slowly and progressing toward goals  [ ]      Not making progress toward goals and plan of care will be adjusted       PLAN:  Patient continues to benefit from skilled intervention to address the above impairments. Continue treatment per established plan of care. Discharge Recommendations:  [address] pending progress  Further Equipment Recommendations for Discharge:  none       SUBJECTIVE:   Patient stated Chandni Thurman changed all my meds around today.    The patient stated 3/3 back precautions. Reviewed all 3 with patient. OBJECTIVE DATA SUMMARY:   Critical Behavior:  Neurologic State: Alert, Appropriate for age, Eyes open spontaneously  Orientation Level: Oriented X4, Appropriate for age  Cognition: Follows commands, Appropriate decision making, Appropriate for age attention/concentration, Appropriate safety awareness  Safety/Judgement: Awareness of environment, Fall prevention, Good awareness of safety precautions, Insight into deficits  Functional Mobility Training:  Bed Mobility:  Log Rolling: Minimum assistance  Supine to Sit: Minimum assistance; Additional time  Sit to Supine: Minimum assistance  Scooting: Minimum assistance        Brace donned with  total assistance   Transfers:  Sit to Stand: Minimum assistance  Stand to Sit: Minimum assistance                             Balance:  Sitting: Intact; With support  Standing - Static: Good;Constant support  Standing - Dynamic : Fair  Ambulation/Gait Training:  Distance (ft): 75 Feet (ft)  Assistive Device: Walker, rolling;Gait belt;Brace/Splint  Ambulation - Level of Assistance: Contact guard assistance        Gait Abnormalities: Decreased step clearance        Base of Support: Narrowed                                        Stairs: Therapeutic Exercises:      Pain:  Pain Scale 1: Numeric (0 - 10)  Pain Intensity 1: 7           Pain Intervention(s) 1: Medication (see MAR)  Activity Tolerance:   Good  Please refer to the flowsheet for vital signs taken during this treatment.   After treatment:   [ ]  Patient left in no apparent distress sitting up in chair  [X]  Patient left in no apparent distress in bed  [X]  Call bell left within reach  [X]  Nursing notified  [X]  Caregiver present  [ ]  Bed alarm activated      COMMUNICATION/COLLABORATION:   The patients plan of care was discussed with: Registered Nurse and Occupational Therapist     Hailee Dahl   Time Calculation: 49 mins

## 2017-03-31 NOTE — PROGRESS NOTES
3/31/2017 4:06 PM Planning for pt to discharge home with [facility] through [facility] and iv abx through [facility]. No weekend discharge needs identified. CM will follow.  CHRISS Rea

## 2017-04-01 LAB
ANION GAP BLD CALC-SCNC: 8 MMOL/L (ref 5–15)
BUN SERPL-MCNC: 19 MG/DL (ref 6–20)
BUN/CREAT SERPL: 23 (ref 12–20)
CALCIUM SERPL-MCNC: 9.1 MG/DL (ref 8.5–10.1)
CHLORIDE SERPL-SCNC: 100 MMOL/L (ref 97–108)
CO2 SERPL-SCNC: 28 MMOL/L (ref 21–32)
CREAT SERPL-MCNC: 0.81 MG/DL (ref 0.55–1.02)
ERYTHROCYTE [DISTWIDTH] IN BLOOD BY AUTOMATED COUNT: 12.4 % (ref 11.5–14.5)
GLUCOSE SERPL-MCNC: 83 MG/DL (ref 65–100)
HCT VFR BLD AUTO: 28.6 % (ref 35–47)
HGB BLD-MCNC: 9.5 G/DL (ref 11.5–16)
MCH RBC QN AUTO: 30.2 PG (ref 26–34)
MCHC RBC AUTO-ENTMCNC: 33.2 G/DL (ref 30–36.5)
MCV RBC AUTO: 90.8 FL (ref 80–99)
PLATELET # BLD AUTO: 333 K/UL (ref 150–400)
POTASSIUM SERPL-SCNC: 4.6 MMOL/L (ref 3.5–5.1)
RBC # BLD AUTO: 3.15 M/UL (ref 3.8–5.2)
SODIUM SERPL-SCNC: 136 MMOL/L (ref 136–145)
WBC # BLD AUTO: 9.4 K/UL (ref 3.6–11)

## 2017-04-01 PROCEDURE — 77030011256 HC DRSG MEPILEX <16IN NO BORD MOLN -A

## 2017-04-01 PROCEDURE — 74011250637 HC RX REV CODE- 250/637: Performed by: NURSE PRACTITIONER

## 2017-04-01 PROCEDURE — 74011000258 HC RX REV CODE- 258: Performed by: INTERNAL MEDICINE

## 2017-04-01 PROCEDURE — 74011250637 HC RX REV CODE- 250/637: Performed by: INTERNAL MEDICINE

## 2017-04-01 PROCEDURE — 74011250636 HC RX REV CODE- 250/636: Performed by: NURSE PRACTITIONER

## 2017-04-01 PROCEDURE — 74011250636 HC RX REV CODE- 250/636: Performed by: INTERNAL MEDICINE

## 2017-04-01 PROCEDURE — 36415 COLL VENOUS BLD VENIPUNCTURE: CPT | Performed by: PHYSICIAN ASSISTANT

## 2017-04-01 PROCEDURE — 74011250636 HC RX REV CODE- 250/636: Performed by: PHYSICIAN ASSISTANT

## 2017-04-01 PROCEDURE — 80048 BASIC METABOLIC PNL TOTAL CA: CPT | Performed by: PHYSICIAN ASSISTANT

## 2017-04-01 PROCEDURE — 65270000029 HC RM PRIVATE

## 2017-04-01 PROCEDURE — 97116 GAIT TRAINING THERAPY: CPT

## 2017-04-01 PROCEDURE — 97530 THERAPEUTIC ACTIVITIES: CPT

## 2017-04-01 PROCEDURE — 85027 COMPLETE CBC AUTOMATED: CPT | Performed by: PHYSICIAN ASSISTANT

## 2017-04-01 PROCEDURE — 74011250637 HC RX REV CODE- 250/637: Performed by: PHYSICIAN ASSISTANT

## 2017-04-01 RX ADMIN — CALCIUM CARBONATE (ANTACID) CHEW TAB 500 MG 400 MG: 500 CHEW TAB at 07:08

## 2017-04-01 RX ADMIN — DICLOFENAC SODIUM 75 MG: 25 TABLET, DELAYED RELEASE ORAL at 17:18

## 2017-04-01 RX ADMIN — DOCUSATE SODIUM -SENNOSIDES 1 TABLET: 50; 8.6 TABLET, COATED ORAL at 09:01

## 2017-04-01 RX ADMIN — DOCUSATE SODIUM -SENNOSIDES 1 TABLET: 50; 8.6 TABLET, COATED ORAL at 17:18

## 2017-04-01 RX ADMIN — CEFEPIME 2 G: 2 INJECTION, POWDER, FOR SOLUTION INTRAVENOUS at 22:35

## 2017-04-01 RX ADMIN — BACLOFEN 10 MG: 10 TABLET ORAL at 09:01

## 2017-04-01 RX ADMIN — Medication 10 ML: at 21:31

## 2017-04-01 RX ADMIN — CALCIUM CARBONATE (ANTACID) CHEW TAB 500 MG 400 MG: 500 CHEW TAB at 17:18

## 2017-04-01 RX ADMIN — Medication 10 ML: at 07:09

## 2017-04-01 RX ADMIN — CIPROFLOXACIN HYDROCHLORIDE 500 MG: 500 TABLET, FILM COATED ORAL at 21:31

## 2017-04-01 RX ADMIN — OXYCODONE HYDROCHLORIDE AND ACETAMINOPHEN 1 TABLET: 10; 325 TABLET ORAL at 17:19

## 2017-04-01 RX ADMIN — DICLOFENAC SODIUM 75 MG: 25 TABLET, DELAYED RELEASE ORAL at 09:01

## 2017-04-01 RX ADMIN — DIAZEPAM 5 MG: 5 TABLET ORAL at 22:36

## 2017-04-01 RX ADMIN — FENTANYL CITRATE 25 MCG: 50 INJECTION, SOLUTION INTRAMUSCULAR; INTRAVENOUS at 07:16

## 2017-04-01 RX ADMIN — BACLOFEN 10 MG: 10 TABLET ORAL at 17:18

## 2017-04-01 RX ADMIN — CIPROFLOXACIN HYDROCHLORIDE 500 MG: 500 TABLET, FILM COATED ORAL at 09:02

## 2017-04-01 RX ADMIN — OXYCODONE HYDROCHLORIDE AND ACETAMINOPHEN 1 TABLET: 10; 325 TABLET ORAL at 10:11

## 2017-04-01 RX ADMIN — CEFEPIME 2 G: 2 INJECTION, POWDER, FOR SOLUTION INTRAVENOUS at 15:28

## 2017-04-01 RX ADMIN — SODIUM CHLORIDE 100 ML/HR: 900 INJECTION, SOLUTION INTRAVENOUS at 07:59

## 2017-04-01 RX ADMIN — CEFEPIME 2 G: 2 INJECTION, POWDER, FOR SOLUTION INTRAVENOUS at 07:08

## 2017-04-01 RX ADMIN — CALCIUM CARBONATE (ANTACID) CHEW TAB 500 MG 400 MG: 500 CHEW TAB at 11:30

## 2017-04-01 RX ADMIN — BACLOFEN 10 MG: 10 TABLET ORAL at 22:36

## 2017-04-01 RX ADMIN — OXYCODONE HYDROCHLORIDE AND ACETAMINOPHEN 1 TABLET: 10; 325 TABLET ORAL at 21:31

## 2017-04-01 NOTE — PROGRESS NOTES
ORTHOPAEDIC SPINE SURGERY PROGRESS NOTE    NAME:     Serg Dorsey   :       1985   MRN:       [id number]   DATE:      2017    POD:    8 Days Post-Op  S/P:    Procedure(s):  SPINE INCISION AND DEBRIDEMENT LUMBAR    SUBJECTIVE:    C/O some back pain along surgical incision  No spasms  No leg pain or numbness  Denies nausea/vomiting, chest pain, headache or shortness of breath  Pain controlled    Patient improving and walking the young with PT upon entering the floor    Hemovac drain pulled by myself this morning. Mepilex dressing placed to drain site and over healing incision  Incision healing well. No erythema, warmth, swelling or drainage. Aquacel dressing was clean and dry prior to being changed    Recent Labs      17   0706   HGB  9.5*   HCT  28.6*   NA  136   K  4.6   CL  100   CO2  28   BUN  19   CREA  0.81   GLU  83     Patient Vitals for the past 12 hrs:   BP Temp Pulse Resp SpO2   17 0755 92/67 - - - -   17 0727 (!) 82/58 97.8 °F (36.6 °C) 79 16 98 %   17 0520 (!) 81/49 98.2 °F (36.8 °C) 84 18 97 %   17 2325 103/67 98 °F (36.7 °C) 99 18 100 %       EXAM:  Dressings clean, dry and intact   Positive strength/ROM bilat lower ext. Neuro intact to sensation  Calves, soft & nontender  BL LEs NVID      PLAN:  Continue IV abx  Continue pain medications as ordered  PT/OT, OOB w/ assistance  Advance regular diet  Palliative care and ID following patient  Will keep urinary catheter in place for one more day.  If patient continues to have increased mobility tomorrow, urinary catheter can be removed      Arnold Mendez AlaSoutheastern [facility] Services  Orthopaedic Surgery  Physician Assistant to Dr. Jason Underwood

## 2017-04-01 NOTE — PROGRESS NOTES
Bedside and Verbal shift change report given to Bina (oncoming nurse) by [facility] (offgoing nurse). Report included the following information SBAR, Kardex, Procedure Summary, Intake/Output, MAR and Recent Results.

## 2017-04-01 NOTE — PROGRESS NOTES
Bedside and Verbal shift change report given to Bina (oncoming nurse) by Lisa Abarca (offgoing nurse). Report included the following information SBAR, Procedure Summary, Intake/Output, MAR and Recent Results.

## 2017-04-01 NOTE — PROGRESS NOTES
Spiritual Care Assessment/Progress Notes    Kimberlee Aguilar [id number]  [id number]    1985  33 y.o.  female    Patient Telephone Number: [phone] (home)   Mandaen Affiliation: No preference   Language: English   Extended Emergency Contact Information  Primary Emergency Contact: Orlando Grubbs  Address: [address] [place] [address] Phone: [phone]  Mobile Phone: [phone]  Relation: Spouse   Patient Active Problem List    Diagnosis Date Noted    Post-operative infection 03/21/2017    Lumbar disc herniation 03/07/2017        Date: 4/1/2017       Level of Mandaen/Spiritual Activity:  []         Involved in trini tradition/spiritual practice    [x]         Not involved in trini tradition/spiritual practice  []         Spiritually oriented    []         Claims no spiritual orientation    []         seeking spiritual identity  []         Feels alienated from Mormon practice/tradition  []         Feels angry about Mormon practice/tradition  [x]         Spirituality/Mormon tradition does not seem to be a resource for coping at this time.   []         Not able to assess due to medical condition    Services Provided Today:  []         crisis intervention    []         reading Scriptures  [x]         spiritual assessment    []         prayer  [x]         empathic listening/emotional support  []         rites and rituals (cite in comments)  []         life review     []         Mormon support  []         theological development   []         advocacy  []         ethical dialog     []         blessing  []         bereavement support    [x]         support to family  []         anticipatory grief support   []         help with AMD  []         spiritual guidance    []         meditation      Spiritual Care Needs  [x]         Emotional Support  []         Spiritual/Mandaen Care  []         Loss/Adjustment  []         Advocacy/Referral /Ethics  []         No needs expressed at               this time  []         Other: (note in               comments)  Spiritual Care Plan  []         Follow up visits with               pt/family  []         Provide materials  []         Schedule sacraments  []         Contact Community               Clergy  [x]         Follow up as needed  []         Other: (note in               comments)     Comments: While doing rounds on the 4th floor visited patient with patient's  Zulema Gannon at bedside. Listened as patient lamented her protracted stay in hospital. Patient reports being well supported by her . She is not involved in any Amish or spirituality, however she reports respecting whatever trini one wishes to pursue. She has been involved in [employer] and thinks that her medical issues will block this in the future. Both she and her  are runners. She has ran half marathons and expects to return to running in the future. Chaplains will follow as needed.     Chaplain Rafael, MDiv, MS, [facility]  [phone]

## 2017-04-01 NOTE — PROGRESS NOTES
Problem: Mobility Impaired (Adult and Pediatric)  Goal: *Acute Goals and Plan of Care (Insert Text)  Physical Therapy Goals  Initiated 3/26/2017  Updated 4/1/2017, approaching all goals, good progress, goal #5 met. Continue with goals #1-4 for 7 more days. 1. Patient will move from supine to sit and sit to supine , scoot up and down and roll side to side in bed with independence within 4 days. 2. Patient will perform sit to stand with independence within 4 days. 3. Patient will ambulate with independence for 200 feet with the least restrictive device within 4 days. 4. Patient will ascend/descend 4 stairs with handrail(s) with independence within 4 days. 5. Patient will verbalize and demonstrate understanding of spinal precautions (No bending, lifting greater than 5 lbs, or twisting; log-roll technique; frequent repositioning as instructed) within 4 days. (met 4/1/2017)   PHYSICAL THERAPY TREATMENT: WEEKLY REASSESSMENT  Patient: Jose Interiano (13 y.o. female)  Date: 4/1/2017  Diagnosis: Post-operative infection  Status Post surgery infection <principal problem not specified>  Procedure(s) (LRB):  SPINE INCISION AND DEBRIDEMENT LUMBAR (N/A) 8 Days Post-Op  Precautions: Fall, Back (LSO brace when OOB; neutral spine)      ASSESSMENT:  Pt received in lying flat in bed, mother at bedside, agreeable to participate with physical therapy. States that she was just up to the bathroom with her mom's help. Review of supine BLE hip ROM, patient states she performed them this morning prior to getting up. Reviewed back precautions, patient recalls 3 out of 3. Reviewed log roooin for rolling bed mobility<>sitting EOB supervision. Mod assist for donning LSO. Demonstrated body mechanics for Sit<>stand using RW and supervision. Gait training using with and without the rolling walker. Improvements in gait quality and gait distance. Demonstrated less guarded posture during gait. No spasms during session.  Patient reports great improvement in pain and spasms since receiving new hospital bed. Patient's progression toward goals since last assessment: improving goals updated       PLAN:  Goals have been updated based on progression since last assessment. Patient continues to benefit from skilled intervention to address the above impairments. Continue to follow the patient daily to address goals. Planned Interventions:  [X]              Bed Mobility Training             [ ]       Neuromuscular Re-Education  [X]              Transfer Training                   [ ]       Orthotic/Prosthetic Training  [X]              Gait Training                         [ ]       Modalities  [X]              Therapeutic Exercises           [ ]       Edema Management/Control  [X]              Therapeutic Activities            [X]       Patient and Family Training/Education  [ ]              Other (comment):  Discharge Recommendations: Home Health  Further Equipment Recommendations for Discharge: tbd       SUBJECTIVE:   Patient stated I'm like a new person today.       OBJECTIVE DATA SUMMARY:   Critical Behavior:  Neurologic State: Alert, Appropriate for age  Orientation Level: Oriented X4  Cognition: Appropriate decision making, Appropriate for age attention/concentration, Appropriate safety awareness, Follows commands  Safety/Judgement: Awareness of environment, Fall prevention, Good awareness of safety precautions, Insight into deficits     Strength:   Strength: Generally decreased, functional                       Functional Mobility Training:  Bed Mobility:     Supine to Sit: Modified independent; Additional time;Assist x1  Sit to Supine: Modified independent; Additional time           Transfers:  Sit to Stand: Contact guard assistance;Assist x1;Additional time  Stand to Sit: Contact guard assistance; Additional time;Assist x1                             Balance:     Ambulation/Gait Training:  Distance (ft): 140 Feet (ft) (140 with walker, 80 feet no assist device)  Assistive Device: Walker, rolling;Brace/Splint;Gait belt                             Step Length: Right shortened;Left shortened                               Stairs:              Pain:  Pain Scale 1: Numeric (0 - 10)  Pain Intensity 1: 8  Pain Location 1: Back  Pain Orientation 1: Posterior; Lower     Pain Intervention(s) 1: Medication (see MAR)  Activity Tolerance:   Limited by pain and fatigue  Please refer to the flowsheet for vital signs taken during this treatment.   After treatment:   [ ]  Patient left in no apparent distress sitting up in chair  [X]  Patient left in no apparent distress in bed  [X]  Call bell left within reach  [X]  Nursing notified  [X]  Caregiver present  [ ]  Bed alarm activated      COMMUNICATION/COLLABORATION:   The patients plan of care was discussed with: Registered Nurse and her mom and      Amber Urbano, PT   Time Calculation: 28 mins

## 2017-04-02 LAB
ANION GAP BLD CALC-SCNC: 11 MMOL/L (ref 5–15)
BUN SERPL-MCNC: 17 MG/DL (ref 6–20)
BUN/CREAT SERPL: 24 (ref 12–20)
CALCIUM SERPL-MCNC: 8.7 MG/DL (ref 8.5–10.1)
CHLORIDE SERPL-SCNC: 107 MMOL/L (ref 97–108)
CO2 SERPL-SCNC: 24 MMOL/L (ref 21–32)
CREAT SERPL-MCNC: 0.7 MG/DL (ref 0.55–1.02)
ERYTHROCYTE [DISTWIDTH] IN BLOOD BY AUTOMATED COUNT: 12.2 % (ref 11.5–14.5)
GLUCOSE SERPL-MCNC: 88 MG/DL (ref 65–100)
HCT VFR BLD AUTO: 29 % (ref 35–47)
HGB BLD-MCNC: 9.8 G/DL (ref 11.5–16)
MCH RBC QN AUTO: 30.7 PG (ref 26–34)
MCHC RBC AUTO-ENTMCNC: 33.8 G/DL (ref 30–36.5)
MCV RBC AUTO: 90.9 FL (ref 80–99)
PLATELET # BLD AUTO: 344 K/UL (ref 150–400)
POTASSIUM SERPL-SCNC: 4.2 MMOL/L (ref 3.5–5.1)
RBC # BLD AUTO: 3.19 M/UL (ref 3.8–5.2)
SODIUM SERPL-SCNC: 142 MMOL/L (ref 136–145)
WBC # BLD AUTO: 5.7 K/UL (ref 3.6–11)

## 2017-04-02 PROCEDURE — 74011250636 HC RX REV CODE- 250/636: Performed by: INTERNAL MEDICINE

## 2017-04-02 PROCEDURE — 74011250637 HC RX REV CODE- 250/637: Performed by: NURSE PRACTITIONER

## 2017-04-02 PROCEDURE — 74011250636 HC RX REV CODE- 250/636: Performed by: NURSE PRACTITIONER

## 2017-04-02 PROCEDURE — 74011250637 HC RX REV CODE- 250/637: Performed by: PHYSICIAN ASSISTANT

## 2017-04-02 PROCEDURE — 80048 BASIC METABOLIC PNL TOTAL CA: CPT | Performed by: PHYSICIAN ASSISTANT

## 2017-04-02 PROCEDURE — 97535 SELF CARE MNGMENT TRAINING: CPT

## 2017-04-02 PROCEDURE — 74011250637 HC RX REV CODE- 250/637: Performed by: INTERNAL MEDICINE

## 2017-04-02 PROCEDURE — 65270000029 HC RM PRIVATE

## 2017-04-02 PROCEDURE — 74011000258 HC RX REV CODE- 258: Performed by: INTERNAL MEDICINE

## 2017-04-02 PROCEDURE — 85027 COMPLETE CBC AUTOMATED: CPT | Performed by: PHYSICIAN ASSISTANT

## 2017-04-02 PROCEDURE — 36415 COLL VENOUS BLD VENIPUNCTURE: CPT | Performed by: PHYSICIAN ASSISTANT

## 2017-04-02 RX ADMIN — CEFEPIME 2 G: 2 INJECTION, POWDER, FOR SOLUTION INTRAVENOUS at 06:19

## 2017-04-02 RX ADMIN — DOCUSATE SODIUM -SENNOSIDES 1 TABLET: 50; 8.6 TABLET, COATED ORAL at 09:00

## 2017-04-02 RX ADMIN — FENTANYL CITRATE 25 MCG: 50 INJECTION, SOLUTION INTRAMUSCULAR; INTRAVENOUS at 06:57

## 2017-04-02 RX ADMIN — CALCIUM CARBONATE (ANTACID) CHEW TAB 500 MG 400 MG: 500 CHEW TAB at 12:14

## 2017-04-02 RX ADMIN — OXYCODONE HYDROCHLORIDE AND ACETAMINOPHEN 1 TABLET: 10; 325 TABLET ORAL at 09:00

## 2017-04-02 RX ADMIN — CEFEPIME 2 G: 2 INJECTION, POWDER, FOR SOLUTION INTRAVENOUS at 14:15

## 2017-04-02 RX ADMIN — DICLOFENAC SODIUM 75 MG: 25 TABLET, DELAYED RELEASE ORAL at 09:00

## 2017-04-02 RX ADMIN — MAGNESIUM HYDROXIDE 30 ML: 400 SUSPENSION ORAL at 08:59

## 2017-04-02 RX ADMIN — CIPROFLOXACIN HYDROCHLORIDE 500 MG: 500 TABLET, FILM COATED ORAL at 22:04

## 2017-04-02 RX ADMIN — Medication 10 ML: at 06:19

## 2017-04-02 RX ADMIN — CALCIUM CARBONATE (ANTACID) CHEW TAB 500 MG 400 MG: 500 CHEW TAB at 09:00

## 2017-04-02 RX ADMIN — OXYCODONE HYDROCHLORIDE AND ACETAMINOPHEN 1 TABLET: 10; 325 TABLET ORAL at 18:41

## 2017-04-02 RX ADMIN — CEFEPIME 2 G: 2 INJECTION, POWDER, FOR SOLUTION INTRAVENOUS at 22:04

## 2017-04-02 RX ADMIN — Medication 10 ML: at 22:05

## 2017-04-02 RX ADMIN — DOCUSATE SODIUM -SENNOSIDES 1 TABLET: 50; 8.6 TABLET, COATED ORAL at 17:52

## 2017-04-02 RX ADMIN — BACLOFEN 10 MG: 10 TABLET ORAL at 17:52

## 2017-04-02 RX ADMIN — DIAZEPAM 5 MG: 5 TABLET ORAL at 22:04

## 2017-04-02 RX ADMIN — CIPROFLOXACIN HYDROCHLORIDE 500 MG: 500 TABLET, FILM COATED ORAL at 09:00

## 2017-04-02 RX ADMIN — DICLOFENAC SODIUM 75 MG: 25 TABLET, DELAYED RELEASE ORAL at 17:52

## 2017-04-02 RX ADMIN — CALCIUM CARBONATE (ANTACID) CHEW TAB 500 MG 400 MG: 500 CHEW TAB at 17:52

## 2017-04-02 RX ADMIN — NORETHINDRONE ACETATE AND ETHINYL ESTRADIOL 1 TABLET: KIT at 09:00

## 2017-04-02 RX ADMIN — OXYCODONE HYDROCHLORIDE AND ACETAMINOPHEN 1 TABLET: 10; 325 TABLET ORAL at 14:15

## 2017-04-02 RX ADMIN — BACLOFEN 10 MG: 10 TABLET ORAL at 09:06

## 2017-04-02 RX ADMIN — BACLOFEN 10 MG: 10 TABLET ORAL at 22:04

## 2017-04-02 NOTE — PROGRESS NOTES
Problem: Self Care Deficits Care Plan (Adult)  Goal: *Acute Goals and Plan of Care (Insert Text)  Occupational Therapy Goals  Initiated 3/28/2017    1. Patient will perform lower body dressing with supervision/set-up using adaptive equipment PRN within 7 days. 2. Patient will perform toileting and toilet transfer with supervision/set-up using most appropriate DME within 7 days. 3. Patient will perform grooming standing at sink for 5 minutes or more at supervision/set-up within 7 days. 4. Patient will don/doff back brace at modified [place] within 7 days. 5. Patient will verbalize/demonstrate 3/3 back precautions during ADL tasks without cues within 7 days. OCCUPATIONAL THERAPY TREATMENT  Patient: Sandra Nance (62 y.o. female)  Date: 4/2/2017  Diagnosis: Post-operative infection  Status Post surgery infection <principal problem not specified>  Procedure(s) (LRB):  SPINE INCISION AND DEBRIDEMENT LUMBAR (N/A) 9 Days Post-Op  Precautions: Fall, Back (LSO brace when OOB; neutral spine)      ASSESSMENT:  Nursing cleared for therapy and placed aqua seal on back incision. Patient alert and agreeable to shower. Patient's  placed wrapping over PICC line. Good understanding of back precautions and posture through out entire session. Completed ambulation to shower at RW level with close supervision. Provided education on transfer into shower, completed with supervision with use of grab bar. Provided shower safety education. Patienr preferring to stand instead of sitting. (Offer shower seat) Standing in shower over 15 mins, needing max A for bathing secondary to constant need for BUE support on grab bars. No LOB with constant 1-2 UE on grab bar or walker for support. Encourage patient to attempt as many aspects of bathing, drying, and donning gown as possible. She is in agreement and was pleased with her ability to shower.   She reports 6/10 pain with soreness possibly from additional mobility yesterday.  and mother present and very supportive. They report ability to assist with needs at home. Progression toward goals:  [X]       Improving appropriately and progressing toward goals  [ ]       Improving slowly and progressing toward goals  [ ]       Not making progress toward goals and plan of care will be adjusted       PLAN:  Patient continues to benefit from skilled intervention to address the above impairments. Continue treatment per established plan of care. Discharge Recommendations:  Home Health  Further Equipment Recommendations for Discharge:  None, has all needed equipment       SUBJECTIVE:   Patient stated I am feeling much better, I walked all over the place yesterday.       OBJECTIVE DATA SUMMARY:   Cognitive/Behavioral Status:  Neurologic State: Alert  Orientation Level: Oriented X4  Cognition: Appropriate decision making; Appropriate for age attention/concentration; Appropriate safety awareness              Functional Mobility and Transfers for ADLs:  Bed Mobility:  Supine to Sit: Modified independent  Sit to Supine: Modified independent  Scooting: Supervision     Transfers:  Sit to Stand: Supervision        Balance:  Sitting: Intact  Sitting - Static: Good (unsupported)  Sitting - Dynamic: Fair (occasional)  Standing: Intact; With support  Standing - Static: Good;Constant support  Standing - Dynamic : Fair     ADL Intervention:        Grooming  Washing Face: Supervision/set-up  Brushing/Combing Hair: Moderate assistance     Upper Body Bathing  Bathing Assistance:  Moderate assistance;Maximum assistance  Position Performed: Standing     Lower Body Bathing  Bathing Assistance: Maximum assistance  Position Performed: Standing  Lower Body : Maximum assistance  Position Performed: Standing     Upper Body [address] Gown: Supervision/ set-up     Pain:  Pain Scale 1: Numeric (0 - 10)  Pain Intensity 1: 5     Activity Tolerance:   Standing over 15 mins in shower with use of grab bars.       After treatment:   [ ] Patient left in no apparent distress sitting up in chair  [X] Patient left in no apparent distress in bed  [X] Call bell left within reach  [X] Nursing notified  [X] Caregiver present  [ ] Bed alarm activated      COMMUNICATION/COLLABORATION:   The patients plan of care was discussed with: Physical Therapist, Registered Nurse and Patient,  and mother     Tara Merida OT  Time Calculation: 40 mins

## 2017-04-02 NOTE — PROGRESS NOTES
ASSESSMENT     Agustina Grissom is a 33 y.o. female, who is POD# 9 s/p lumbar I&D    PLAN    1. Pain control  2. Mobilize with PT / OT. Ambulate daily. 3. DVT Prophylaxis : Mechanical  4. Cont IV antibiotics    Garima Shabazz MD  Office : [phone]  Cell: [phone]     Subjective:     Resting, pain much better. Prabhakar and drain out yesterday       Objective  Objective:   Patient Vitals for the past 12 hrs:   BP Temp Pulse Resp SpO2   04/02/17 0809 126/83 97.7 °F (36.5 °C) 79 18 99 %   04/02/17 0804 - 97.7 °F (36.5 °C) - - -   04/02/17 0621 115/74 98.4 °F (36.9 °C) 72 16 98 %       GENERAL: No acute distress. Alert and oriented. Well nourished and well hydrated. Appears stated age. HEENT : Normocephalic, atraumatic. Extraocular movements intact. Mucous membranes moist    NECK: Supple, trachea midline. LUNGS: Adequate and symmetric respiratory effort. No intercostal retractions or accessory muscle use. HEART: Extremities warm and perfused. ABDOMEN: Soft, nontender, nondistended, no guarding. SKIN: Warm and dry, no rash. MUSCULOSKELETAL EXAM  Lumbar dressing CDI. BLE examined. No focal motor or sensory deficts.     LABS :  Recent Labs      04/02/17   0617   HGB  9.8*   HCT  29.0*   NA  142   K  4.2   CL  107   CO2  24   BUN  17   CREA  0.70   GLU  88   WBC  5.7       No results found for: SDES    Lab Results   Component Value Date/Time    Culture result: FEW  ENTEROBACTER AEROGENES   03/24/2017 04:47 PM    Culture result: NO ANAEROBES ISOLATED 03/24/2017 04:47 PM    Culture result: NO GROWTH 4 DAYS 03/22/2017 01:05 PM    Culture result: NO GROWTH 4 DAYS 03/22/2017 01:05 PM    Culture result: NO GROWTH 5 DAYS 03/22/2017 01:05 PM

## 2017-04-02 NOTE — PROGRESS NOTES
Spoke to patient and her  early this morning. Breakfast has arrived, and they report that although her hips were tight this morning, she has already ambulated a lap on the floor unit. She prefers to proceed with shower per recommendations as discussed with OTR vs second priority PT treatment to conserve energy. They prefer orourke to be discontinued, so I assured them I would ask RN to get an order prior to OTR assessment. Encouraged continued mobility, and will resume PT/stairs on Monday as tolerated. Thank you.

## 2017-04-02 NOTE — PROGRESS NOTES
Bedside and Verbal shift change report given to Claudia Rock (oncoming nurse) by Chinyere Bhatt (offgoing nurse). Report included the following information SBAR, Intake/Output, MAR and Recent Results.

## 2017-04-03 LAB
ANION GAP BLD CALC-SCNC: 9 MMOL/L (ref 5–15)
BUN SERPL-MCNC: 14 MG/DL (ref 6–20)
BUN/CREAT SERPL: 20 (ref 12–20)
CALCIUM SERPL-MCNC: 8.7 MG/DL (ref 8.5–10.1)
CHLORIDE SERPL-SCNC: 105 MMOL/L (ref 97–108)
CO2 SERPL-SCNC: 26 MMOL/L (ref 21–32)
CREAT SERPL-MCNC: 0.7 MG/DL (ref 0.55–1.02)
ERYTHROCYTE [DISTWIDTH] IN BLOOD BY AUTOMATED COUNT: 12.3 % (ref 11.5–14.5)
GLUCOSE SERPL-MCNC: 86 MG/DL (ref 65–100)
HCT VFR BLD AUTO: 28.3 % (ref 35–47)
HGB BLD-MCNC: 9.9 G/DL (ref 11.5–16)
MCH RBC QN AUTO: 31.5 PG (ref 26–34)
MCHC RBC AUTO-ENTMCNC: 35 G/DL (ref 30–36.5)
MCV RBC AUTO: 90.1 FL (ref 80–99)
PLATELET # BLD AUTO: 413 K/UL (ref 150–400)
POTASSIUM SERPL-SCNC: 4.5 MMOL/L (ref 3.5–5.1)
RBC # BLD AUTO: 3.14 M/UL (ref 3.8–5.2)
SODIUM SERPL-SCNC: 140 MMOL/L (ref 136–145)
WBC # BLD AUTO: 6.6 K/UL (ref 3.6–11)

## 2017-04-03 PROCEDURE — 74011000258 HC RX REV CODE- 258: Performed by: INTERNAL MEDICINE

## 2017-04-03 PROCEDURE — 74011250637 HC RX REV CODE- 250/637: Performed by: INTERNAL MEDICINE

## 2017-04-03 PROCEDURE — 80048 BASIC METABOLIC PNL TOTAL CA: CPT | Performed by: PHYSICIAN ASSISTANT

## 2017-04-03 PROCEDURE — 74011250636 HC RX REV CODE- 250/636: Performed by: INTERNAL MEDICINE

## 2017-04-03 PROCEDURE — 85027 COMPLETE CBC AUTOMATED: CPT | Performed by: PHYSICIAN ASSISTANT

## 2017-04-03 PROCEDURE — 97535 SELF CARE MNGMENT TRAINING: CPT

## 2017-04-03 PROCEDURE — 36415 COLL VENOUS BLD VENIPUNCTURE: CPT | Performed by: PHYSICIAN ASSISTANT

## 2017-04-03 PROCEDURE — 97116 GAIT TRAINING THERAPY: CPT

## 2017-04-03 PROCEDURE — 97530 THERAPEUTIC ACTIVITIES: CPT

## 2017-04-03 PROCEDURE — 74011250637 HC RX REV CODE- 250/637: Performed by: PHYSICIAN ASSISTANT

## 2017-04-03 PROCEDURE — 65270000029 HC RM PRIVATE

## 2017-04-03 PROCEDURE — 74011250637 HC RX REV CODE- 250/637: Performed by: NURSE PRACTITIONER

## 2017-04-03 RX ADMIN — CIPROFLOXACIN HYDROCHLORIDE 500 MG: 500 TABLET, FILM COATED ORAL at 08:09

## 2017-04-03 RX ADMIN — BACLOFEN 10 MG: 10 TABLET ORAL at 17:25

## 2017-04-03 RX ADMIN — BACLOFEN 10 MG: 10 TABLET ORAL at 09:11

## 2017-04-03 RX ADMIN — BACLOFEN 10 MG: 10 TABLET ORAL at 22:58

## 2017-04-03 RX ADMIN — CEFEPIME 2 G: 2 INJECTION, POWDER, FOR SOLUTION INTRAVENOUS at 06:07

## 2017-04-03 RX ADMIN — CEFEPIME 2 G: 2 INJECTION, POWDER, FOR SOLUTION INTRAVENOUS at 15:47

## 2017-04-03 RX ADMIN — CEFEPIME 2 G: 2 INJECTION, POWDER, FOR SOLUTION INTRAVENOUS at 22:58

## 2017-04-03 RX ADMIN — OXYCODONE HYDROCHLORIDE AND ACETAMINOPHEN 1 TABLET: 5; 325 TABLET ORAL at 22:58

## 2017-04-03 RX ADMIN — CALCIUM CARBONATE (ANTACID) CHEW TAB 500 MG 400 MG: 500 CHEW TAB at 17:25

## 2017-04-03 RX ADMIN — OXYCODONE HYDROCHLORIDE AND ACETAMINOPHEN 1 TABLET: 5; 325 TABLET ORAL at 17:25

## 2017-04-03 RX ADMIN — Medication 10 ML: at 22:59

## 2017-04-03 RX ADMIN — DICLOFENAC SODIUM 75 MG: 25 TABLET, DELAYED RELEASE ORAL at 08:10

## 2017-04-03 RX ADMIN — CIPROFLOXACIN HYDROCHLORIDE 500 MG: 500 TABLET, FILM COATED ORAL at 22:58

## 2017-04-03 RX ADMIN — NORETHINDRONE ACETATE AND ETHINYL ESTRADIOL 1 TABLET: KIT at 09:00

## 2017-04-03 RX ADMIN — OXYCODONE HYDROCHLORIDE AND ACETAMINOPHEN 1 TABLET: 5; 325 TABLET ORAL at 13:40

## 2017-04-03 RX ADMIN — MAGNESIUM HYDROXIDE 30 ML: 400 SUSPENSION ORAL at 08:09

## 2017-04-03 RX ADMIN — DICLOFENAC SODIUM 75 MG: 25 TABLET, DELAYED RELEASE ORAL at 17:25

## 2017-04-03 RX ADMIN — Medication 10 ML: at 06:07

## 2017-04-03 RX ADMIN — CALCIUM CARBONATE (ANTACID) CHEW TAB 500 MG 400 MG: 500 CHEW TAB at 11:59

## 2017-04-03 RX ADMIN — DOCUSATE SODIUM -SENNOSIDES 1 TABLET: 50; 8.6 TABLET, COATED ORAL at 17:25

## 2017-04-03 RX ADMIN — CALCIUM CARBONATE (ANTACID) CHEW TAB 500 MG 400 MG: 500 CHEW TAB at 08:10

## 2017-04-03 RX ADMIN — OXYCODONE HYDROCHLORIDE AND ACETAMINOPHEN 1 TABLET: 5; 325 TABLET ORAL at 06:25

## 2017-04-03 RX ADMIN — DOCUSATE SODIUM -SENNOSIDES 1 TABLET: 50; 8.6 TABLET, COATED ORAL at 08:10

## 2017-04-03 NOTE — PROGRESS NOTES
Bedside and Verbal shift change report given to Harry Garcia (oncoming nurse) by Cierra Ramirez (offgoing nurse). Report included the following information SBAR, Procedure Summary, Intake/Output, MAR and Recent Results.

## 2017-04-03 NOTE — ADT AUTH CERT NOTES
Musculoskeletal Disease GRG - Care Day 14 (4/3/2017) by Chinedu Alatorre RN        Review Entered Review Status       4/3/2017 Completed       Details              Care Day: 14 Care Date: 4/3/2017 Level of Care: Inpatient Floor       Guideline Day 3        Level Of Care       (X) * Activity level acceptable       ( ) * Complete discharge planning              Clinical Status       (X) * Temperature status acceptable       4/3/2017 1:55 PM EDT by Caresse Goltz         afebrile              ( ) * No infection, or status acceptable       ( ) * C-reactive protein stable, declining, or not indicated       4/3/2017 1:55 PM EDT by Caresse Goltz         not performed              (X) * Respiratory status acceptable       (X) * Neurologic status acceptable       4/3/2017 1:55 PM EDT by Caresse Goltz         alert/oriented; BLE NVID              (X) * Vascular, soft tissue, and wound status acceptable       4/3/2017 1:55 PM EDT by Caresse Goltz         dsg c/d/i              (X) * Pain and nausea absent or adequately managed       4/3/2017 1:55 PM EDT by Caresse Goltz         Percocet x2 so far              (X) * Fracture or injury absent or status acceptable       4/3/2017 1:55 PM EDT by Caresse Goltz         no fx/injury              ( ) * No bone and joint infection, or status acceptable       (X) * Rheumatologic and vasculitic status acceptable       ( ) * General Discharge Criteria met              Interventions       (X) * Intake acceptable       ( ) * No inpatient interventions needed       4/3/2017 1:55 PM EDT by Caresse Goltz         IVF 100hr, Baclofen tid, TUMS tid, Cefepime IV q8, Cipro po q12, Voltaren bid                     4/3/2017 1:55 PM EDT by Caresse Goltz       Subject: Additional Clinical Information                                               * Milestone              Additional Notes       98.2-87-18, 110/68, 99% ra              H/H 9.9/28.3, [address]                     Fall Prec, Regular diet, Oximetry continuous, PT, SCD/Graded compression stockings              C/O some mild back pain along surgical incision       No leg pain or numbness       Denies nausea/vomiting, chest pain, headache or shortness of breath       Pain controlled       Prabhakar catheter has been removed, mobility is improved/improving              PLAN:       Continue pain medications as ordered       PT/OT, OOB       ID and Palliative Care also following patient              Ambulated 300' with RW/SBA              D/C plan is home with [facility] when medically stable           Musculoskeletal Disease GRG - Care Day 12 (4/1/2017) by Basilia Kennedy RN        Review Entered Review Status       4/3/2017 Completed       Details              Care Day: 12 Care Date: 4/1/2017 Level of Care: Inpatient Floor       Guideline Day 3        Level Of Care       (X) * Activity level acceptable       ( ) * Complete discharge planning              Clinical Status       (X) * Temperature status acceptable       4/3/2017 11:17 AM EDT by Sylvia Smallwood         afebrile              ( ) * No infection, or status acceptable       4/3/2017 11:17 AM EDT by Sylvia Smallwood         wound cx - Enterobacter Aerogens              ( ) * C-reactive protein stable, declining, or not indicated       4/3/2017 11:17 AM EDT by Sylvia Smallwood         not performed              (X) * Respiratory status acceptable       4/3/2017 11:17 AM EDT by Sylvia Smallwood         room air              (X) * Neurologic status acceptable       4/3/2017 11:17 AM EDT by Sylvia Smallwood         alert/oriented; BLE NVID              (X) * Vascular, soft tissue, and wound status acceptable       4/3/2017 11:17 AM EDT by Sylvia Smallwood         incision healing well - no erythema, warmth or drainage              ( ) * Pain and nausea absent or adequately managed       4/3/2017 11:17 AM EDT by Sylvia Smallwood         Fentanyl IV x1, Percocet x4              (X) * Fracture or injury absent or status acceptable       4/3/2017 11:17 AM EDT by Chaparro Craven         no fx/injury              ( ) * No bone and joint infection, or status acceptable       (X) * Rheumatologic and vasculitic status acceptable       ( ) * General Discharge Criteria met              Interventions       (X) * Intake acceptable       4/3/2017 11:17 AM EDT by Chaparro Craven         appetite fair-good              ( ) * No inpatient interventions needed       4/3/2017 11:17 AM EDT by Chaparro Craven         IVF 100hr, Baclofen tid, TUMS tid, Cefepime IV q8, Cipro po q12, Voltaren bid, Valium 5mg po x1                     4/3/2017 11:17 AM EDT by Chaparro Craven       Subject: Additional Clinical Information                                               * Milestone              Additional Notes       97.8-79-16, 82/58              Wbc 9.4, H/H 9.5/28. 6       Bun/Cr ratio 23              POD: 8 Days Post-Op       S/P:   Procedure(s):       SPINE INCISION AND DEBRIDEMENT LUMBAR                      C/O some back pain along surgical incision       No spasms       No leg pain or numbness       Denies nausea/vomiting, chest pain, headache or shortness of breath       Pain controlled               Patient improving and walking the young with PT upon entering the floor               Hemovac drain pulled by myself this morning. Mepilex dressing placed to drain site and over healing incision       Incision healing well. No erythema, warmth, swelling or drainage. Aquacel dressing was clean and dry prior to being changed               PLAN:       Continue IV abx       Continue pain medications as ordered       PT/OT, OOB w/ assistance       Palliative care and ID following patient       Will keep urinary catheter in place for one more day.  If patient continues to have increased mobility tomorrow, urinary catheter can be removed

## 2017-04-03 NOTE — PROGRESS NOTES
Problem: Self Care Deficits Care Plan (Adult)  Goal: *Acute Goals and Plan of Care (Insert Text)  Occupational Therapy Goals  Initiated 3/28/2017    1. Patient will perform lower body dressing with supervision/set-up using adaptive equipment PRN within 7 days. 2. Patient will perform toileting and toilet transfer with supervision/set-up using most appropriate DME within 7 days. 3. Patient will perform grooming standing at sink for 5 minutes or more at supervision/set-up within 7 days. 4. Patient will don/doff back brace at [facility] within 7 days. 5. Patient will verbalize/demonstrate 3/3 back precautions during ADL tasks without cues within 7 days. OCCUPATIONAL THERAPY TREATMENT  Patient: April Falk (2 y.o. female)  Date: 4/3/2017  Diagnosis: Post-operative infection  Status Post surgery infection <principal problem not specified>  Procedure(s) (LRB):  SPINE INCISION AND DEBRIDEMENT LUMBAR (N/A) 10 Days Post-Op  Precautions: Fall, Back (LSO brace when OOB; neutral spine)  Chart, occupational therapy assessment, plan of care, and goals were reviewed. ASSESSMENT:  Pt much improved this am. She was able to don slippers seated edge of bed using cross legged method with mod I. She verbalized she stood at the sink earlier and brushed her teeth. She needs stand by assist during ADL mobility and transfers to commode. She dons back brace independently. Pt verbalized back spasms have subsided. Pt has assist at home as needed. No further OT needed in acute care setting. .Recommend home health. Progression toward goals:  [X]          Improving appropriately and progressing toward goals  [ ]          Improving slowly and progressing toward goals  [ ]          Not making progress toward goals and plan of care will be adjusted       PLAN:  Patient continues to benefit from skilled intervention to address the above impairments. Continue treatment per established plan of care.   Discharge Recommendations:  Home health  Further Equipment Recommendations for Discharge:  Pt has DME       SUBJECTIVE:   Patient stated I am so much better.       OBJECTIVE DATA SUMMARY:   Cognitive/Behavioral Status:  Neurologic State: Alert  Orientation Level: Oriented X4  Cognition: Follows commands           Functional Mobility and Transfers for ADLs:              Bed Mobility:  Rolling: Stand-by asssistance  Supine to Sit: Stand-by asssistance  Sit to Supine: Stand-by asssistance                   Transfers:  Sit to Stand: Stand-by asssistance        Balance:  Sitting: Intact  Standing - Static: Good;Constant support  Standing - Dynamic : Fair  ADL Intervention:                                Lower Body Dressing Assistance  Slip on Shoes with Back: Modified independent  Leg Crossed Method Used: Yes  Position Performed: Seated edge of bed              Pain:  Pain Scale 1: Numeric (0 - 10)  Pain Intensity 1: 3  Pain Location 1: Arm  Pain Orientation 1: Posterior; Lower  Pain Description 1: Aching  Pain Intervention(s) 1: Medication (see MAR)     Activity Tolerance:    No signs/symptoms of distress or discomfort during OT  Please refer to the flowsheet for vital signs taken during this treatment.   After treatment:   [X]  Patient left in no apparent distress sitting up in chair  [ ]  Patient left in no apparent distress in bed  [X]  Call bell left within reach  [ ]  Nursing notified  [ ]  Caregiver present  [ ]  Bed alarm activated      COMMUNICATION/COLLABORATION:   The patients plan of care was discussed with: Physical Therapy Assistant and Occupational Therapist     NANDA Rowland  Time Calculation: 14 mins

## 2017-04-03 NOTE — PROGRESS NOTES
Problem: Mobility Impaired (Adult and Pediatric)  Goal: *Acute Goals and Plan of Care (Insert Text)  Physical Therapy Goals  Initiated 3/26/2017  Updated 4/1/2017, approaching all goals, good progress, goal #5 met. Continue with goals #1-4 for 7 more days. 1. Patient will move from supine to sit and sit to supine , scoot up and down and roll side to side in bed with independence within 4 days. 2. Patient will perform sit to stand with independence within 4 days. 3. Patient will ambulate with independence for 200 feet with the least restrictive device within 4 days. 4. Patient will ascend/descend 4 stairs with handrail(s) with independence within 4 days. 5. Patient will verbalize and demonstrate understanding of spinal precautions (No bending, lifting greater than 5 lbs, or twisting; log-roll technique; frequent repositioning as instructed) within 4 days. (met 4/1/2017)   PHYSICAL THERAPY TREATMENT  Patient: Tony Palencia (89 y.o. female)  Date: 4/3/2017  Diagnosis: Post-operative infection  Status Post surgery infection <principal problem not specified>  Procedure(s) (LRB):  SPINE INCISION AND DEBRIDEMENT LUMBAR (N/A) 10 Days Post-Op  Precautions: Fall, Back (LSO brace when OOB; neutral spine)      ASSESSMENT:  Pt received in bed,in good spirits, agreeable to participate with physical therapy. SBA for bed mobility>sitting EOB, maintaining back precautions. ABle to don LSO Mod I. Sit<>stand using RW with SBA. No complaints of increased pain with bed mobility. Gait training using RW for steadying x 300' with SBA. Much more relaxed  UE's with upright posture with fluid gait. Ascend/descend 10 steps using single handrail on L with CGA. Pt requested to return to bed, stating she will sit in chair later today. No complaints of increased back pain or spasms during session.    Progression toward goals:  [ ]      Improving appropriately and progressing toward goals  [X]      Improving slowly and progressing toward goals  [ ]      Not making progress toward goals and plan of care will be adjusted       PLAN:  Patient continues to benefit from skilled intervention to address the above impairments. Continue treatment per established plan of care. Discharge Recommendations:  Home Health  Further Equipment Recommendations for Discharge:  none       SUBJECTIVE:   Patient stated I have been doing so much better.    The patient stated 3/3 back precautions. Reviewed all 3 with patient. OBJECTIVE DATA SUMMARY:   Critical Behavior:  Neurologic State: Alert  Orientation Level: Oriented X4  Cognition: Follows commands  Safety/Judgement: Awareness of environment, Fall prevention, Good awareness of safety precautions, Insight into deficits  Functional Mobility Training:  Bed Mobility:  Log Rolling: Stand-by asssistance  Supine to Sit: Stand-by asssistance  Sit to Supine: Stand-by asssistance           Brace donned with  modified independence   Transfers:  Sit to Stand: Stand-by asssistance  Stand to Sit: Stand-by asssistance                             Balance:  Sitting: Intact  Standing - Static: Good;Constant support  Standing - Dynamic : Fair  Ambulation/Gait Training:  Distance (ft): 300 Feet (ft)  Assistive Device: Walker, rolling;Gait belt;Brace/Splint  Ambulation - Level of Assistance: Stand-by asssistance        Gait Abnormalities: Decreased step clearance        Base of Support: Narrowed     Speed/Dawn: Pace decreased (<100 feet/min)                                  Stairs:  Number of Stairs Trained: 10  Stairs - Level of Assistance: Contact guard assistance  Rail Use: Left   Therapeutic Exercises:      Pain:  Pain Scale 1: Numeric (0 - 10)  Pain Intensity 1: 3  Pain Location 1: Arm  Pain Orientation 1: Posterior; Lower  Pain Description 1: Aching  Pain Intervention(s) 1: Medication (see MAR)  Activity Tolerance:   Good  Please refer to the flowsheet for vital signs taken during this treatment.   After treatment:   [ ] Patient left in no apparent distress sitting up in chair  [X]  Patient left in no apparent distress in bed  [X]  Call bell left within reach  [X]  Nursing notified  [ ]  Caregiver present  [ ]  Bed alarm activated      COMMUNICATION/COLLABORATION:   The patients plan of care was discussed with: Registered Nurse     Lali Vogt PTA   Time Calculation: 25 mins

## 2017-04-03 NOTE — PROGRESS NOTES
ORTHOPAEDIC SPINE SURGERY PROGRESS NOTE    NAME:     April Falk   :       1985   MRN:       [id number]   DATE:      4/3/2017    POD:    10 Days Post-Op  S/P:    Procedure(s):  SPINE INCISION AND DEBRIDEMENT LUMBAR    SUBJECTIVE:    C/O some mild back pain along surgical incision  No leg pain or numbness  Denies nausea/vomiting, chest pain, headache or shortness of breath  Pain controlled  Prabhakar catheter has been removed, mobility is improved/improving    Recent Labs      17   0435   HGB  9.9*   HCT  28.3*   NA  140   K  4.5   CL  105   CO2  26   BUN  14   CREA  0.70   GLU  86     Patient Vitals for the past 12 hrs:   BP Temp Pulse Resp SpO2   17 0530 104/67 97.5 °F (36.4 °C) 69 18 99 %       EXAM:  Dressings clean, dry and intact   Positive strength/ROM bilat lower ext.   Neuro intact to sensation  Calves, soft & nontender  BL LEs NVID      PLAN:  Continue pain medications as ordered  PT/OT, OOB  Advance regular diet  ID and Palliative Care also following patient      Figueroa Mattson, [address]  Orthopaedic Surgery  Physician Assistant to Dr. Luis Enrique Atwood

## 2017-04-04 VITALS
SYSTOLIC BLOOD PRESSURE: 134 MMHG | OXYGEN SATURATION: 100 % | HEART RATE: 74 BPM | DIASTOLIC BLOOD PRESSURE: 83 MMHG | BODY MASS INDEX: 19.49 KG/M2 | WEIGHT: 110.01 LBS | RESPIRATION RATE: 18 BRPM | TEMPERATURE: 98.2 F | HEIGHT: 63 IN

## 2017-04-04 LAB
ANION GAP BLD CALC-SCNC: 9 MMOL/L (ref 5–15)
BUN SERPL-MCNC: 11 MG/DL (ref 6–20)
BUN/CREAT SERPL: 15 (ref 12–20)
CALCIUM SERPL-MCNC: 8.8 MG/DL (ref 8.5–10.1)
CHLORIDE SERPL-SCNC: 106 MMOL/L (ref 97–108)
CO2 SERPL-SCNC: 24 MMOL/L (ref 21–32)
CREAT SERPL-MCNC: 0.71 MG/DL (ref 0.55–1.02)
ERYTHROCYTE [DISTWIDTH] IN BLOOD BY AUTOMATED COUNT: 12.1 % (ref 11.5–14.5)
GLUCOSE SERPL-MCNC: 84 MG/DL (ref 65–100)
HCT VFR BLD AUTO: 28.5 % (ref 35–47)
HGB BLD-MCNC: 9.8 G/DL (ref 11.5–16)
MCH RBC QN AUTO: 30.6 PG (ref 26–34)
MCHC RBC AUTO-ENTMCNC: 34.4 G/DL (ref 30–36.5)
MCV RBC AUTO: 89.1 FL (ref 80–99)
PLATELET # BLD AUTO: 412 K/UL (ref 150–400)
POTASSIUM SERPL-SCNC: 4.2 MMOL/L (ref 3.5–5.1)
RBC # BLD AUTO: 3.2 M/UL (ref 3.8–5.2)
SODIUM SERPL-SCNC: 139 MMOL/L (ref 136–145)
WBC # BLD AUTO: 6.5 K/UL (ref 3.6–11)

## 2017-04-04 PROCEDURE — 74011250637 HC RX REV CODE- 250/637: Performed by: NURSE PRACTITIONER

## 2017-04-04 PROCEDURE — 74011000258 HC RX REV CODE- 258: Performed by: INTERNAL MEDICINE

## 2017-04-04 PROCEDURE — 85027 COMPLETE CBC AUTOMATED: CPT | Performed by: PHYSICIAN ASSISTANT

## 2017-04-04 PROCEDURE — 74011250637 HC RX REV CODE- 250/637: Performed by: INTERNAL MEDICINE

## 2017-04-04 PROCEDURE — 74011250637 HC RX REV CODE- 250/637: Performed by: PHYSICIAN ASSISTANT

## 2017-04-04 PROCEDURE — 80048 BASIC METABOLIC PNL TOTAL CA: CPT | Performed by: PHYSICIAN ASSISTANT

## 2017-04-04 PROCEDURE — 74011250636 HC RX REV CODE- 250/636: Performed by: INTERNAL MEDICINE

## 2017-04-04 PROCEDURE — 36415 COLL VENOUS BLD VENIPUNCTURE: CPT | Performed by: PHYSICIAN ASSISTANT

## 2017-04-04 RX ORDER — OXYCODONE AND ACETAMINOPHEN 5; 325 MG/1; MG/1
1-2 TABLET ORAL
Qty: 80 TAB | Refills: 0 | Status: SHIPPED | OUTPATIENT
Start: 2017-04-04

## 2017-04-04 RX ORDER — CIPROFLOXACIN 500 MG/1
500 TABLET ORAL EVERY 12 HOURS
Qty: 60 TAB | Refills: 0 | Status: SHIPPED | OUTPATIENT
Start: 2017-04-04 | End: 2017-05-04

## 2017-04-04 RX ORDER — DICLOFENAC SODIUM 75 MG/1
75 TABLET, DELAYED RELEASE ORAL 2 TIMES DAILY
Qty: 30 TAB | Refills: 0 | Status: SHIPPED | OUTPATIENT
Start: 2017-04-04

## 2017-04-04 RX ORDER — BACLOFEN 10 MG/1
10 TABLET ORAL 3 TIMES DAILY
Qty: 30 TAB | Refills: 0 | Status: SHIPPED | OUTPATIENT
Start: 2017-04-04

## 2017-04-04 RX ADMIN — CEFEPIME 2 G: 2 INJECTION, POWDER, FOR SOLUTION INTRAVENOUS at 14:33

## 2017-04-04 RX ADMIN — DOCUSATE SODIUM -SENNOSIDES 1 TABLET: 50; 8.6 TABLET, COATED ORAL at 09:05

## 2017-04-04 RX ADMIN — OXYCODONE HYDROCHLORIDE AND ACETAMINOPHEN 1 TABLET: 10; 325 TABLET ORAL at 13:46

## 2017-04-04 RX ADMIN — Medication 10 ML: at 06:34

## 2017-04-04 RX ADMIN — DICLOFENAC SODIUM 75 MG: 25 TABLET, DELAYED RELEASE ORAL at 09:05

## 2017-04-04 RX ADMIN — NORETHINDRONE ACETATE AND ETHINYL ESTRADIOL 1 TABLET: KIT at 09:07

## 2017-04-04 RX ADMIN — CIPROFLOXACIN HYDROCHLORIDE 500 MG: 500 TABLET, FILM COATED ORAL at 09:05

## 2017-04-04 RX ADMIN — OXYCODONE HYDROCHLORIDE AND ACETAMINOPHEN 1 TABLET: 5; 325 TABLET ORAL at 06:35

## 2017-04-04 RX ADMIN — BACLOFEN 10 MG: 10 TABLET ORAL at 16:31

## 2017-04-04 RX ADMIN — CEFEPIME 2 G: 2 INJECTION, POWDER, FOR SOLUTION INTRAVENOUS at 06:33

## 2017-04-04 RX ADMIN — DICLOFENAC SODIUM 75 MG: 25 TABLET, DELAYED RELEASE ORAL at 16:31

## 2017-04-04 RX ADMIN — BACLOFEN 10 MG: 10 TABLET ORAL at 10:44

## 2017-04-04 RX ADMIN — Medication 10 ML: at 14:34

## 2017-04-04 RX ADMIN — DOCUSATE SODIUM -SENNOSIDES 1 TABLET: 50; 8.6 TABLET, COATED ORAL at 16:30

## 2017-04-04 NOTE — PROGRESS NOTES
PHYSICAL THERAPY    1330 Pt received in bed, excited to report discharge from hospital today. Pt demonstrated ablility to don LSO Mod I,  safe technique for all functional transfers using RW with SBA and SBA for gait and stair training and  Pt was cleared for discharge from hospital from PT perspective 4/3/17. Renna Goodpasture Regena Moynahan

## 2017-04-04 NOTE — PROGRESS NOTES
Discussed case with RUPINDER Kim Mai who examined patient this morning and states she may be discharged home today if cleared by PT, ID and Palliative.      Andreina Griggs NP

## 2017-04-04 NOTE — PROGRESS NOTES
4/4/2017 2:41 PM Pt has been taught iv abx by [facility] RN. Pt's iv abx will be delivered this evening and [facility] is aware of discharge. 4/4/2017  11:33 AM [facility] and [facility] were notified of tentative discharge today. CM will follow up.  PRICE SierraW

## 2017-04-04 NOTE — PROGRESS NOTES
ORTHOPAEDIC SPINE SURGERY PROGRESS NOTE    NAME:     Ellen Patel   :       1985   MRN:       [id number]   DATE:      2017    POD:    11 Days Post-Op  S/P:    Procedure(s):  SPINE INCISION AND DEBRIDEMENT LUMBAR    SUBJECTIVE:    C/O improved back pain, no spasms  No leg pain or numbness  Denies nausea/vomiting, chest pain, headache or shortness of breath  Pain controlled    Recent Labs      17   0638   HGB  9.8*   HCT  28.5*   NA  139   K  4.2   CL  106   CO2  24   BUN  11   CREA  0.71   GLU  84     Patient Vitals for the past 12 hrs:   BP Temp Pulse Resp SpO2   17 0946 134/83 98.2 °F (36.8 °C) 74 18 100 %   17 0829 126/83 98.2 °F (36.8 °C) 69 18 98 %   17 0349 108/65 98.2 °F (36.8 °C) 72 18 98 %       EXAM:  Dressings clean, dry and intact   Positive strength/ROM bilat lower ext.   Neuro intact to sensation  Calves, soft & nontender  BL LEs NVID      PLAN:  Continue PO pain medications  PT/OT, OOB  Advance regular diet  Will likely D/C home today if cleared by PT, ID and [address], [place]  Orthopaedic Surgery  Physician Assistant to Dr. Skip Montoya

## 2017-04-04 NOTE — PROGRESS NOTES
Palliative Medicine Consult  Arturo: [phone] ([phone])    Patient Name: Remington rFeeman  YOB: 1985    Date of Initial Consult: 3/29/2017  Reason for Consult: Pain management  Requesting Provider: RUPINDER Abarca   Primary Care Physician: Brenden Cortes. Chaitanya Rodriguez MD      SUMMARY:   Remington Freeman is a 33 y.o. with a past history of HLD, back pain, patient had back surgery- L5-S1 microdiscectomy by Dr. Amari Mckeon 2/6/17, recovered well until 3/5/17 when she developed significant back spasms and admitted for intractable pain, on 3/7/17 had a revision of discectomy and found to have a post op infection growing enterobacter, had PICC placed and 3/10/17 went home and received IV antibiotics. Then on 3/21/2017 she was admitted  from home  with a diagnosis of 7/10 throbbing back pain, 3/24 returned to OR for I&D still growing enterobactor. Day +7 s/p post op from I &D. Still having terrible back spasms and pain. Current medical issues leading to Palliative Medicine involvement include: pain management, opioid induced constipation, osteomyelitis of disc. PALLIATIVE DIAGNOSES:   1. Back pain  2. Back spasms  3. Debility- has been laying flat   4. Opioid induced constipation- no BM for 8 days       PLAN:   1. Patient doing well overall. Has minimal spasm now. Planning on going home today. States baclofen been very helpful and actually has weaned herself down to the percocet 5 mg/325. Has used x5 in the last 24 hours. May need an extended time on opioids beyond the typical 14 days given her multiple recent surgeries, but it appears she already weaning herself. 2. Looking forward to going home. Has arrangements with help as far as care. 3. Opioid induced constipation- better overall. Continue regiment at home while on opioids to include plenty of fluids, miralax and senna  4. Patient eating and drinking well. 5. Initial consult note routed to primary continuity provider  6.  Communicated plan of care with: Palliative IDT       GOALS OF CARE / TREATMENT PREFERENCES:   [====Goals of Care====]  GOALS OF CARE:  Patient / health care proxy stated goals: \"to control back pain\"      TREATMENT PREFERENCES:   Code Status: Full Code    Advance Care Planning:  Advance Care Planning 3/29/2017   Patient's Healthcare Decision Maker is: Legal Next of Mago Yusuf   Primary Decision Maker Name Missy Lemus   Primary Decision Maker Phone Number [phone]   Primary Decision Maker Relationship to Patient Spouse   Confirm Advance Directive None   Patient Would Like to Complete Advance Directive No       Other:    The palliative care team has discussed with patient / health care proxy about goals of care / treatment preferences for patient.  [====Goals of Care====]         HISTORY:     History obtained from: chart, patient and patient's     CHIEF COMPLAINT: Lower back pain with soreness in hips, and thighs, rating pain 8/10, back spasms    HPI/SUBJECTIVE:    The patient is:   [x] Verbal and participatory  [] Non-participatory due to:   Patient smiling and comfortable.  Excited about going home  Clinical Pain Assessment (nonverbal scale for severity on nonverbal patients):   [++++ Clinical Pain Assessment++++]  [++++Pain Severity++++]: Pain: 4  [++++Pain Character++++]: spasms, sharp  [++++Pain Duration++++]: for at least 3 weeks  [++++Pain Effect++++]: have to lay flat  [++++Pain Factors++++]:   [++++Pain Frequency++++]: worse with movements  [++++Pain Location++++]: lower back  [++++ Clinical Pain Assessment++++]     FUNCTIONAL ASSESSMENT:     Palliative Performance Scale (PPS):  PPS: 90       PSYCHOSOCIAL/SPIRITUAL SCREENING:     Advance Care Planning:  Advance Care Planning 3/29/2017   Patient's Healthcare Decision Maker is: Legal Next of Mago Yusuf   Primary Decision Maker Name Missy Lemus   Primary Decision Maker Phone Number [phone]   Primary Decision Maker Relationship to Patient Spouse   Confirm Advance Directive None   Patient Would Like to Complete Advance Directive No        Any spiritual / Tenriism concerns:  [] Yes /  [x] No    Caregiver Burnout:  [] Yes /  [] No /  [x] No Caregiver Present      Anticipatory grief assessment:   [x] Normal  / [] Maladaptive       ESAS Anxiety: Anxiety: 0    ESAS Depression: Depression: 0        REVIEW OF SYSTEMS:     Positive and pertinent negative findings in ROS are noted above in HPI. The following systems were [x] reviewed / [] unable to be reviewed as noted in HPI  Other findings are noted below. Systems: constitutional, ears/nose/mouth/throat, respiratory, gastrointestinal, genitourinary, musculoskeletal, integumentary, neurologic, psychiatric, endocrine. Positive findings noted below. Modified ESAS Completed by: provider   Fatigue: 1 Drowsiness: 1   Depression: 0 Pain: 4   Anxiety: 0 Nausea: 0   Anorexia: 0 Dyspnea: 0     Constipation: Yes     Stool Occurrence(s): 1        PHYSICAL EXAM:     From RN flowsheet:  Wt Readings from Last 3 Encounters:   03/27/17 110 lb 0.2 oz (49.9 kg)   03/11/17 112 lb (50.8 kg)   03/05/17 109 lb (49.4 kg)     Blood pressure 134/83, pulse 74, temperature 98.2 °F (36.8 °C), resp. rate 18, height 5' 3\" (1.6 m), weight 110 lb 0.2 oz (49.9 kg), last menstrual period 03/03/2017, SpO2 100 %.     Pain Scale 1: Numeric (0 - 10)  Pain Intensity 1: 3  Pain Onset 1: post-op  Pain Location 1: Hip  Pain Orientation 1: Right, Left  Pain Description 1: Sore  Pain Intervention(s) 1: Medication (see MAR)  Last bowel movement, if known:     Constitutional: Young woman sitting up in bed and working on computer   Eyes: pupils equal, anicteric  ENMT: no nasal discharge, moist mucous membranes  Cardiovascular: regular rhythm, distal pulses intact  Respiratory: breathing not labored, symmetric  Gastrointestinal: soft non-tender, +bowel sounds, distended  Musculoskeletal: no deformity, no tenderness to palpation  Skin: warm, dry  Neurologic: following commands, moving all extremities,  Psychiatric: full affect, no hallucinations         HISTORY:     Active Problems:    Post-operative infection (3/21/2017)      Past Medical History:   Diagnosis Date    Ill-defined condition     elevated cholesterol      Past Surgical History:   Procedure Laterality Date    HX ORTHOPAEDIC  02/2017    back surgery w/ Dr Shameka Cardoza [address] [facility]      History reviewed. No pertinent family history. History reviewed, no pertinent family history.   Social History   Substance Use Topics    Smoking status: Never Smoker    Smokeless tobacco: Never Used    Alcohol use 2.4 oz/week     2 Glasses of wine, 2 Cans of beer per week     No Known Allergies   Current Facility-Administered Medications   Medication Dose Route Frequency    magnesium hydroxide (MILK OF MAGNESIA) 400 mg/5 mL oral suspension 30 mL  30 mL Oral DAILY PRN    fentaNYL citrate (PF) injection 25 mcg  25 mcg IntraVENous Q4H PRN    baclofen (LIORESAL) tablet 10 mg  10 mg Oral TID    diclofenac EC (VOLTAREN) tablet 75 mg  75 mg Oral BID    diazePAM (VALIUM) tablet 5 mg  5 mg Oral Q6H PRN    senna-docusate (PERICOLACE) 8.6-50 mg per tablet 1 Tab  1 Tab Oral BID    calcium carbonate (TUMS) chewable tablet 400 mg [elemental]  400 mg Oral TID WITH MEALS    cefepime (MAXIPIME) 2 g in 0.9% sodium chloride (MBP/ADV) 100 mL  2 g IntraVENous Q8H    ciprofloxacin HCl (CIPRO) tablet 500 mg  500 mg Oral Q12H    alteplase (CATHFLO) injection 1 mg  1 mg InterCATHeter PRN    norethindrone-ethinyl estradiol (JUNEL FE 1/20) 1 mg-20 mcg (21)/75 mg (7) per tablet 1 Tab  1 Tab Oral DAILY    promethazine (PHENERGAN) tablet 25 mg  25 mg Oral Q6H PRN    0.9% sodium chloride infusion  100 mL/hr IntraVENous CONTINUOUS    oxyCODONE-acetaminophen (PERCOCET) 5-325 mg per tablet 1 Tab  1 Tab Oral Q4H PRN    oxyCODONE-acetaminophen (PERCOCET 10)  mg per tablet 1 Tab  1 Tab Oral Q4H PRN    sodium chloride (NS) flush 5-10 mL  5-10 mL IntraVENous Q8H    sodium chloride (NS) flush 5-10 mL  5-10 mL IntraVENous PRN    acetaminophen (TYLENOL) tablet 650 mg  650 mg Oral Q4H PRN    naloxone (NARCAN) injection 0.4 mg  0.4 mg IntraVENous PRN    diphenhydrAMINE (BENADRYL) injection 12.5 mg  12.5 mg IntraVENous Q4H PRN          LAB AND IMAGING FINDINGS:     Lab Results   Component Value Date/Time    WBC 6.5 04/04/2017 06:38 AM    HGB 9.8 04/04/2017 06:38 AM    PLATELET 106 84/40/4138 06:38 AM     Lab Results   Component Value Date/Time    Sodium 139 04/04/2017 06:38 AM    Potassium 4.2 04/04/2017 06:38 AM    Chloride 106 04/04/2017 06:38 AM    CO2 24 04/04/2017 06:38 AM    BUN 11 04/04/2017 06:38 AM    Creatinine 0.71 04/04/2017 06:38 AM    Calcium 8.8 04/04/2017 06:38 AM      Lab Results   Component Value Date/Time    AST (SGOT) 21 03/21/2017 04:26 PM    Alk. phosphatase 112 03/21/2017 04:26 PM    Protein, total 8.0 03/21/2017 04:26 PM    Albumin 3.0 03/21/2017 04:26 PM    Globulin 5.0 03/21/2017 04:26 PM     Lab Results   Component Value Date/Time    INR 1.1 03/09/2017 05:56 AM    Prothrombin time 11.0 03/09/2017 05:56 AM      No results found for: IRON, FE, TIBC, IBCT, PSAT, FERR   No results found for: PH, PCO2, PO2  No components found for: [place]   Lab Results   Component Value Date/Time    CK 25 03/29/2017 10:11 AM                Total time: 25 minutes  Counseling / coordination time:    > 50% counseling / coordination?:     Prolonged service was provided for  []30 min   []75 min in face to face time in the presence of the patient. Time Start:   Time End:   Note: this can only be billed with 66526 (initial) or 19946 (follow up). If multiple start / stop times, list each separately.

## 2017-04-05 ENCOUNTER — HOME CARE VISIT (OUTPATIENT)
Dept: SCHEDULING | Facility: HOME HEALTH | Age: 32
End: 2017-04-05
Payer: COMMERCIAL

## 2017-04-05 VITALS
OXYGEN SATURATION: 96 % | HEART RATE: 94 BPM | SYSTOLIC BLOOD PRESSURE: 122 MMHG | DIASTOLIC BLOOD PRESSURE: 82 MMHG | TEMPERATURE: 98.2 F | RESPIRATION RATE: 18 BRPM

## 2017-04-05 PROCEDURE — G0299 HHS/HOSPICE OF RN EA 15 MIN: HCPCS

## 2017-04-05 PROCEDURE — G0151 HHCP-SERV OF PT,EA 15 MIN: HCPCS

## 2017-04-05 NOTE — PROGRESS NOTES
Patient discharged home via wheelchair, went over discharge instructions with patient and prescriptions given. Follow-up care  with physician, pt to make appointment. Patient understood instructions and patient in no apparent distress.

## 2017-04-06 VITALS
WEIGHT: 107 LBS | DIASTOLIC BLOOD PRESSURE: 82 MMHG | SYSTOLIC BLOOD PRESSURE: 118 MMHG | TEMPERATURE: 100 F | BODY MASS INDEX: 18.27 KG/M2 | RESPIRATION RATE: 18 BRPM | HEART RATE: 72 BPM | OXYGEN SATURATION: 98 % | HEIGHT: 64 IN

## 2017-04-07 ENCOUNTER — HOME CARE VISIT (OUTPATIENT)
Dept: SCHEDULING | Facility: HOME HEALTH | Age: 32
End: 2017-04-07
Payer: COMMERCIAL

## 2017-04-07 PROCEDURE — G0151 HHCP-SERV OF PT,EA 15 MIN: HCPCS

## 2017-04-10 LAB
BACTERIA SPEC CULT: NORMAL
SERVICE CMNT-IMP: NORMAL

## 2017-04-11 ENCOUNTER — HOME CARE VISIT (OUTPATIENT)
Dept: SCHEDULING | Facility: HOME HEALTH | Age: 32
End: 2017-04-11
Payer: COMMERCIAL

## 2017-04-11 VITALS
RESPIRATION RATE: 18 BRPM | DIASTOLIC BLOOD PRESSURE: 78 MMHG | TEMPERATURE: 98.4 F | HEART RATE: 94 BPM | SYSTOLIC BLOOD PRESSURE: 112 MMHG | OXYGEN SATURATION: 93 %

## 2017-04-11 PROCEDURE — G0151 HHCP-SERV OF PT,EA 15 MIN: HCPCS

## 2017-04-11 NOTE — DISCHARGE SUMMARY
DISCHARGE SUMMARY     Patient: Greg Robles                             Medical Record Number: [id number]                : 1985  Age: 33 y.o. Admit Date: 3/21/2017  Discharge Date: 2017  Admission Diagnosis: Post-operative infection  Status Post surgery infection  Discharge Diagnosis: Status Post surgery infection  Procedures: Procedure(s):  M[address]le 116 LUMBAR  Surgeon: Geoffrey Byrd MD  Co-surgeon:   Assistants:   Anesthesia: General  Complications: None     History of Present Illness:  Greg Robles is a 33 y.o. female with a history of undergoing a Right sided L5-S1 microdisectomy  on 17. She developed a post-operative infection. She was admitted to [facility] on 3/5/17. He had a Revision Right sided L5-S1/Incision and Drainage performed on 3/7/17 after her new MRI showed a recurrent disc herniation and her lumbar aspiration cultures were + for gram negative rods. Infectious disease was consulted. Her cultures her + for enterobacter aerogenes. She improved with IV antibiotics during her admission. She had a PICC line placed during admission. She was discharged home on 3/12/17. Greg Robles was re-admitted to [facility] on 3/21/17 due to progressively worsening back spasms. She did not improve with an aspiration performed by IR on 3/22/17. Her aspiration cultures her unremarkable. Due to her progressively worsening pain and spasms, she was taken back to the OR for a second Incision and Drainage on 3/24/17.      Hospital Course: Greg Robles tolerated the procedure well. She was transferred to the recovery room in stable condition. After a brief stay, the patient was then transferred to the Orthopedic floor. On postoperative day #1, the dressing was clean and dry and she was neurovascularly intact. The patient was afebrile and vital signs were stable. Calves were soft and non-tender bilaterally. The OR deep spine cultures were positive for few enterobacter aerogenes.  During admission, patient was evaluated by Infectious Disease who recommended treatment with dual therapy antibiotics. Patient already had a PICC line in place. Palliative care was also consulted during her admission for pain control. She gradually improved with treatment and physical therapy. Lizzie Kong made excellent progress during her admission. She was discharged Home with Home Health in stable condition on postoperative day 11. She was provided with routine postoperative instructions and advised to follow up in my office in 3 weeks following discharge from the hospital. She was given prescriptions for medication to control post-operative symptoms. Lizzie Kong will be followed by our office as well as Infectious Disease during her outpatient treatment. Discharge Medications:  Discharge Medication List as of 4/4/2017  3:08 PM      START taking these medications    Details   cefepime 2 gram 2 g, ADDaptor 1 Device IVPB 2 g by IntraVENous route every eight (8) hours for 30 days. , No Print, Disp-1 Dose, R-0      ciprofloxacin HCl (CIPRO) 500 mg tablet Take 1 Tab by mouth every twelve (12) hours for 30 days. , Print, Disp-60 Tab, R-0      oxyCODONE-acetaminophen (PERCOCET) 5-325 mg per tablet Take 1-2 Tabs by mouth every four (4) hours as needed. Max Daily Amount: 12 Tabs., Print, Disp-80 Tab, R-0      baclofen (LIORESAL) 10 mg tablet Take 1 Tab by mouth three (3) times daily. , Print, Disp-30 Tab, R-0      diclofenac EC (VOLTAREN) 75 mg EC tablet Take 1 Tab by mouth two (2) times a day., Print, Disp-30 Tab, R-0         CONTINUE these medications which have NOT CHANGED    Details   senna (SENNA) 8.6 mg tablet Take 2 Tabs by mouth two (2) times a day., Historical Med      sodium chloride (NORMAL SALINE FLUSH) 0.9 % 5-10 mL by IntraVENous route daily. , Historical Med      promethazine (PHENERGAN) 25 mg tablet Take 1 Tab by mouth every six (6) hours as needed for Nausea., No Print, Disp-60 Tab, R-2      docusate sodium (COLACE) 100 mg capsule Take 1 Cap by mouth two (2) times a day., No Print, Disp-60 Cap, R-2      multivitamin (ONE A DAY) tablet Take 1 Tab by mouth daily. , Historical Med      NORETHINDRONE-E.ESTRADIOL-IRON (JUNEL FE 1/20, 28, PO) Take 1 Tab by mouth daily. , Historical Med         STOP taking these medications       diazePAM (VALIUM) 5 mg tablet Comments:   Reason for Stopping:         HYDROmorphone (DILAUDID) 2 mg tablet Comments:   Reason for Stopping:         ertapenem (INVANZ) 1 gram solr injection Comments:   Reason for Stopping:         HEPARIN SOD,PORCINE/0.9 % NACL (HEPARIN FLUSH IV) Comments:   Reason for Stopping:         cyclobenzaprine (FLEXERIL) 10 mg tablet Comments:   Reason for Stopping:               RUPINDER Pichardo  4/4/2017  Orthopaedic Surgery  Physician Assistant to Dr. Kathy Angulo

## 2017-04-12 ENCOUNTER — HOME CARE VISIT (OUTPATIENT)
Dept: SCHEDULING | Facility: HOME HEALTH | Age: 32
End: 2017-04-12
Payer: COMMERCIAL

## 2017-04-12 PROCEDURE — G0300 HHS/HOSPICE OF LPN EA 15 MIN: HCPCS

## 2017-04-13 ENCOUNTER — HOME CARE VISIT (OUTPATIENT)
Dept: SCHEDULING | Facility: HOME HEALTH | Age: 32
End: 2017-04-13
Payer: COMMERCIAL

## 2017-04-13 PROCEDURE — G0151 HHCP-SERV OF PT,EA 15 MIN: HCPCS

## 2017-04-14 VITALS
RESPIRATION RATE: 22 BRPM | OXYGEN SATURATION: 98 % | SYSTOLIC BLOOD PRESSURE: 102 MMHG | DIASTOLIC BLOOD PRESSURE: 68 MMHG | TEMPERATURE: 98.9 F | HEART RATE: 115 BPM

## 2017-04-16 VITALS
TEMPERATURE: 98.3 F | HEART RATE: 87 BPM | DIASTOLIC BLOOD PRESSURE: 64 MMHG | OXYGEN SATURATION: 98 % | RESPIRATION RATE: 20 BRPM | SYSTOLIC BLOOD PRESSURE: 104 MMHG

## 2017-04-17 PROCEDURE — A6254 ABSORPT DRG <=16 SQ IN W/BDR: HCPCS

## 2017-04-17 PROCEDURE — A4456 ADHESIVE REMOVER, WIPES: HCPCS

## 2017-04-20 ENCOUNTER — HOME CARE VISIT (OUTPATIENT)
Dept: SCHEDULING | Facility: HOME HEALTH | Age: 32
End: 2017-04-20
Payer: COMMERCIAL

## 2017-04-20 PROCEDURE — G0300 HHS/HOSPICE OF LPN EA 15 MIN: HCPCS

## 2017-04-22 VITALS
RESPIRATION RATE: 20 BRPM | TEMPERATURE: 97 F | OXYGEN SATURATION: 98 % | DIASTOLIC BLOOD PRESSURE: 60 MMHG | SYSTOLIC BLOOD PRESSURE: 110 MMHG | HEART RATE: 81 BPM

## 2017-04-24 LAB
BACTERIA SPEC CULT: NORMAL
SERVICE CMNT-IMP: NORMAL

## 2017-04-27 ENCOUNTER — HOME CARE VISIT (OUTPATIENT)
Dept: HOME HEALTH SERVICES | Facility: HOME HEALTH | Age: 32
End: 2017-04-27
Payer: COMMERCIAL

## 2017-04-27 ENCOUNTER — HOME CARE VISIT (OUTPATIENT)
Dept: SCHEDULING | Facility: HOME HEALTH | Age: 32
End: 2017-04-27
Payer: COMMERCIAL

## 2017-04-27 PROCEDURE — G0300 HHS/HOSPICE OF LPN EA 15 MIN: HCPCS

## 2017-04-30 VITALS
DIASTOLIC BLOOD PRESSURE: 80 MMHG | TEMPERATURE: 98 F | HEART RATE: 78 BPM | OXYGEN SATURATION: 98 % | RESPIRATION RATE: 20 BRPM | SYSTOLIC BLOOD PRESSURE: 104 MMHG

## 2017-05-05 ENCOUNTER — HOME CARE VISIT (OUTPATIENT)
Dept: SCHEDULING | Facility: HOME HEALTH | Age: 32
End: 2017-05-05
Payer: COMMERCIAL

## 2017-05-05 VITALS
RESPIRATION RATE: 16 BRPM | TEMPERATURE: 98.5 F | SYSTOLIC BLOOD PRESSURE: 114 MMHG | OXYGEN SATURATION: 97 % | HEART RATE: 79 BPM | DIASTOLIC BLOOD PRESSURE: 78 MMHG

## 2017-05-05 PROCEDURE — G0299 HHS/HOSPICE OF RN EA 15 MIN: HCPCS

## 2017-10-30 ENCOUNTER — HOSPITAL ENCOUNTER (OUTPATIENT)
Dept: GENERAL RADIOLOGY | Age: 32
Discharge: HOME | End: 2017-10-30
Attending: ORTHOPAEDIC SURGERY
Payer: COMMERCIAL

## 2017-10-30 ENCOUNTER — HOSPITAL ENCOUNTER (OUTPATIENT)
Dept: MRI IMAGING | Age: 32
Discharge: HOME | End: 2017-10-30
Attending: ORTHOPAEDIC SURGERY
Payer: COMMERCIAL

## 2017-10-30 DIAGNOSIS — S43.431A SUPERIOR GLENOID LABRUM LESION OF RIGHT SHOULDER: ICD-10-CM

## 2017-10-30 PROCEDURE — 73222 MRI JOINT UPR EXTREM W/DYE: CPT

## 2017-10-30 PROCEDURE — 74011636320 HC RX REV CODE- 636/320: Performed by: RADIOLOGY

## 2017-10-30 PROCEDURE — 74011250636 HC RX REV CODE- 250/636: Performed by: RADIOLOGY

## 2017-10-30 PROCEDURE — 74011000250 HC RX REV CODE- 250: Performed by: RADIOLOGY

## 2017-10-30 PROCEDURE — 23350 INJECTION FOR SHOULDER X-RAY: CPT

## 2017-10-30 PROCEDURE — A9576 INJ PROHANCE MULTIPACK: HCPCS | Performed by: RADIOLOGY

## 2017-10-30 RX ORDER — LIDOCAINE HYDROCHLORIDE 10 MG/ML
10 INJECTION INFILTRATION; PERINEURAL
Status: COMPLETED | OUTPATIENT
Start: 2017-10-30 | End: 2017-10-30

## 2017-10-30 RX ADMIN — LIDOCAINE HYDROCHLORIDE 10 ML: 10 INJECTION, SOLUTION INFILTRATION; PERINEURAL at 10:23

## 2017-10-30 RX ADMIN — IOHEXOL 10 ML: 300 INJECTION, SOLUTION INTRAVENOUS at 10:23

## 2017-10-30 RX ADMIN — GADOTERIDOL 2 ML: 279.3 INJECTION, SOLUTION INTRAVENOUS at 10:23

## 2017-11-16 ENCOUNTER — HOSPITAL ENCOUNTER (OUTPATIENT)
Dept: MRI IMAGING | Age: 32
Discharge: HOME | End: 2017-11-16
Attending: ORTHOPAEDIC SURGERY
Payer: COMMERCIAL

## 2017-11-16 VITALS — WEIGHT: 106 LBS | BODY MASS INDEX: 18.19 KG/M2

## 2017-11-16 DIAGNOSIS — M54.16 LUMBAR RADICULOPATHY: ICD-10-CM

## 2017-11-16 DIAGNOSIS — Z98.890 HISTORY OF MICRODISCECTOMY: ICD-10-CM

## 2017-11-16 PROCEDURE — A9576 INJ PROHANCE MULTIPACK: HCPCS | Performed by: RADIOLOGY

## 2017-11-16 PROCEDURE — 72158 MRI LUMBAR SPINE W/O & W/DYE: CPT

## 2017-11-16 PROCEDURE — 74011250636 HC RX REV CODE- 250/636: Performed by: RADIOLOGY

## 2017-11-16 RX ADMIN — GADOTERIDOL 9 ML: 279.3 INJECTION, SOLUTION INTRAVENOUS at 18:33

## 2021-03-29 NOTE — PROGRESS NOTES
ORTHOPAEDIC SPINE SURGERY    6:24 PM   Spoke w/ Dr. Abbi Fernandes by phone. The pt's CRP and ESR are elevated. Her CBC and metabolic panel are unremarkable. She does not have any hyper-reflexia, clonus or LE weakness on evaluation in the ED. She denies any urinary retention or bowel/bladder dysfunction. She does not have any saddle paresthesias. We will get a new MRI of the Lumbar spine w/ and w/o contrast. The patient will be admitted to the Orthopedic Surgery service as discussed w/ Dr. Hilda Rey. We will also have ID evaluate the patient during admission. Dr. Abbi Fernandes is going to order IV Vancomycin for the patient.      Rocco Brown, [place]  Orthopaedic Surgery  Physician Assistant to Dr. Hilda Rey Cryotherapy Text: The wound bed was treated with cryotherapy after the biopsy was performed.

## 2021-07-22 NOTE — OP NOTES
Initial SW/CM Assessment/Plan of Care Note     Baseline Assessment  29 year old admitted 7/20/2021 as Inpatient with a diagnosis of sepsis/septic shock likely secondary to endocarditis.   Prior to admission patient was living with Parent and residing at House.  Patient does not  have a Power of  for Healthcare.  Patient’s Primary Care Provider is Ray Malcolm MD.     Medical History  Past Medical History:   Diagnosis Date   • Asthma, exercise induced     no meds       Prior to Admission Status  Functional Status  Functional Status Comments: independent    Agency/Support  Type of Services Prior to Hospitalization: None  Support Systems: Parent  Home Devices/Equipment: None  Mobility Assist Devices: Gait belt  Sensory Support Devices: None    Current Status  PT Ambulation Tips:    PT Transfer Tips:     OT Bathing Tips:    OT Dressing Tips:    OT Toileting Tips:    OT Feeding Tips:    SLP Swallow/Feeding Tips:    SLP Comm/Cog Tips:    Current Mental Status:    Stressors:      Insurance  Primary: [facility] [employer]  Secondary: N/A    Barriers to Discharge  Identified Barriers to Discharge/Transition Planning: Assessment/stabilization in progress    Progress Note  Physician referral received on this date for advance directives. Writer reviewed medical record and received update from nursing during OFT's earlier today. Aware that pt had increased agitation and confusion overnight. Pt also had worsening respiratory distress this morning that required intubation. Pt is currently intubated and sedated on the vent. Pt is unable to participate in advance directive discussion at this time given above. SW will follow and assist with potential completion of POA-HC when pt is able to participate in conversation.    Plan  SW/CM - Recommendations for Discharge:     PT - Recommendations for Discharge:      OT - Recommendations for Discharge:      SLP - Recommendations for Discharge:     Anticipate patient will  Isma Suzanne [facility] [place] [address]   [address] [facility], [address]   OP NOTE       Name:  Jacobo Harrington   MR#:  [id number]   :  1985   Account #:  [de-identified]    Surgery Date:  2017   Date of Adm:  2017       PREOPERATIVE DIAGNOSES:   1. Recurrent disk herniation. 2. Low back pain. 3. Prior microdiskectomy. 4. Presumed surgical site infection. POSTOPERATIVE DIAGNOSIS:   1. Recurrent disk herniation. 2. Low back pain. 3. Prior microdiskectomy. 4. Presumed surgical site infection. SURGEON: Nancy Llamas MD.     ASSISTANT: Nii Latif. ANESTHESIA: General.    ESTIMATED BLOOD LOSS: 10 mL. COMPLICATIONS: None. INTRAOPERATIVE FINDINGS: There was approximately 10 mL of   cloudy serous fluid. SPECIMENS: We obtained deep spine wound cultures. INDICATION FOR PROCEDURE: The patient is a very pleasant A 33  year-old female who underwent a right-sided L5-S1 microdiskectomy. She did very well after the surgery. She had no symptoms. She then   developed lower back pain this weekend. It became very severe. She   was admitted. An MRI showed what appeared to be a small recurrent   disk herniation. She did recently return from an overseas trip to   [facility]. During the trip, she had no problems. She was doing very   well until the onset of her lower back pain this past weekend. The new MRI did show what appeared to be a recurrent disk   herniation. It was small. She was having a great deal of pain. We did   obtain blood work including, including an ESR and CRP, both of which   were elevated. We then did an aspiration of subcutaneous surgical   fluid. The Gram stain showed eventual gram-negative rods. Consequently, the decision was made to proceed with operative   intervention. She provided informed consent. DESCRIPTION OF PROCEDURE: The patient was identified in the   preoperative holding area. Her lumbar spine was marked by me.  She   was need post-hospital services. Necessary services are available.    Refer to / Flowsheet for Goals and objective data.      transferred to the operating room where general anesthesia was   given. She was placed prone. All bony prominences were well-padded. I made a skin incision after a surgical timeout. We obtained deep spine   cultures. She was then given perioperative antibiotics after the cultures   were obtained. We copiously irrigated the entire spine with 9 liters of   antibiotic solution using pulse lavage. I then performed a standard   microdiskectomy using a surgical microscope. There was a small right-  sided disk protrusion, which was excised in standard fashion. The   thecal sac and nerve root were decompressed. We had good   hemostasis. The soft tissues were viable. There was no evidence of   necrosis. I placed a deep drain. The wound was closed in multiple   layers. A sterile dressing was applied. The patient was extubated and   transferred to the recovery room in good medical condition. I, Dr. Ilsa Bustamante, performed the above procedure.         MD Angelina Haddad / [email or web address]   D:  03/07/2017   11:53   T:  03/07/2017   15:41   Job #:  [id number]

## 2021-09-13 NOTE — ROUTINE PROCESS
1010: Activase placed into PICC line r/t no blood return. After one hour, flushed and got good blood return. 1430: Bed changed out per patients request, she was sinking in the bed. 1430: Looked at dressing, no further drainage from where it was previously marked    Bedside and Verbal shift change report given to Casper Paniagua RN (oncoming nurse) by Vanda Cabrera RN (offgoing nurse). Report included the following information SBAR, Procedure Summary, Intake/Output and MAR. No

## 2021-12-12 NOTE — PROGRESS NOTES
PICC Placement Note    PRE-PROCEDURE VERIFICATION  Correct Procedure: yes  Correct Site:  yes  Temperature: Temp: 97.4 °F (36.3 °C), Temperature Source: Temp Source: Oral  Recent Labs      03/09/17   0556   BUN  6   CREA  0.66   PLT  137*   INR  1.1   WBC  6.0     Allergies: Review of patient's allergies indicates no known allergies. Education materials for PICC Care given: yes. See Patient Education activity for further details. PICC Booklet placed at bedside: yes    Closed Ended PICC Catheters:  Flush Lumens as Follows:  Intermittent Medication:   Flush before and after each medication with 10 ml NS. Unused Ports:  Flush every 8 hours with 10 ml NS.  TPN Ports:  Flush every 24 hours with 20 ml NS prior to hanging new bag. Blood Draws: Stop infusion, draw off and waste 10 ml of blood. Draw sample with 10cc syringe or greater. DO NOT USE VACUTAINER . Transfer with appropriate device to lab  tubes. Flush with 20 ml NS. Dressing Change:  Every 7 days, and PRN using sterile technique if integrity of dressing is compromised. Initial dressing change for central line 24-48 hours post insertion if gauze is used. Apply new dressing per policy. PROCEDURE DETAIL  Consent was obtained and all questions were answered related to risks and benefits. A double lumen PICC line was inserted, as a sterile procedure using ultrasound and modified Seldinger technique for Home IV Therapy. The following documentation is in addition to the PICC properties in the lines/airways flowsheet :  Lot #: [id number]  Lidocaine 1% administered intradermally :yes  Internal Catheter Total Length: 40 (cm)  Vein Selection for PICC:left basilic  Central Line Bundle followed yes  Complication Related to Insertion:no      X-ray: yes. The x-ray results state the tip location is on the left side and the tip overlies the lower superior vena cava.      Line is okay to use: yes    Emerald Greenwood RN (1) More than 48 hours/None

## 2022-03-19 PROBLEM — T81.40XA POST-OPERATIVE INFECTION: Status: ACTIVE | Noted: 2017-03-21

## 2022-03-19 PROBLEM — M51.26 LUMBAR DISC HERNIATION: Status: ACTIVE | Noted: 2017-03-07

## 2024-03-07 ENCOUNTER — OFFICE VISIT (OUTPATIENT)
Age: 39
End: 2024-03-07
Payer: COMMERCIAL

## 2024-03-07 VITALS
DIASTOLIC BLOOD PRESSURE: 80 MMHG | HEIGHT: 62 IN | HEART RATE: 80 BPM | WEIGHT: 115.6 LBS | BODY MASS INDEX: 21.27 KG/M2 | SYSTOLIC BLOOD PRESSURE: 110 MMHG | OXYGEN SATURATION: 100 %

## 2024-03-07 DIAGNOSIS — E78.5 HYPERLIPIDEMIA, UNSPECIFIED HYPERLIPIDEMIA TYPE: Primary | ICD-10-CM

## 2024-03-07 DIAGNOSIS — R93.1 AGATSTON CORONARY ARTERY CALCIUM SCORE BETWEEN 100 AND 199: ICD-10-CM

## 2024-03-07 DIAGNOSIS — Z76.89 ENCOUNTER TO ESTABLISH CARE: ICD-10-CM

## 2024-03-07 PROCEDURE — 93000 ELECTROCARDIOGRAM COMPLETE: CPT | Performed by: INTERNAL MEDICINE

## 2024-03-07 PROCEDURE — 99204 OFFICE O/P NEW MOD 45 MIN: CPT | Performed by: INTERNAL MEDICINE

## 2024-03-07 RX ORDER — TRETINOIN 1 MG/G
CREAM TOPICAL
COMMUNITY
Start: 2024-01-30

## 2024-03-07 RX ORDER — ASPIRIN 81 MG/1
81 TABLET, COATED ORAL
COMMUNITY
Start: 2024-01-23 | End: 2024-04-22

## 2024-03-07 RX ORDER — ROSUVASTATIN CALCIUM 10 MG/1
TABLET, FILM COATED ORAL
COMMUNITY
Start: 2024-01-26

## 2024-03-07 RX ORDER — MULTIVITAMIN
1 TABLET ORAL DAILY
COMMUNITY

## 2024-03-07 ASSESSMENT — PATIENT HEALTH QUESTIONNAIRE - PHQ9
1. LITTLE INTEREST OR PLEASURE IN DOING THINGS: 0
SUM OF ALL RESPONSES TO PHQ QUESTIONS 1-9: 0
2. FEELING DOWN, DEPRESSED OR HOPELESS: 0
SUM OF ALL RESPONSES TO PHQ9 QUESTIONS 1 & 2: 0
SUM OF ALL RESPONSES TO PHQ QUESTIONS 1-9: 0

## 2024-03-07 NOTE — PROGRESS NOTES
Patient: Rema Olivas  : 1985    Primary Cardiologist: Reina Le MD  EP Cardiologist:  PCP: Joceline Myrick APRN - NP    Today's Date: 3/7/2024      ASSESSMENT AND PLAN:     Assessment and Plan:  HLD  Rosuva 10  Repeat lipid panel with  Lp (a) in 4-6 months    2. , all in LAD, 90th percentile risk given young age, female gender  Rosuva 10  ASA 81 3x week      ++++ labs reviewed addendum:  TSH <0.01, LFTS wnl, normal BMP, do not see lipids included.      Follow up 4-6 months with repeat lipid panel with  Lp (a).      ICD-10-CM    1. Hyperlipidemia, unspecified hyperlipidemia type  E78.5 Lipid Panel     Lipoprotein A (LPA)      2. Encounter to establish care  Z76.89 EKG 12 Lead          HISTORY OF PRESENT ILLNESS:     History of Present Illness:  Rema Olivas is a 39 y.o. female referred for elevated CCS.     HLD had been simvastatin for years - markedly elevated cholesterol - young adult.    Had kids, breast fed, off statin.      Recent PCP visit - advised to get CCS.  [place] imaging.  Total calcium score 120, 90th percentile rank.   LMCA 0    RCA 0  LCX 0  PDA 0    Mom HLD on meds. No events.  Dad healthy siblings ok.    Seeing endocrinology - TSH low.  Did not treat it.      Feels great.  Active no symptoms.    EKG NSR iRBBB.    PCP notes Joceline Myrick NP VPI requested    Denies chest pain, edema, syncope, shortness of breath at rest, dyspnea on exertion, PND or orthopnea.  Has no tachycardia, palpitations or sense of arrhythmia.     PAST MEDICAL HISTORY:     History reviewed. No pertinent past medical history.     History reviewed. No pertinent surgical history.    CURRENT MEDICATIONS:    .  Current Outpatient Medications   Medication Sig Dispense Refill    CRESTOR 10 MG tablet       ASPIRIN LOW DOSE 81 MG EC tablet Take 1 tablet by mouth      Multiple Vitamin (DAILY VITES) TABS Take 1 tablet by mouth daily      tretinoin (RETIN-A) 0.1 % cream Apply to whole

## 2024-03-07 NOTE — PROGRESS NOTES
PCP records received after OV completed.  Records reviewed by Dr. Le and sent to scanning. No lipid in record.

## 2024-03-25 NOTE — ROUTINE PROCESS
PC with pt  Discussed with Dr Box  Non-ob ultrasound ordered  Pt informed that PSR will reach out to pt to get her scheduled for ultrasound with one of our GYN providers  RN did inform pt to go to ER with any bleeding or pain  Pt verbalized an understanding     TRANSFER - IN REPORT:    Verbal report received from Sami Lawler [facility] (name) on Guero Amin  being received from [phone] (unit) for routine progression of care      Report consisted of patients Situation, Background, Assessment and   Recommendations(SBAR). Information from the following report(s) SBAR, ED Summary, Procedure Summary, MAR and Recent Results was reviewed with the receiving nurse. Opportunity for questions and clarification was provided. Assessment completed upon patients arrival to unit and care assumed.

## 2024-08-31 LAB
CHOLEST SERPL-MCNC: 214 MG/DL (ref 100–199)
HDLC SERPL-MCNC: 98 MG/DL
LDLC SERPL CALC-MCNC: 105 MG/DL (ref 0–99)
TRIGL SERPL-MCNC: 62 MG/DL (ref 0–149)
VLDLC SERPL CALC-MCNC: 11 MG/DL (ref 5–40)

## 2024-09-04 LAB — LPA SERPL-SCNC: 65.7 NMOL/L

## 2024-09-05 ENCOUNTER — OFFICE VISIT (OUTPATIENT)
Age: 39
End: 2024-09-05
Payer: COMMERCIAL

## 2024-09-05 VITALS
OXYGEN SATURATION: 98 % | BODY MASS INDEX: 22.26 KG/M2 | SYSTOLIC BLOOD PRESSURE: 90 MMHG | HEART RATE: 83 BPM | WEIGHT: 121 LBS | DIASTOLIC BLOOD PRESSURE: 60 MMHG | HEIGHT: 62 IN

## 2024-09-05 DIAGNOSIS — E78.5 HYPERLIPIDEMIA, UNSPECIFIED HYPERLIPIDEMIA TYPE: Primary | ICD-10-CM

## 2024-09-05 DIAGNOSIS — R93.1 AGATSTON CORONARY ARTERY CALCIUM SCORE BETWEEN 100 AND 199: ICD-10-CM

## 2024-09-05 DIAGNOSIS — E05.90 HYPERTHYROIDISM: ICD-10-CM

## 2024-09-05 LAB — IMP & REVIEW OF LAB RESULTS: NORMAL

## 2024-09-05 PROCEDURE — 99214 OFFICE O/P EST MOD 30 MIN: CPT | Performed by: INTERNAL MEDICINE

## 2024-09-05 RX ORDER — METHIMAZOLE 5 MG/1
5 TABLET ORAL DAILY
COMMUNITY

## 2024-09-05 NOTE — PROGRESS NOTES
Patient: Rema Olivas  : 1985    Primary Cardiologist: Reina Le MD  EP Cardiologist:  PCP: Joceline Myrick APRN - NP    Today's Date: 2024      ASSESSMENT AND PLAN:     Assessment and Plan:  HLD  Rosuva 10  Repeat lipid panel with  Lp (a) in 4-6 months  Lab Results   Component Value Date     (H) 2024     Lp(a) wnl @ 66    2. , all in LAD, 90th percentile risk given young age, female gender  Rosuva 10    3.  Hyperthyroidism  Methimazole      Follow up one year.       ICD-10-CM    1. Hyperlipidemia, unspecified hyperlipidemia type  E78.5       2. Agatston coronary artery calcium score between 100 and 199  R93.1           HISTORY OF PRESENT ILLNESS:     History of Present Illness:  Rema Olivas is a 39 y.o. female referred for elevated CCS.     HLD had been simvastatin for years - markedly elevated cholesterol - young adult.    Had kids, breast fed, off statin.      Recent PCP visit - advised to get CCS.  [place] imaging.  Total calcium score 120, 90th percentile rank.   LMCA 0    RCA 0  LCX 0  PDA 0    Mom HLD on meds. No events.  Dad healthy siblings ok.    Seeing endocrinology - TSH low.  Did not treat it.      Feels great.  Active no symptoms.    EKG NSR iRBBB.    PCP notes Joceline Myrick NP VPI requested    Denies chest pain, edema, syncope, shortness of breath at rest, dyspnea on exertion, PND or orthopnea.  Has no tachycardia, palpitations or sense of arrhythmia.     PAST MEDICAL HISTORY:     No past medical history on file.     No past surgical history on file.    CURRENT MEDICATIONS:    .  Current Outpatient Medications   Medication Sig Dispense Refill    methIMAzole (TAPAZOLE) 5 MG tablet Take 1 tablet by mouth daily HALF TAB ONCE DAILY      CRESTOR 10 MG tablet       Multiple Vitamin (DAILY VITES) TABS Take 1 tablet by mouth daily      tretinoin (RETIN-A) 0.1 % cream Apply to whole face at bedtime.  Use Vaseline over it if dry.  Pharmacy,

## 2024-09-05 NOTE — PROGRESS NOTES
Chief Complaint   Patient presents with    Other     HLD, HIGH CALCIUM SCORE     Vitals:    09/05/24 0926   BP: 90/60   Site: Left Upper Arm   Position: Sitting   Cuff Size: Medium Adult   Pulse: 83   SpO2: 98%   Weight: 54.9 kg (121 lb)   Height: 1.575 m (5' 2\")      BP 90/60 (Site: Left Upper Arm, Position: Sitting, Cuff Size: Medium Adult)   Pulse 83   Ht 1.575 m (5' 2\")   Wt 54.9 kg (121 lb)   SpO2 98%   BMI 22.13 kg/m²

## 2025-09-02 ENCOUNTER — TELEPHONE (OUTPATIENT)
Age: 40
End: 2025-09-02

## 2025-09-02 DIAGNOSIS — E78.5 HYPERLIPIDEMIA: Primary | ICD-10-CM

## 2025-09-05 ENCOUNTER — OFFICE VISIT (OUTPATIENT)
Age: 40
End: 2025-09-05
Payer: COMMERCIAL

## 2025-09-05 VITALS
SYSTOLIC BLOOD PRESSURE: 118 MMHG | RESPIRATION RATE: 18 BRPM | DIASTOLIC BLOOD PRESSURE: 80 MMHG | HEART RATE: 64 BPM | OXYGEN SATURATION: 99 % | HEIGHT: 62 IN | WEIGHT: 127 LBS | BODY MASS INDEX: 23.37 KG/M2

## 2025-09-05 DIAGNOSIS — Z71.89 CARDIAC RISK COUNSELING: ICD-10-CM

## 2025-09-05 DIAGNOSIS — R93.1 AGATSTON CORONARY ARTERY CALCIUM SCORE BETWEEN 100 AND 199: ICD-10-CM

## 2025-09-05 DIAGNOSIS — E05.90 HYPERTHYROIDISM: ICD-10-CM

## 2025-09-05 DIAGNOSIS — E78.5 HYPERLIPIDEMIA, UNSPECIFIED HYPERLIPIDEMIA TYPE: Primary | ICD-10-CM

## 2025-09-05 PROCEDURE — 93000 ELECTROCARDIOGRAM COMPLETE: CPT | Performed by: INTERNAL MEDICINE

## 2025-09-05 PROCEDURE — 99214 OFFICE O/P EST MOD 30 MIN: CPT | Performed by: INTERNAL MEDICINE

## 2025-09-05 RX ORDER — SIMVASTATIN 10 MG
10 TABLET ORAL NIGHTLY
Qty: 90 TABLET | Refills: 3 | Status: SHIPPED | OUTPATIENT
Start: 2025-09-05

## (undated) DEVICE — BIPOLAR FORCEPS CORD,BANANA LEADS: Brand: VALLEYLAB

## (undated) DEVICE — TRAY CATH OD16FR SIL URIN M STATLOK STBL DEV SURSTP

## (undated) DEVICE — 1010 S-DRAPE TOWEL DRAPE 10/BX: Brand: STERI-DRAPE™

## (undated) DEVICE — SUTURE VCRL SZ 1 L36IN ABSRB UD L36MM CT-1 1/2 CIR J947H

## (undated) DEVICE — ELECTRODE BLDE L4IN NONINSULATED EDGE

## (undated) DEVICE — 3000CC GUARDIAN II: Brand: GUARDIAN

## (undated) DEVICE — SWAB CULT LIQ STUART AGR AERB MOD IN BRK SGL RAYON TIP PLAS 220099] BECTON DICKINSON MICRO]

## (undated) DEVICE — SOLUTION IRRIG 3000ML 0.9% SOD CHL FLX CONT 0797208] ICU MEDICAL INC]

## (undated) DEVICE — MAGNETIC DRAPE: Brand: DEVON

## (undated) DEVICE — STAPLER SKIN 35CT WD STRL DISP -- MULTIFIRE PREMIUM

## (undated) DEVICE — TIP CLEANER: Brand: VALLEYLAB

## (undated) DEVICE — STERILE POLYISOPRENE POWDER-FREE SURGICAL GLOVES: Brand: PROTEXIS

## (undated) DEVICE — ACCY PA100-A LEGEND LUB/DIFFUSER 4 PACK: Brand: MIDAS REX®

## (undated) DEVICE — CODMAN® SURGICAL PATTIES 3/4" X 3/4" (1.91CM X 1.91CM): Brand: CODMAN®

## (undated) DEVICE — (D)SYR 10ML 1/5ML GRAD NSAF -- PKGING CHANGE USE ITEM 338027

## (undated) DEVICE — DRAPE,UTILTY,TAPE,15X26, 4EA/PK: Brand: MEDLINE

## (undated) DEVICE — Z CONVERTED USE 2271043 CONTAINER SPEC COLL 4OZ SCR ON LID PEEL PCH

## (undated) DEVICE — SYRINGE MED 20ML STD CLR PLAS LUERLOCK TIP N CTRL DISP

## (undated) DEVICE — SYR 3ML LL TIP 1/10ML GRAD --

## (undated) DEVICE — STERILE POLYISOPRENE POWDER-FREE SURGICAL GLOVES WITH EMOLLIENT COATING: Brand: PROTEXIS

## (undated) DEVICE — INTENDED FOR TISSUE SEPARATION, AND OTHER PROCEDURES THAT REQUIRE A SHARP SURGICAL BLADE TO PUNCTURE OR CUT.: Brand: BARD-PARKER ® CARBON RIB-BACK BLADES

## (undated) DEVICE — INFECTION CONTROL KIT SYS

## (undated) DEVICE — WILSON FRAME STYLE POSITIONING KITS - 	FRAME PAD SLEEVES (SET OF 2) , DRAPE PROTECTOR, BAR PROTECTOR 7" COMFORT FOAM HEADREST, LAMINECTOMY ARM CRADLES: Brand: SOULE MEDICAL

## (undated) DEVICE — Z DISCONTINUEDSOLUTION PREP 2OZ 10% POVIDONE IOD SCR CAP BTL

## (undated) DEVICE — KENDALL SCD EXPRESS SLEEVES, KNEE LENGTH, MEDIUM: Brand: KENDALL SCD

## (undated) DEVICE — (D)PREP SKN CHLRAPRP APPL 26ML -- CONVERT TO ITEM 371833

## (undated) DEVICE — NEEDLE HYPO 22GA L1.5IN BLK S STL HUB POLYPR SHLD REG BVL

## (undated) DEVICE — DRAPE,REIN 53X77,STERILE: Brand: MEDLINE

## (undated) DEVICE — WATERPROOF, BACTERIA PROOF DRESSING WITH ABSORBENT SEE THROUGH PAD: Brand: OPSITE POST-OP VISIBLE 25X10CM CTN 20

## (undated) DEVICE — DRAPE XR C ARM 41X74IN LF --

## (undated) DEVICE — DRAIN KT WND 10FR RND 400ML --

## (undated) DEVICE — YANKAUER OPEN TIP, NO VENT: Brand: ARGYLE

## (undated) DEVICE — SUTURE VCRL SZ 0 L27IN ABSRB UD L26MM CT-2 1/2 CIR J270H

## (undated) DEVICE — COVER,TABLE,60X90,STERILE: Brand: MEDLINE

## (undated) DEVICE — DRSG PATCH ANTIMIC 1INX4.0MM -- CONVERT TO ITEM 356053

## (undated) DEVICE — NDL SPNE QNCKE 18GX3.5IN LF --

## (undated) DEVICE — NEEDLE HYPO 18GA L1.5IN PNK S STL HUB POLYPR SHLD REG BVL

## (undated) DEVICE — LAMINECTOMY RICHMOND-LF: Brand: MEDLINE INDUSTRIES, INC.

## (undated) DEVICE — 3M™ TEGADERM™ TRANSPARENT FILM DRESSING FRAME STYLE, 1626W, 4 IN X 4-3/4 IN (10 CM X 12 CM), 50/CT 4CT/CASE: Brand: 3M™ TEGADERM™

## (undated) DEVICE — MEDI-VAC NON-CONDUCTIVE SUCTION TUBING: Brand: CARDINAL HEALTH

## (undated) DEVICE — SUTURE VCRL SZ 2-0 L27IN ABSRB UD L26MM CT-2 1/2 CIR J269H

## (undated) DEVICE — CATHETER IV 14GA L1.25IN TEF STR HUB INTROCAN SFTY

## (undated) DEVICE — COVER LT HNDL BLU PLAS

## (undated) DEVICE — KIT INFECTION CTRL ST FRAN --

## (undated) DEVICE — SUTURE MCRYL SZ 3-0 L27IN ABSRB UD L24MM PS-1 3/8 CIR PRIM Y936H

## (undated) DEVICE — STOPCOCK IV 3W --

## (undated) DEVICE — PLUS HANDPIECE WITH SUCTION TUBING AND TRAUMA TIP WITH SMALL SOFT CONE: Brand: SURGILAV

## (undated) DEVICE — TOOL 14MH30 LEGEND 14CM 3MM: Brand: MIDAS REX ™

## (undated) DEVICE — DEVON™ KNEE AND BODY STRAP 60" X 3" (1.5 M X 7.6 CM): Brand: DEVON

## (undated) DEVICE — DRSG AQUACEL SURG 3.5 X 10IN -- CONVERT TO ITEM 370183

## (undated) DEVICE — CULTURETTE SGL EVAC TUBE PALL -- 100/CA

## (undated) DEVICE — SYR 5ML 1/5 GRAD LL NSAF LF --

## (undated) DEVICE — Device

## (undated) DEVICE — TOWEL SURG W17XL27IN STD BLU COT NONFENESTRATED PREWASHED

## (undated) DEVICE — SUTURE VCRL SZ 0 L36IN ABSRB UD L40MM CT 1/2 CIR TAPERPOINT J958H

## (undated) DEVICE — 4-PORT MANIFOLD: Brand: NEPTUNE 2

## (undated) DEVICE — 3M™ TEGADERM™ TRANSPARENT FILM DRESSING FRAME STYLE, 1626, 4 IN X 4-3/4 IN (10 CM X 12 CM), 50/CT 4CT/CASE: Brand: 3M™ TEGADERM™